# Patient Record
Sex: MALE | Race: ASIAN | NOT HISPANIC OR LATINO | URBAN - METROPOLITAN AREA
[De-identification: names, ages, dates, MRNs, and addresses within clinical notes are randomized per-mention and may not be internally consistent; named-entity substitution may affect disease eponyms.]

---

## 2018-11-08 VITALS — WEIGHT: 229.94 LBS | HEIGHT: 74 IN

## 2018-11-08 NOTE — H&P ADULT - ASSESSMENT
52 y/o French speaking M, current smoker, FMHx CAD/MI and Sudden Cardiac Death (Brother in 50s) w/ PMHX HTN, Hyperlipidemia, DM type 2 who in light of pt's risk factors, progressive CCS Angina Class 4 Sx, is now referred for cardiac catheterization w/ possible intervention.   Note: pt reports feeling lethargic after taking his current BP medications, however reports compliance w/ them. Additionally, pt states he took his Metformin this AM.    ASA: II   Mallampati: III    Precath/consented  Dr. Powell made aware that pt took his Metformin and as per Dr. Powell OK to proceed with cath.  IVF NS @ 100cc/hr  Loaded with ASA 325mg PO x 1 and Plavix 600mg PO x 1.  Risks & benefits of procedure and alternative therapy have been explained to the patient including but not limited to: allergic reaction, bleeding w/possible need for blood transfusion, infection, renal and vascular compromise, limb damage, arrhythmia, stroke, vessel dissection/perforation, Myocardial infarction, emergent CABG. Informed consent obtained and in chart. 52 y/o Arabic speaking M, current smoker, FMHx CAD/MI and Sudden Cardiac Death (Brother in 50s) w/ PMHX HTN, Hyperlipidemia, DM type 2 who in light of pt's risk factors, progressive CCS Angina Class 4 Sx, is now referred for cardiac catheterization w/ possible intervention.   Note: pt reports feeling lethargic after taking his current BP medications, however reports compliance w/ them. Additionally, pt states he took his Metformin this AM.    ASA: II   Mallampati: III    Precath/consented  Dr. Powell made aware that pt took his Metformin and as per Dr. Powell OK to proceed with cath.  IVF NS @ 100cc/hr  Loaded with ASA 325mg PO x 1 and Plavix 600mg PO x 1.]  K 3.7 repleted with KCl 40mEq PO x 1  Risks & benefits of procedure and alternative therapy have been explained to the patient including but not limited to: allergic reaction, bleeding w/possible need for blood transfusion, infection, renal and vascular compromise, limb damage, arrhythmia, stroke, vessel dissection/perforation, Myocardial infarction, emergent CABG. Informed consent obtained and in chart.

## 2018-11-08 NOTE — H&P ADULT - NSHPLABSRESULTS_GEN_ALL_CORE
13.9   5.8   )-----------( 188      ( 09 Nov 2018 13:02 )             41.0   PT/INR - ( 09 Nov 2018 13:02 )   PT: 10.6 sec;   INR: 0.94          PTT - ( 09 Nov 2018 13:02 )  PTT:34.9 sec 13.9   5.8   )-----------( 188      ( 09 Nov 2018 13:02 )             41.0   PT/INR - ( 09 Nov 2018 13:02 )   PT: 10.6 sec;   INR: 0.94          PTT - ( 09 Nov 2018 13:02 )  PTT:34.9 sec  11-09    139  |  100  |  13  ----------------------------<  194<H>  3.7   |  26  |  0.73    Ca    9.7      09 Nov 2018 13:01    TPro  7.6  /  Alb  4.6  /  TBili  0.4  /  DBili  x   /  AST  15  /  ALT  31  /  AlkPhos  66  11-09    EKG: SR, 1st degree AV block, nonspecific IVCD, ST depression in III

## 2018-11-08 NOTE — H&P ADULT - FAMILY HISTORY
Sibling  Still living? Unknown  Family history of sudden cardiac death in brother, Age at diagnosis: 51-60

## 2018-11-08 NOTE — H&P ADULT - HISTORY OF PRESENT ILLNESS
SKELETON    52 yo Tamazight speaking male, PMHx HTN, hyperlipidemia, DM type 2 presents to cardiologist endorsing nonradiating left sided chest pain with shortness of breath with exertion, worsening over the last month. Pt also endorses intermittent palpitations. Denies nausea, weakness. Echocardiogram 11/5/18 showed mild concentric LVH with normal global wall motion. SKELETON    54 yo Yoruba speaking male, current smoker, FHx SCD 2/2 MI (brother in 50s) PMHx HTN, hyperlipidemia, DM type 2 presents to cardiologist endorsing nonradiating left sided chest pain with shortness of breath with exertion, worsening over the last month. Pt also endorses intermittent palpitations. Denies nausea, weakness. Echocardiogram 11/5/18 showed mild concentric LVH with normal global wall motion. EF 68%. Mild AR. *Confirm Medications on Arrival    History obtained from pt via [employer]      54 y/o Sami speaking M, current smoker, FMHx CAD/MI and Sudden Cardiac Death (Brother in 50s) w/ PMHX HTN, Hyperlipidemia, DM type 2 who presented to his cardiologist endorsing progressive intermittent "stabbing" L sided C/P radiating to the back at rest (CCS Angina Class 4 Sx) associated with SOB and fatigue worsening over the last month. Pt also endorses intermittent palpitations. Denies dizziness, nausea, syncope, LE edema. Echocardiogram 11/5/18 showed mild concentric LVH with normal global wall motion. EF 68%. Mild AR.  Due to pts risk factors, progressive CCS Angina Class 4 Sx, pt is referred for cardiac catheterization w/ possible intervention.   Note: pt reports feeling lethargic after taking his current BP medications, however reports compliance w/ them. 54 y/o Mohawk speaking M, current smoker, FMHx CAD/MI and Sudden Cardiac Death (Brother in 50s) w/ PMHX HTN, Hyperlipidemia, DM type 2 who presented to his cardiologist endorsing progressive intermittent "stabbing" L sided C/P radiating to the back at rest (CCS Angina Class 4 Sx) associated with SOB and fatigue worsening over the last month. Pt also endorses intermittent palpitations. Denies dizziness, nausea, syncope, LE edema. Echocardiogram 11/5/18 showed mild concentric LVH with normal global wall motion. EF 68%. Mild AR.  Due to pts risk factors, progressive CCS Angina Class 4 Sx, pt is referred for cardiac catheterization w/ possible intervention.   Note: pt reports feeling lethargic after taking his current BP medications, however reports compliance w/ them. Additionally, pt states he took his Metformin this AM.

## 2018-11-09 ENCOUNTER — OUTPATIENT (OUTPATIENT)
Dept: OUTPATIENT SERVICES | Facility: HOSPITAL | Age: 53
LOS: 1 days | Discharge: NURSING, REHAB OR LONG-TERM CARE | End: 2018-11-09
Payer: MEDICAID

## 2018-11-09 LAB
ALBUMIN SERPL ELPH-MCNC: 4.6 G/DL — SIGNIFICANT CHANGE UP (ref 3.3–5)
ALP SERPL-CCNC: 66 U/L — SIGNIFICANT CHANGE UP (ref 40–120)
ALT FLD-CCNC: 31 U/L — SIGNIFICANT CHANGE UP (ref 10–45)
ANION GAP SERPL CALC-SCNC: 13 MMOL/L — SIGNIFICANT CHANGE UP (ref 5–17)
ANION GAP SERPL CALC-SCNC: 14 MMOL/L — SIGNIFICANT CHANGE UP (ref 5–17)
APTT BLD: 34.9 SEC — SIGNIFICANT CHANGE UP (ref 27.5–36.3)
AST SERPL-CCNC: 15 U/L — SIGNIFICANT CHANGE UP (ref 10–40)
BASOPHILS NFR BLD AUTO: 0.3 % — SIGNIFICANT CHANGE UP (ref 0–2)
BILIRUB SERPL-MCNC: 0.4 MG/DL — SIGNIFICANT CHANGE UP (ref 0.2–1.2)
BUN SERPL-MCNC: 11 MG/DL — SIGNIFICANT CHANGE UP (ref 7–23)
BUN SERPL-MCNC: 13 MG/DL — SIGNIFICANT CHANGE UP (ref 7–23)
CALCIUM SERPL-MCNC: 9.2 MG/DL — SIGNIFICANT CHANGE UP (ref 8.4–10.5)
CALCIUM SERPL-MCNC: 9.7 MG/DL — SIGNIFICANT CHANGE UP (ref 8.4–10.5)
CHLORIDE SERPL-SCNC: 100 MMOL/L — SIGNIFICANT CHANGE UP (ref 96–108)
CHLORIDE SERPL-SCNC: 102 MMOL/L — SIGNIFICANT CHANGE UP (ref 96–108)
CHOLEST SERPL-MCNC: 164 MG/DL — SIGNIFICANT CHANGE UP (ref 10–199)
CK MB CFR SERPL CALC: 1.6 NG/ML — SIGNIFICANT CHANGE UP (ref 0–6.7)
CK SERPL-CCNC: 89 U/L — SIGNIFICANT CHANGE UP (ref 30–200)
CO2 SERPL-SCNC: 24 MMOL/L — SIGNIFICANT CHANGE UP (ref 22–31)
CO2 SERPL-SCNC: 26 MMOL/L — SIGNIFICANT CHANGE UP (ref 22–31)
CREAT SERPL-MCNC: 0.72 MG/DL — SIGNIFICANT CHANGE UP (ref 0.5–1.3)
CREAT SERPL-MCNC: 0.73 MG/DL — SIGNIFICANT CHANGE UP (ref 0.5–1.3)
EOSINOPHIL NFR BLD AUTO: 3.8 % — SIGNIFICANT CHANGE UP (ref 0–6)
GLUCOSE SERPL-MCNC: 105 MG/DL — HIGH (ref 70–99)
GLUCOSE SERPL-MCNC: 194 MG/DL — HIGH (ref 70–99)
HCT VFR BLD CALC: 37.7 % — LOW (ref 39–50)
HCT VFR BLD CALC: 41 % — SIGNIFICANT CHANGE UP (ref 39–50)
HDLC SERPL-MCNC: 59 MG/DL — SIGNIFICANT CHANGE UP
HGB BLD-MCNC: 12.9 G/DL — LOW (ref 13–17)
HGB BLD-MCNC: 13.9 G/DL — SIGNIFICANT CHANGE UP (ref 13–17)
INR BLD: 0.94 — SIGNIFICANT CHANGE UP (ref 0.88–1.16)
LIPID PNL WITH DIRECT LDL SERPL: 81 MG/DL — SIGNIFICANT CHANGE UP
LYMPHOCYTES # BLD AUTO: 32.2 % — SIGNIFICANT CHANGE UP (ref 13–44)
MAGNESIUM SERPL-MCNC: 2.1 MG/DL — SIGNIFICANT CHANGE UP (ref 1.6–2.6)
MCHC RBC-ENTMCNC: 31 PG — SIGNIFICANT CHANGE UP (ref 27–34)
MCHC RBC-ENTMCNC: 31.2 PG — SIGNIFICANT CHANGE UP (ref 27–34)
MCHC RBC-ENTMCNC: 33.9 G/DL — SIGNIFICANT CHANGE UP (ref 32–36)
MCHC RBC-ENTMCNC: 34.2 G/DL — SIGNIFICANT CHANGE UP (ref 32–36)
MCV RBC AUTO: 91.1 FL — SIGNIFICANT CHANGE UP (ref 80–100)
MCV RBC AUTO: 91.5 FL — SIGNIFICANT CHANGE UP (ref 80–100)
MONOCYTES NFR BLD AUTO: 5.9 % — SIGNIFICANT CHANGE UP (ref 2–14)
NEUTROPHILS NFR BLD AUTO: 57.8 % — SIGNIFICANT CHANGE UP (ref 43–77)
PLATELET # BLD AUTO: 170 K/UL — SIGNIFICANT CHANGE UP (ref 150–400)
PLATELET # BLD AUTO: 188 K/UL — SIGNIFICANT CHANGE UP (ref 150–400)
POTASSIUM SERPL-MCNC: 3.7 MMOL/L — SIGNIFICANT CHANGE UP (ref 3.5–5.3)
POTASSIUM SERPL-MCNC: 3.8 MMOL/L — SIGNIFICANT CHANGE UP (ref 3.5–5.3)
POTASSIUM SERPL-SCNC: 3.7 MMOL/L — SIGNIFICANT CHANGE UP (ref 3.5–5.3)
POTASSIUM SERPL-SCNC: 3.8 MMOL/L — SIGNIFICANT CHANGE UP (ref 3.5–5.3)
PROT SERPL-MCNC: 7.6 G/DL — SIGNIFICANT CHANGE UP (ref 6–8.3)
PROTHROM AB SERPL-ACNC: 10.6 SEC — SIGNIFICANT CHANGE UP (ref 10–12.9)
RBC # BLD: 4.14 M/UL — LOW (ref 4.2–5.8)
RBC # BLD: 4.48 M/UL — SIGNIFICANT CHANGE UP (ref 4.2–5.8)
RBC # FLD: 12.4 % — SIGNIFICANT CHANGE UP (ref 10.3–16.9)
RBC # FLD: 12.5 % — SIGNIFICANT CHANGE UP (ref 10.3–16.9)
SODIUM SERPL-SCNC: 139 MMOL/L — SIGNIFICANT CHANGE UP (ref 135–145)
SODIUM SERPL-SCNC: 140 MMOL/L — SIGNIFICANT CHANGE UP (ref 135–145)
TOTAL CHOLESTEROL/HDL RATIO MEASUREMENT: 2.8 RATIO — LOW (ref 3.4–9.6)
TRIGL SERPL-MCNC: 121 MG/DL — SIGNIFICANT CHANGE UP (ref 10–149)
WBC # BLD: 5.4 K/UL — SIGNIFICANT CHANGE UP (ref 3.8–10.5)
WBC # BLD: 5.8 K/UL — SIGNIFICANT CHANGE UP (ref 3.8–10.5)
WBC # FLD AUTO: 5.4 K/UL — SIGNIFICANT CHANGE UP (ref 3.8–10.5)
WBC # FLD AUTO: 5.8 K/UL — SIGNIFICANT CHANGE UP (ref 3.8–10.5)

## 2018-11-09 PROCEDURE — 92920 PRQ TRLUML C ANGIOP 1ART&/BR: CPT | Mod: LD

## 2018-11-09 PROCEDURE — 93458 L HRT ARTERY/VENTRICLE ANGIO: CPT | Mod: 26,XU

## 2018-11-09 PROCEDURE — C1769: CPT

## 2018-11-09 PROCEDURE — 80048 BASIC METABOLIC PNL TOTAL CA: CPT

## 2018-11-09 PROCEDURE — 99235 HOSP IP/OBS SAME DATE MOD 70: CPT

## 2018-11-09 PROCEDURE — 82962 GLUCOSE BLOOD TEST: CPT

## 2018-11-09 PROCEDURE — 85025 COMPLETE CBC W/AUTO DIFF WBC: CPT

## 2018-11-09 PROCEDURE — C1887: CPT

## 2018-11-09 PROCEDURE — 85730 THROMBOPLASTIN TIME PARTIAL: CPT

## 2018-11-09 PROCEDURE — 93458 L HRT ARTERY/VENTRICLE ANGIO: CPT

## 2018-11-09 PROCEDURE — 85027 COMPLETE CBC AUTOMATED: CPT

## 2018-11-09 PROCEDURE — 93571 IV DOP VEL&/PRESS C FLO 1ST: CPT | Mod: LD

## 2018-11-09 PROCEDURE — 85610 PROTHROMBIN TIME: CPT

## 2018-11-09 PROCEDURE — 80053 COMPREHEN METABOLIC PANEL: CPT

## 2018-11-09 PROCEDURE — C1894: CPT

## 2018-11-09 PROCEDURE — C1889: CPT

## 2018-11-09 PROCEDURE — 82553 CREATINE MB FRACTION: CPT

## 2018-11-09 PROCEDURE — C1725: CPT

## 2018-11-09 PROCEDURE — 83735 ASSAY OF MAGNESIUM: CPT

## 2018-11-09 PROCEDURE — 80061 LIPID PANEL: CPT

## 2018-11-09 PROCEDURE — 82550 ASSAY OF CK (CPK): CPT

## 2018-11-09 PROCEDURE — 93571 IV DOP VEL&/PRESS C FLO 1ST: CPT | Mod: 26

## 2018-11-09 RX ORDER — CLOPIDOGREL BISULFATE 75 MG/1
600 TABLET, FILM COATED ORAL ONCE
Qty: 0 | Refills: 0 | Status: COMPLETED | OUTPATIENT
Start: 2018-11-09 | End: 2018-11-09

## 2018-11-09 RX ORDER — ASPIRIN/CALCIUM CARB/MAGNESIUM 324 MG
325 TABLET ORAL ONCE
Qty: 0 | Refills: 0 | Status: COMPLETED | OUTPATIENT
Start: 2018-11-09 | End: 2018-11-09

## 2018-11-09 RX ORDER — CHLORHEXIDINE GLUCONATE 213 G/1000ML
1 SOLUTION TOPICAL ONCE
Qty: 0 | Refills: 0 | Status: DISCONTINUED | OUTPATIENT
Start: 2018-11-09 | End: 2018-11-09

## 2018-11-09 RX ORDER — POTASSIUM CHLORIDE 20 MEQ
40 PACKET (EA) ORAL ONCE
Qty: 0 | Refills: 0 | Status: COMPLETED | OUTPATIENT
Start: 2018-11-09 | End: 2018-11-09

## 2018-11-09 RX ORDER — SODIUM CHLORIDE 9 MG/ML
500 INJECTION INTRAMUSCULAR; INTRAVENOUS; SUBCUTANEOUS
Qty: 0 | Refills: 0 | Status: DISCONTINUED | OUTPATIENT
Start: 2018-11-09 | End: 2018-11-09

## 2018-11-09 RX ORDER — CLOPIDOGREL BISULFATE 75 MG/1
1 TABLET, FILM COATED ORAL
Qty: 30 | Refills: 11
Start: 2018-11-09 | End: 2019-11-03

## 2018-11-09 RX ORDER — ASPIRIN/CALCIUM CARB/MAGNESIUM 324 MG
1 TABLET ORAL
Qty: 30 | Refills: 11
Start: 2018-11-09 | End: 2019-11-03

## 2018-11-09 RX ADMIN — Medication 325 MILLIGRAM(S): at 13:30

## 2018-11-09 RX ADMIN — CLOPIDOGREL BISULFATE 600 MILLIGRAM(S): 75 TABLET, FILM COATED ORAL at 13:30

## 2018-11-09 RX ADMIN — Medication 40 MILLIEQUIVALENT(S): at 16:07

## 2018-11-09 RX ADMIN — SODIUM CHLORIDE 100 MILLILITER(S): 9 INJECTION INTRAMUSCULAR; INTRAVENOUS; SUBCUTANEOUS at 13:30

## 2018-11-09 RX ADMIN — SODIUM CHLORIDE 75 MILLILITER(S): 9 INJECTION INTRAMUSCULAR; INTRAVENOUS; SUBCUTANEOUS at 16:01

## 2018-11-09 NOTE — PROGRESS NOTE ADULT - SUBJECTIVE AND OBJECTIVE BOX
Interventional Cardiology PA SDA Discharge Note    Patient without complaints. Ambulated and voided without difficulties    Afebrile, VSS    Ext:    		  		Right    Radial :  no  hematoma,  no   bleeding, dressing; C/D/I      Pulses:    intact RAD to baseline     A/P:      54 y/o Croatian speaking M, current smoker, FMHx CAD/MI and Sudden Cardiac Death (Brother in 50s) w/ PMHX HTN, Hyperlipidemia, DM type 2 who presented to his cardiologist c/o progressive CCS Angina Class 4 Sx, pt is referred for cardiac catheterization w/ possible intervention.  Patient s/p cardiac catheterization receiving PTCA to D1 (90%), with residual mLAD 50% (FFR 0.83), EF 60%, EDP normal.  Patient deemed stable for discharge home same day post intervention as per Dr. Powell.  He will follow up with Dr. Lema.  New prescription for ASA/Plavix has been E prescribed to pharmacy of choice.       1.	Stable for discharge as per attending  __Sherry_______ after bed rest, pt voids, groin/wrist stable and 30 minutes of ambulation.  2.	Follow-up with PMD/Cardiologist ____Torey_______ in 1-2 weeks  3.	Discharged forms signed and copies in chart
SDA DISCHARGE INSTRUCTIONS    You underwent a coronary/Peripheral angiogram and should wait 3 days before returning to ordinary activities. Do not drive for 2 days. Consult your doctor before returning to vigorous activity. You may return to work in 3-5 days. The catheter from your wrist/groin was removed and you should remove the dressing in 24 hours. You may shower once the dressing is removed, but avoid baths, hot tubs, or swimming for 5 days to prevent infection. If you notice bleeding from the site, hardening and pain at the site, drainage or redness from the site, coolness/paleness of the extremity, swelling, or fever, please call [phone]. Please continue your aspirin and plavix/Brilinta/Effient as prescribed unless otherwise indicated by your cardiologist. All of your prescriptions have been sent to the pharmacy. Please follow up with __ in 1-2 weeks. All of your needed prescriptions have been sent electronically to your pharmacy.                                                                                          FLORENCIA signiture                                          Date/Time                                                                                                                                                                         __________________________________________                                                                                         Nurse's Signiture                                         Date/Time                                                                                                                                                                         _________________________________________                                                                                                                                                                                                                                                     Patient/Significant Other Signature         Date/Time                                                                                        __________________________________________

## 2021-03-07 ENCOUNTER — TRANSCRIPTION ENCOUNTER (OUTPATIENT)
Age: 56
End: 2021-03-07

## 2021-03-07 ENCOUNTER — INPATIENT (INPATIENT)
Facility: HOSPITAL | Age: 56
LOS: 5 days | Discharge: HOME | DRG: 66 | End: 2021-03-13
Attending: PSYCHIATRY & NEUROLOGY | Admitting: INTERNAL MEDICINE
Payer: MEDICAID

## 2021-03-07 VITALS
RESPIRATION RATE: 17 BRPM | OXYGEN SATURATION: 99 % | HEART RATE: 104 BPM | SYSTOLIC BLOOD PRESSURE: 223 MMHG | TEMPERATURE: 99 F | DIASTOLIC BLOOD PRESSURE: 133 MMHG

## 2021-03-07 DIAGNOSIS — I61.9 NONTRAUMATIC INTRACEREBRAL HEMORRHAGE, UNSPECIFIED: ICD-10-CM

## 2021-03-07 LAB
ALBUMIN SERPL ELPH-MCNC: 4.5 G/DL — SIGNIFICANT CHANGE UP (ref 3.3–5)
ALP SERPL-CCNC: 99 U/L — SIGNIFICANT CHANGE UP (ref 40–120)
ALT FLD-CCNC: 26 U/L — SIGNIFICANT CHANGE UP (ref 10–45)
ANION GAP SERPL CALC-SCNC: 14 MMOL/L — SIGNIFICANT CHANGE UP (ref 5–17)
ANION GAP SERPL CALC-SCNC: 16 MMOL/L — SIGNIFICANT CHANGE UP (ref 5–17)
APTT BLD: 32.2 SEC — SIGNIFICANT CHANGE UP (ref 27.5–35.5)
AST SERPL-CCNC: 18 U/L — SIGNIFICANT CHANGE UP (ref 10–40)
BASE EXCESS BLDV CALC-SCNC: 0.9 MMOL/L — SIGNIFICANT CHANGE UP (ref -2–2)
BASOPHILS # BLD AUTO: 0.04 K/UL — SIGNIFICANT CHANGE UP (ref 0–0.2)
BASOPHILS NFR BLD AUTO: 0.5 % — SIGNIFICANT CHANGE UP (ref 0–2)
BILIRUB SERPL-MCNC: 0.5 MG/DL — SIGNIFICANT CHANGE UP (ref 0.2–1.2)
BLD GP AB SCN SERPL QL: NEGATIVE — SIGNIFICANT CHANGE UP
BUN SERPL-MCNC: 13 MG/DL — SIGNIFICANT CHANGE UP (ref 7–23)
BUN SERPL-MCNC: 15 MG/DL — SIGNIFICANT CHANGE UP (ref 7–23)
CALCIUM SERPL-MCNC: 9.5 MG/DL — SIGNIFICANT CHANGE UP (ref 8.4–10.5)
CALCIUM SERPL-MCNC: 9.5 MG/DL — SIGNIFICANT CHANGE UP (ref 8.4–10.5)
CHLORIDE SERPL-SCNC: 101 MMOL/L — SIGNIFICANT CHANGE UP (ref 96–108)
CHLORIDE SERPL-SCNC: 98 MMOL/L — SIGNIFICANT CHANGE UP (ref 96–108)
CK MB CFR SERPL CALC: 1.2 NG/ML — SIGNIFICANT CHANGE UP (ref 0–6.7)
CK SERPL-CCNC: 78 U/L — SIGNIFICANT CHANGE UP (ref 30–200)
CO2 BLDV-SCNC: 26 MMOL/L — SIGNIFICANT CHANGE UP (ref 22–30)
CO2 SERPL-SCNC: 21 MMOL/L — LOW (ref 22–31)
CO2 SERPL-SCNC: 23 MMOL/L — SIGNIFICANT CHANGE UP (ref 22–31)
CREAT SERPL-MCNC: 0.62 MG/DL — SIGNIFICANT CHANGE UP (ref 0.5–1.3)
CREAT SERPL-MCNC: 0.79 MG/DL — SIGNIFICANT CHANGE UP (ref 0.5–1.3)
EOSINOPHIL # BLD AUTO: 0.03 K/UL — SIGNIFICANT CHANGE UP (ref 0–0.5)
EOSINOPHIL NFR BLD AUTO: 0.4 % — SIGNIFICANT CHANGE UP (ref 0–6)
GAS PNL BLDV: SIGNIFICANT CHANGE UP
GLUCOSE BLDC GLUCOMTR-MCNC: 189 MG/DL — HIGH (ref 70–99)
GLUCOSE BLDC GLUCOMTR-MCNC: 230 MG/DL — HIGH (ref 70–99)
GLUCOSE SERPL-MCNC: 249 MG/DL — HIGH (ref 70–99)
GLUCOSE SERPL-MCNC: 334 MG/DL — HIGH (ref 70–99)
HCO3 BLDV-SCNC: 25 MMOL/L — SIGNIFICANT CHANGE UP (ref 21–29)
HCT VFR BLD CALC: 45.9 % — SIGNIFICANT CHANGE UP (ref 39–50)
HGB BLD-MCNC: 15.6 G/DL — SIGNIFICANT CHANGE UP (ref 13–17)
IMM GRANULOCYTES NFR BLD AUTO: 0.4 % — SIGNIFICANT CHANGE UP (ref 0–1.5)
INR BLD: 0.97 RATIO — SIGNIFICANT CHANGE UP (ref 0.88–1.16)
LYMPHOCYTES # BLD AUTO: 1.44 K/UL — SIGNIFICANT CHANGE UP (ref 1–3.3)
LYMPHOCYTES # BLD AUTO: 19 % — SIGNIFICANT CHANGE UP (ref 13–44)
MAGNESIUM SERPL-MCNC: 1.9 MG/DL — SIGNIFICANT CHANGE UP (ref 1.6–2.6)
MCHC RBC-ENTMCNC: 30.4 PG — SIGNIFICANT CHANGE UP (ref 27–34)
MCHC RBC-ENTMCNC: 34 GM/DL — SIGNIFICANT CHANGE UP (ref 32–36)
MCV RBC AUTO: 89.3 FL — SIGNIFICANT CHANGE UP (ref 80–100)
MONOCYTES # BLD AUTO: 0.43 K/UL — SIGNIFICANT CHANGE UP (ref 0–0.9)
MONOCYTES NFR BLD AUTO: 5.7 % — SIGNIFICANT CHANGE UP (ref 2–14)
NEUTROPHILS # BLD AUTO: 5.59 K/UL — SIGNIFICANT CHANGE UP (ref 1.8–7.4)
NEUTROPHILS NFR BLD AUTO: 74 % — SIGNIFICANT CHANGE UP (ref 43–77)
NRBC # BLD: 0 /100 WBCS — SIGNIFICANT CHANGE UP (ref 0–0)
PCO2 BLDV: 38 MMHG — SIGNIFICANT CHANGE UP (ref 35–50)
PH BLDV: 7.42 — SIGNIFICANT CHANGE UP (ref 7.35–7.45)
PHOSPHATE SERPL-MCNC: 2.9 MG/DL — SIGNIFICANT CHANGE UP (ref 2.5–4.5)
PLATELET # BLD AUTO: 211 K/UL — SIGNIFICANT CHANGE UP (ref 150–400)
PO2 BLDV: 81 MMHG — HIGH (ref 25–45)
POTASSIUM SERPL-MCNC: 3.2 MMOL/L — LOW (ref 3.5–5.3)
POTASSIUM SERPL-MCNC: 4.1 MMOL/L — SIGNIFICANT CHANGE UP (ref 3.5–5.3)
POTASSIUM SERPL-SCNC: 3.2 MMOL/L — LOW (ref 3.5–5.3)
POTASSIUM SERPL-SCNC: 4.1 MMOL/L — SIGNIFICANT CHANGE UP (ref 3.5–5.3)
PROT SERPL-MCNC: 7.7 G/DL — SIGNIFICANT CHANGE UP (ref 6–8.3)
PROTHROM AB SERPL-ACNC: 11.6 SEC — SIGNIFICANT CHANGE UP (ref 10.6–13.6)
RBC # BLD: 5.14 M/UL — SIGNIFICANT CHANGE UP (ref 4.2–5.8)
RBC # FLD: 12.5 % — SIGNIFICANT CHANGE UP (ref 10.3–14.5)
RH IG SCN BLD-IMP: POSITIVE — SIGNIFICANT CHANGE UP
RH IG SCN BLD-IMP: POSITIVE — SIGNIFICANT CHANGE UP
SAO2 % BLDV: 94 % — HIGH (ref 67–88)
SARS-COV-2 RNA SPEC QL NAA+PROBE: SIGNIFICANT CHANGE UP
SODIUM SERPL-SCNC: 135 MMOL/L — SIGNIFICANT CHANGE UP (ref 135–145)
SODIUM SERPL-SCNC: 138 MMOL/L — SIGNIFICANT CHANGE UP (ref 135–145)
TROPONIN T, HIGH SENSITIVITY RESULT: 7 NG/L — SIGNIFICANT CHANGE UP (ref 0–51)
TROPONIN T, HIGH SENSITIVITY RESULT: <6 NG/L — SIGNIFICANT CHANGE UP (ref 0–51)
WBC # BLD: 7.56 K/UL — SIGNIFICANT CHANGE UP (ref 3.8–10.5)
WBC # FLD AUTO: 7.56 K/UL — SIGNIFICANT CHANGE UP (ref 3.8–10.5)

## 2021-03-07 PROCEDURE — 36620 INSERTION CATHETER ARTERY: CPT

## 2021-03-07 PROCEDURE — 99291 CRITICAL CARE FIRST HOUR: CPT

## 2021-03-07 PROCEDURE — 99291 CRITICAL CARE FIRST HOUR: CPT | Mod: 25

## 2021-03-07 PROCEDURE — 70450 CT HEAD/BRAIN W/O DYE: CPT | Mod: 26,59,MA

## 2021-03-07 PROCEDURE — 70496 CT ANGIOGRAPHY HEAD: CPT | Mod: 26,MA

## 2021-03-07 PROCEDURE — 70450 CT HEAD/BRAIN W/O DYE: CPT | Mod: 26,77,59

## 2021-03-07 PROCEDURE — 93010 ELECTROCARDIOGRAM REPORT: CPT

## 2021-03-07 PROCEDURE — 71045 X-RAY EXAM CHEST 1 VIEW: CPT | Mod: 26

## 2021-03-07 PROCEDURE — 70498 CT ANGIOGRAPHY NECK: CPT | Mod: 26,MA

## 2021-03-07 RX ORDER — INSULIN LISPRO 100/ML
VIAL (ML) SUBCUTANEOUS
Refills: 0 | Status: DISCONTINUED | OUTPATIENT
Start: 2021-03-07 | End: 2021-03-07

## 2021-03-07 RX ORDER — MAGNESIUM SULFATE 500 MG/ML
2 VIAL (ML) INJECTION ONCE
Refills: 0 | Status: COMPLETED | OUTPATIENT
Start: 2021-03-07 | End: 2021-03-07

## 2021-03-07 RX ORDER — ACETAMINOPHEN 500 MG
650 TABLET ORAL EVERY 6 HOURS
Refills: 0 | Status: DISCONTINUED | OUTPATIENT
Start: 2021-03-07 | End: 2021-03-13

## 2021-03-07 RX ORDER — INSULIN LISPRO 100/ML
VIAL (ML) SUBCUTANEOUS EVERY 6 HOURS
Refills: 0 | Status: DISCONTINUED | OUTPATIENT
Start: 2021-03-07 | End: 2021-03-08

## 2021-03-07 RX ORDER — SENNA PLUS 8.6 MG/1
2 TABLET ORAL AT BEDTIME
Refills: 0 | Status: DISCONTINUED | OUTPATIENT
Start: 2021-03-07 | End: 2021-03-13

## 2021-03-07 RX ORDER — CHLORHEXIDINE GLUCONATE 213 G/1000ML
1 SOLUTION TOPICAL
Refills: 0 | Status: DISCONTINUED | OUTPATIENT
Start: 2021-03-07 | End: 2021-03-09

## 2021-03-07 RX ORDER — POLYETHYLENE GLYCOL 3350 17 G/17G
17 POWDER, FOR SOLUTION ORAL
Refills: 0 | Status: DISCONTINUED | OUTPATIENT
Start: 2021-03-07 | End: 2021-03-13

## 2021-03-07 RX ORDER — NICARDIPINE HYDROCHLORIDE 30 MG/1
5 CAPSULE, EXTENDED RELEASE ORAL
Qty: 40 | Refills: 0 | Status: DISCONTINUED | OUTPATIENT
Start: 2021-03-07 | End: 2021-03-07

## 2021-03-07 RX ORDER — NICARDIPINE HYDROCHLORIDE 30 MG/1
5 CAPSULE, EXTENDED RELEASE ORAL
Qty: 40 | Refills: 0 | Status: DISCONTINUED | OUTPATIENT
Start: 2021-03-07 | End: 2021-03-10

## 2021-03-07 RX ORDER — POTASSIUM CHLORIDE 20 MEQ
40 PACKET (EA) ORAL EVERY 4 HOURS
Refills: 0 | Status: COMPLETED | OUTPATIENT
Start: 2021-03-07 | End: 2021-03-08

## 2021-03-07 RX ORDER — INFLUENZA VIRUS VACCINE 15; 15; 15; 15 UG/.5ML; UG/.5ML; UG/.5ML; UG/.5ML
0.5 SUSPENSION INTRAMUSCULAR ONCE
Refills: 0 | Status: DISCONTINUED | OUTPATIENT
Start: 2021-03-07 | End: 2021-03-12

## 2021-03-07 RX ORDER — SODIUM CHLORIDE 9 MG/ML
1000 INJECTION INTRAMUSCULAR; INTRAVENOUS; SUBCUTANEOUS
Refills: 0 | Status: DISCONTINUED | OUTPATIENT
Start: 2021-03-07 | End: 2021-03-08

## 2021-03-07 RX ADMIN — Medication 2: at 17:00

## 2021-03-07 RX ADMIN — Medication 50 GRAM(S): at 22:55

## 2021-03-07 RX ADMIN — CHLORHEXIDINE GLUCONATE 1 APPLICATION(S): 213 SOLUTION TOPICAL at 21:23

## 2021-03-07 RX ADMIN — Medication 40 MILLIEQUIVALENT(S): at 22:55

## 2021-03-07 RX ADMIN — NICARDIPINE HYDROCHLORIDE 25 MG/HR: 30 CAPSULE, EXTENDED RELEASE ORAL at 17:00

## 2021-03-07 RX ADMIN — Medication 4: at 23:13

## 2021-03-07 RX ADMIN — NICARDIPINE HYDROCHLORIDE 25 MG/HR: 30 CAPSULE, EXTENDED RELEASE ORAL at 19:00

## 2021-03-07 RX ADMIN — NICARDIPINE HYDROCHLORIDE 25 MG/HR: 30 CAPSULE, EXTENDED RELEASE ORAL at 12:30

## 2021-03-07 NOTE — ED ADULT NURSE NOTE - OBJECTIVE STATEMENT
57 yo presents to the ED from home. A&OX4, ambulatory c/o off balance sensation starting yesterday at 2100. reports BLAND yesterday, denies pain currently. active smoker. history of HTN, took himself off meds for 5 months due to bad side effects. reports sudden onset pain yesterday with moderate dizziness. no numbness tingling. 20G Rhand and 20G LAC. plan of care discussed. safety and comfort measures maintained.

## 2021-03-07 NOTE — DISCHARGE NOTE NURSING/CASE MANAGEMENT/SOCIAL WORK - NSDCVIVACCINE_GEN_ALL_CORE_FT
No Vaccines Administered. Severe acute respiratory syndrome coronavirus 2 (SARS-CoV-2) (Coronavirus disease [COVID-19]) vaccine , 2021/3/12 14:27 , Job Lynne (RYLIE)

## 2021-03-07 NOTE — H&P ADULT - HISTORY OF PRESENT ILLNESS
56M p/w headache, imbalance, since yesterday,CT head w/ R thalamic hemorrhage with IVH, ICH score 2. Exam: AAOx3, eomi, no facial, PERRL, no drift, WILSON 5/5.

## 2021-03-07 NOTE — PROGRESS NOTE ADULT - ASSESSMENT
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, likely 2/2 HTN    NEURO:  Monitor for symptomatic hydrocephalus and need for cerebrospinal fluid diversion via external ventricular drain given IVH  Conventional angiogram to r/o vascular malformation as etiology of hemorrhage  Stroke neuro consult  Stroke core measures  Activity: [] mobilize as tolerated [x] Bedrest for tonight [] PT [] OT [] PMNR    PULM:  Encourage incentive spirometry     CV:  SBP goal 100-160, nicardipine gtt prn  TTE      RENAL:  Fluids: IVF    GI:  Diet: Dysphagia screen and advance diet  NPO after midnight for angio  Bowel regimen     ENDO:   Goal euglycemia (-180)  Insulin gtt if FS>200x2    HEME/ONC:  VTE prophylaxis: [x] SCDs [] chemoprophylaxis [x] hold chemoprophylaxis due to: ICH with IVH [] high risk of DVT/PE on admission due to:    ID: monitor for fevers    SOCIAL/FAMILY:  [x] awaiting [] updated at bedside [] family meeting    CODE STATUS:  [x] Full Code [] DNR [] DNI [] Palliative/Comfort Care    DISPOSITION:  [x] ICU [] Stroke Unit [] Floor [] EMU [] RCU [] PCU    [x] Patient is at high risk of neurologic deterioration/death due to: intracranial hemorrhage expansion, intraventricular hemorrhage, hydrocephalus, brain compression

## 2021-03-07 NOTE — PROGRESS NOTE ADULT - SUBJECTIVE AND OBJECTIVE BOX
SUMMARY: 57yo man presenting to ED with 1day history of headache and imbalance. CTH showing R thalamic hemorrhage with intraventricular extension. PMH HTN, hyperglycemic on admission, but does not take any medications at home.     OVERNIGHT EVENTS: Adm NSCU    ADMISSION SCORES:   GCS: 15 HH: MF: NIHSS: ICH Score: 1    REVIEW OF SYSTEMS: [] Unable to Assess due to neurologic exam   [ x] All ROS addressed below are non-contributory, except:  Neuro: [ x] Headache [ ] Back pain [ ] Numbness [ ] Weakness [ ] Ataxia [ ] Dizziness [ ] Aphasia [ ] Dysarthria [ ] Visual disturbance  Resp: [ ] Shortness of breath/dyspnea [ ] Orthopnea [ ] Cough  CV: [ ] Chest pain [ ] Palpitation [ ] Lightheadedness [ ] Syncope  Renal: [ ] Thirst [ ] Edema  GI: [ ] Nausea [ ] Emesis [ ] Abdominal pain [ ] Constipation [ ] Diarrhea  Hem: [ ] Hematemesis [ ] bBright red blood per rectum  ID: [ ] Fever [ ] Chills [ ] Dysuria  ENT: [ ] Rhinorrhea    VITALS: [x] Reviewed    IMAGING/DATA: [x] Reviewed    IVF FLUIDS/MEDICATIONS: [x] Reviewed    ALLERGIES: Allergies    No Known Allergies    Intolerances        DEVICES:   [] Restraints [x] PIVs [] ET tube [] central line [] PICC [] arterial line [] healy [] NGT/OGT [] EVD [] LD [] JAYNE/HMV [] LiCOX [] ICP monitor [] Trach [] PEG [] Chest Tube [] other:    EXAMINATION:  General: No acute distress  HEENT: Anicteric sclerae  Cardiac: [id number]  Lungs: Clear  Abdomen: Soft, non-tender, +BS  Extremities: No c/c/e  Skin/Incision Site: Clean, dry and intact  Neurologic: Awake, alert, fully oriented, follows commands, PERRL, EOMI, face symmetric, tongue midline, no drift, full strength

## 2021-03-07 NOTE — ED PROVIDER NOTE - OBJECTIVE STATEMENT
CC HA  PMH Can't recall  56y male pmh Tobacco 1/2ppd, Hypertension, Noncompliant x5m, No dm,HLD,cva, covid, PT comes to ed complains of ha, onset last night pt had bad ha that knocked him to ground. "Not like a normal ha" sudden onset, now pain much but has mod dizziness. Had diff w balance.

## 2021-03-07 NOTE — PROCEDURE NOTE - NSANESTHESIA_GEN_A_CORE
Your test results will be communicated to you via: My [facility], Telephone or Letter.  If you have not received your test results within one week. Please contact the clinic.      
2% lidocaine

## 2021-03-07 NOTE — H&P ADULT - DOES THIS PATIENT HAVE A HISTORY OF OR HAS BEEN DX WITH HEART FAILURE?
Chart reviewed. Last colonoscopy on 3/3/2020 with Dr. Almaguer. Recommend repeat  6mo.     Please call pt to schedule Colonoscopy with Dr. Almaguer.      Schedule Procedure:   Please Schedule Routine (next available or patient preference)  Procedure: Colonoscopy (14614) with MD preference for bowel prep.  Diagnosis: Colon Adenomas D12.6  Is patient:    Diabetic? No   ANTIPLATELET / ANTICOAGULATION: MEDICATION:  None  Latex allergy: No  Sleep apnea: No  Location: Patient Preference  Special Instructions:   IV Anesthesia     no

## 2021-03-07 NOTE — ED PROVIDER NOTE - CHPI ED SYMPTOMS NEG
no blurred vision/no confusion/no fever/no loss of consciousness/no nausea/no vomiting/no weakness/no change in level of consciousness

## 2021-03-07 NOTE — PROGRESS NOTE ADULT - SUBJECTIVE AND OBJECTIVE BOX
SUMMARY:  56 year-old man with history of hypertension (on no home emdications) presented to ED 3/7/21 with 1 day history of headache and imbalance. CT Head revealed right thalamic hemorrhage with intraventricular extension.     ADMISSION SCORES:   GCS: 15 HH: MF: NIHSS: ICH Score: 1    HOSPITAL COURSE:  3/7: Admitted to NSCU.    24 HOUR EVENTS:  Admitted to NSCU. Planned for angio in AM.     REVIEW OF SYSTEMS: [] Unable to Assess due to neurologic exam   [x] All ROS addressed below are non-contributory, except:  Neuro: [x] Headache [ ] Back pain [ ] Numbness [ ] Weakness [ ] Ataxia [ ] Dizziness [ ] Aphasia [ ] Dysarthria [ ] Visual disturbance  Resp: [ ] Shortness of breath/dyspnea [ ] Orthopnea [ ] Cough  CV: [ ] Chest pain [ ] Palpitation [ ] Lightheadedness [ ] Syncope  Renal: [ ] Thirst [ ] Edema  GI: [ ] Nausea [ ] Emesis [ ] Abdominal pain [ ] Constipation [ ] Diarrhea  Hem: [ ] Hematemesis [ ] Bright red blood per rectum  ID: [ ] Fever [ ] Chills [ ] Dysuria  ENT: [ ] Rhinorrhea    VITALS/DATA/ORDERS: [x] Reviewed    EXAMINATION:  General: Cooperative  HEENT: Anicteric sclerae  Cardiac: [id number]  Lungs: Clear  Abdomen: Soft, non-tender, +BS  Extremities: No c/c/e  Skin/Incision Site: Clean, dry and intact  Neurologic: Awake, alert, fully oriented, follows commands, PERRL, EOMI, face symmetric, tongue midline, no drift, full strength (reports of trace left sided weakness but I did not appreciate this) SUMMARY:  56 year-old man with history of hypertension (on no home emdications) presented to ED 3/7/21 with 1 day history of headache and imbalance. CT Head revealed right thalamic hemorrhage with intraventricular extension.     ADMISSION SCORES:   GCS: 15 HH: MF: NIHSS: ICH Score: 1    HOSPITAL COURSE:  3/7: Admitted to NSCU.    24 HOUR EVENTS:  Admitted to NSCU. Planned for angio in AM.     REVIEW OF SYSTEMS: [] Unable to Assess due to neurologic exam   [x] All ROS addressed below are non-contributory, except:  Neuro: [x] Headache [ ] Back pain [ ] Numbness [ ] Weakness [ ] Ataxia [ ] Dizziness [ ] Aphasia [ ] Dysarthria [ ] Visual disturbance  Resp: [ ] Shortness of breath/dyspnea [ ] Orthopnea [ ] Cough  CV: [ ] Chest pain [ ] Palpitation [ ] Lightheadedness [ ] Syncope  Renal: [ ] Thirst [ ] Edema  GI: [ ] Nausea [ ] Emesis [ ] Abdominal pain [ ] Constipation [ ] Diarrhea  Hem: [ ] Hematemesis [ ] Bright red blood per rectum  ID: [ ] Fever [ ] Chills [ ] Dysuria  ENT: [ ] Rhinorrhea    VITALS/DATA/ORDERS: [x] Reviewed    EXAMINATION:  General: Cooperative  HEENT: Anicteric sclerae  Cardiac: [id number]  Lungs: Clear  Abdomen: Soft, non-tender, +BS  Extremities: No c/c/e  Skin/Incision Site: Clean, dry and intact  Neurologic: Awake, alert, fully oriented, follows commands, PERRL, EOMI, face symmetric, tongue midline, full strength  on right, left arm drift, left arm 4+/5, LLE 5/5

## 2021-03-07 NOTE — H&P ADULT - ATTENDING COMMENTS
R thalamic hemorrhage with IVH, no hydrocephalus. CTA negative, but given the IVH will need formal angiogram to rule out underlying vascular malformation.

## 2021-03-07 NOTE — PROGRESS NOTE ADULT - ASSESSMENT
ASSESSMENT/PLAN: R thalamic hemorrhage with intraventricular extension and ventriculomegaly    NEURO:  stability CTH in 6hrs from the first one  monitor for s/s hydrocephalus given IVH  conventional angiogram plan per neurosurgery  stroke neuro consult  stroke core measures  Activity: [] mobilize as tolerated [x] Bedrest [] PT [] OT [] PMNR    PULM:  encourage incentive spirometry as able    CV:  SBP goal 100-160, nicardipine gtt prn  arterial line placement  TTE      RENAL:  Fluids: IVF    GI:  Diet: dysphagia screen and advance diet  NPO after midnight  GI prophylaxis [x] not indicated [] PPI [] other:  Bowel regimen [] colace [x] senna [] other:    ENDO:   Goal euglycemia (-180)  insulin gtt, if FS>200x2    HEME/ONC:  VTE prophylaxis: [x] SCDs [] chemoprophylaxis [x] hold chemoprophylaxis due to: ICH with IVH [] high risk of DVT/PE on admission due to:    ID: monitor for fevers    MISC:    SOCIAL/FAMILY:  [x] awaiting [] updated at bedside [] family meeting    CODE STATUS:  [x] Full Code [] DNR [] DNI [] Palliative/Comfort Care    DISPOSITION:  [x] ICU [] Stroke Unit [] Floor [] EMU [] RCU [] PCU    [x] Patient is at high risk of neurologic deterioration/death due to: intracranial hemorrhage expansion, intraventricular hemorrhage, hydrocephalus, brain comrpession

## 2021-03-07 NOTE — ED PROVIDER NOTE - CLINICAL SUMMARY MEDICAL DECISION MAKING FREE TEXT BOX
Adult male w coronary artery disease,htn,tobacco noncompliant on meds several months pw sudden onset ha last night w persistent diff ambulating. CT cw ich/htn bleed. Plan nsx cs, iv med/bp control. labs ekg tba  Joni Laurent MD, Facep

## 2021-03-07 NOTE — ED ADULT NURSE NOTE - NSIMPLEMENTINTERV_GEN_ALL_ED
Implemented All Fall Risk Interventions:  [place] to call system. Call bell, personal items and telephone within reach. Instruct patient to call for assistance. Room bathroom lighting operational. Non-slip footwear when patient is off stretcher. Physically safe environment: no spills, clutter or unnecessary equipment. Stretcher in lowest position, wheels locked, appropriate side rails in place. Provide visual cue, wrist band, yellow gown, etc. Monitor gait and stability. Monitor for mental status changes and reorient to person, place, and time. Review medications for side effects contributing to fall risk. Reinforce activity limits and safety measures with patient and family.

## 2021-03-07 NOTE — H&P ADULT - ASSESSMENT
Alton Ornelas    56M hx HTN noncompliant with medications p/w headache, imbalance, since yesterday,CT head w/ R thalamic hemorrhage with IVH, ICH score 2. Exam: AAOx3, eomi, no facial, PERRL, no drift, WILSON 5/5.   -Adm NSCU under intensivist  -BP<160  -Repeat CTH 6hrs  -Hydro watch  -CTA grossly neg, f/u final read  -Hemorrhage likely 2/2 uncontrolled HTN, per Pt stopped taking meds because they made him depressed, BP >220 on presentation.   -Can consider angiogram this admission Alton Ornelas    56M hx HTN noncompliant with medications p/w headache, imbalance, since yesterday,CT head w/ R thalamic hemorrhage with IVH, ICH score 2. Exam: AAOx3, eomi, no facial, PERRL, no drift, WILSON 5/5.   -Adm NSCU under intensivist  -BP<160  -Repeat CTH 6hrs  -Hydro watch  -CTA grossly neg, f/u final read  -Hemorrhage likely 2/2 uncontrolled HTN, per Pt stopped taking meds because they made him depressed, BP >220 on presentation.   -Pre-op for angio tmmrw

## 2021-03-07 NOTE — DISCHARGE NOTE NURSING/CASE MANAGEMENT/SOCIAL WORK - PATIENT PORTAL LINK FT
You can access the FollowMyHealth Patient Portal offered by [facility] by registering at the following website: [email or web address]. By joining [facility]’s FollowMyHealth portal, you will also be able to view your health information using other applications (apps) compatible with our system.

## 2021-03-07 NOTE — PROCEDURE NOTE - NSPROCDETAILS_GEN_ALL_CORE
location identified, draped/prepped, sterile technique used, needle inserted/introduced/positive blood return obtained via catheter/connected to a pressurized flush line/sutured in place/hemostasis with direct pressure, dressing applied/all materials/supplies accounted for at end of procedure

## 2021-03-07 NOTE — CONSULT NOTE ADULT - SUBJECTIVE AND OBJECTIVE BOX
HPI: 56M here w/ headache, imbalance, fall from 1400 3/6. As per patient, had posterior distribution headache yesterday, moderate intensity, not thunderclap. Felt dizzy, imbalanced, fell. Went to urgent care, found to have high BP yesterday. Currently feels well. Code stroke called in ED today    (Stroke only)  NIHSS: 0  MRS: 0    ROS: as above    PAST MEDICAL & SURGICAL HISTORY:    FAMILY HISTORY:    SOCIAL HISTORY:   T/E/D:   Occupation:   Lives with:     MEDICATIONS (HOME):  Home Medications:    MEDICATIONS  (STANDING):    MEDICATIONS  (PRN):    ALLERGIES/INTOLERANCES:  Allergies  No Known Allergies    Intolerances    VITALS & EXAMINATION:  Vital Signs Last 24 Hrs  T(C): 37.2 (07 Mar 2021 11:47), Max: 37.2 (07 Mar 2021 11:47)  T(F): 98.9 (07 Mar 2021 11:47), Max: 98.9 (07 Mar 2021 11:47)  HR: 104 (07 Mar 2021 11:47) (104 - 104)  BP: 223/133 (07 Mar 2021 11:47) (223/133 - 223/133)  BP(mean): --  RR: 17 (07 Mar 2021 11:47) (17 - 17)  SpO2: 99% (07 Mar 2021 11:47) (99% - 99%)    General:  Constitutional: Appears stated age, in no apparent distress including pain  Head: Normocephalic & atraumatic.  Extremities: No cyanosis, clubbing, or edema.  Skin: No rashes, bruising, or discoloration.    Neurological (>12):  MS: Awake, alert, oriented to person, place, situation, time. Normal affect. Follows all commands.    Language: Speech is clear, fluent with good repetition & comprehension     CNs: VFF. EOMI no nystagmus, no diplopia. V1-3 intact to LT, well developed masseter muscles b/l. No facial asymmetry b/l, full eye closure strength b/l.  Symmetric palate elevation in midline. Gag reflex deferred. Tongue midline, normal movements, no atrophy.    Motor: Normal muscle bulk & tone. No noticeable tremor or seizure. No pronator drift.  5/5 shoulder abduction, elbow flex/ext, forearm flexors bilaterally  5/5 hip flex, knee flex/ext, dorsi/plantar    Sensation: Intact to LT    Reflexes:  +2 bi/brach/pat/ankle bilaterally  Plantar equivocal    Coordination: intact rapid-alt movements. No dysmetria to FTN/HTS    Gait: Normal    LABORATORY:  CBC   Chem       LFTs   Coagulopathy   Lipid Panel   A1c   Cardiac enzymes     U/A   CSF  Immunological  Other    STUDIES & IMAGING:  Studies (EKG, EEG, EMG, etc):     Radiology (XR, CT, MR, U/S, TTE/MARIE): HPI: 56M here w/ headache, imbalance, fall from 1400 3/6. As per patient, had posterior distribution headache yesterday, moderate intensity. Felt dizzy, imbalanced, fell. Went to urgent care, found to have high BP yesterday. Currently feels well. Code stroke called in ED today.     (Stroke only)  NIHSS: 0  MRS: 0  ICH: 2    ROS: as above    PAST MEDICAL & SURGICAL HISTORY:    FAMILY HISTORY:    SOCIAL HISTORY:   T/E/D:   Occupation:   Lives with:     MEDICATIONS (HOME):  Home Medications:    MEDICATIONS  (STANDING):    MEDICATIONS  (PRN):    ALLERGIES/INTOLERANCES:  Allergies  No Known Allergies    Intolerances    VITALS & EXAMINATION:  Vital Signs Last 24 Hrs  T(C): 37.2 (07 Mar 2021 11:47), Max: 37.2 (07 Mar 2021 11:47)  T(F): 98.9 (07 Mar 2021 11:47), Max: 98.9 (07 Mar 2021 11:47)  HR: 104 (07 Mar 2021 11:47) (104 - 104)  BP: 223/133 (07 Mar 2021 11:47) (223/133 - 223/133)  BP(mean): --  RR: 17 (07 Mar 2021 11:47) (17 - 17)  SpO2: 99% (07 Mar 2021 11:47) (99% - 99%)    PE:  MS: Awake, alert, oriented to person, place, situation, time. Normal affect. Follows all commands.    Language: Speech is clear, fluent with good repetition & comprehension     CNs: EOMI, VFF. well developed masseter muscles b/l. No facial asymmetry b/l. Symmetric palate elevation in midline. Gag reflex deferred. Tongue midline, normal movements, no atrophy.    Motor: Normal muscle bulk & tone. No noticeable tremor or seizure.  Able to lift BUE > 10 secs w/o drift  Able to lift BLE > 5 secs w/o drift    Sensation: Intact to LT    Coordination: Bilateral FTN intact      LABORATORY:  CBC   Chem       LFTs   Coagulopathy   Lipid Panel   A1c   Cardiac enzymes     U/A   CSF  Immunological  Other    STUDIES & IMAGING:  Studies (EKG, EEG, EMG, etc):     Radiology (XR, CT, MR, U/S, TTE/MARIE):

## 2021-03-07 NOTE — ED ADULT TRIAGE NOTE - CHIEF COMPLAINT QUOTE
high bp, yesterday was loosing balance but speech is fine this occurred around 9pm last night then went to sleep then went to urgent care now and they sent him here

## 2021-03-07 NOTE — CHART NOTE - NSCHARTNOTEFT_GEN_A_CORE
CAPRINI SCORE [CLOT] Score on Admission for     AGE RELATED RISK FACTORS                                                       MOBILITY RELATED FACTORS  [x ] Age 41-60 years                                            (1 Point)                  [ ] Bed rest                                                        (1 Point)  [ ] Age: 61-74 years                                           (2 Points)                 [ ] Plaster cast                                                   (2 Points)  [ ] Age= 75 years                                              (3 Points)                 [ ] Bed bound for more than 72 hours                 (2 Points)    DISEASE RELATED RISK FACTORS                                               GENDER SPECIFIC FACTORS  [ ] Edema in the lower extremities                       (1 Point)                  [ ] Pregnancy                                                     (1 Point)  [ ] Varicose veins                                               (1 Point)                  [ ] Post-partum < 6 weeks                                   (1 Point)             [ ] BMI > 25 Kg/m2                                            (1 Point)                  [ ] Hormonal therapy  or oral contraception          (1 Point)                 [ ] Sepsis (in the previous month)                        (1 Point)                  [ ] History of pregnancy complications                 (1 point)  [ ] Pneumonia or serious lung disease                                               [ ] Unexplained or recurrent                     (1 Point)           (in the previous month)                               (1 Point)  [ ] Abnormal pulmonary function test                     (1 Point)                 SURGERY RELATED RISK FACTORS (include planned surgeries)  [ ] Acute myocardial infarction                              (1 Point)                 [ ]  Section                                             (1 Point)  [ ] Congestive heart failure (in the previous month)  (1 Point)         [ ] Minor surgery                                                  (1 Point)   [ ] Inflammatory bowel disease                             (1 Point)                 [ ] Arthroscopic surgery                                        (2 Points)  [ ] Central venous access                                      (2 Points)                [ ] General surgery lasting more than 45 minutes   (2 Points)       [ x] Stroke (in the previous month)                          (5 Points)               [ ] Elective arthroplasty                                         (5 Points)            [ ] current or past malignancy                              (2 Points)                                                                                                       HEMATOLOGY RELATED FACTORS                                                 TRAUMA RELATED RISK FACTORS  [ ] Prior episodes of VTE                                     (3 Points)                [ ] Fracture of the hip, pelvis, or leg                       (5 Points)  [ ] Positive family history for VTE                         (3 Points)                 [ ] Acute spinal cord injury (in the previous month)  (5 Points)  [ ] Prothrombin 86165 A                                     (3 Points)                 [ ] Paralysis  (less than 1 month)                             (5 Points)  [ ] Factor V Leiden                                             (3 Points)                  [ ] Multiple Trauma within 1 month                        (5 Points)  [ ] Lupus anticoagulants                                     (3 Points)                                                           [ ] Anticardiolipin antibodies                               (3 Points)                                                       [ ] High homocysteine in the blood                      (3 Points)                                             [ ] Other congenital or acquired thrombophilia      (3 Points)                                                [ ] Heparin induced thrombocytopenia                  (3 Points)                                          Total Score [    6      ]    Risk:  Very low 0   Low 1 to 2   Moderate 3 to 4   High =5       VTE Prophylasix Recommednations:  [x ] mechanical pneumatic compression devices                                      [ ] contraindicated: _____________________  [ ] chemo prophylasix                                                                                   [ x] contraindicated _____new intracranial hemorrhage________________    **** HIGH LIKELIHOOD DVT PRESENT ON ADMISSION  [ x] (please order LE dopplers within 24 hours of admission)

## 2021-03-07 NOTE — H&P ADULT - NSHPLABSRESULTS_GEN_ALL_CORE
CT head w/ R thalamic hemorrhage with IVH, ICH score 2  03-07    135  |  98  |  13  ----------------------------<  334<H>  4.1   |  21<L>  |  0.62    Ca    9.5      07 Mar 2021 12:04    TPro  7.7  /  Alb  4.5  /  TBili  0.5  /  DBili  x   /  AST  18  /  ALT  26  /  AlkPhos  99  03-07    PT/INR - ( 07 Mar 2021 12:04 )   PT: 11.6 sec;   INR: 0.97 ratio         PTT - ( 07 Mar 2021 12:04 )  PTT:32.2 sec

## 2021-03-08 ENCOUNTER — APPOINTMENT (OUTPATIENT)
Dept: NEUROSURGERY | Facility: HOSPITAL | Age: 56
End: 2021-03-08

## 2021-03-08 PROBLEM — E78.5 HYPERLIPIDEMIA, UNSPECIFIED: Chronic | Status: ACTIVE | Noted: 2018-11-09

## 2021-03-08 PROBLEM — E11.9 TYPE 2 DIABETES MELLITUS WITHOUT COMPLICATIONS: Chronic | Status: ACTIVE | Noted: 2018-11-09

## 2021-03-08 PROBLEM — I10 ESSENTIAL (PRIMARY) HYPERTENSION: Chronic | Status: ACTIVE | Noted: 2018-11-09

## 2021-03-08 LAB
A1C WITH ESTIMATED AVERAGE GLUCOSE RESULT: 8.7 % — HIGH (ref 4–5.6)
ANION GAP SERPL CALC-SCNC: 13 MMOL/L — SIGNIFICANT CHANGE UP (ref 5–17)
BUN SERPL-MCNC: 15 MG/DL — SIGNIFICANT CHANGE UP (ref 7–23)
CALCIUM SERPL-MCNC: 9.3 MG/DL — SIGNIFICANT CHANGE UP (ref 8.4–10.5)
CHLORIDE SERPL-SCNC: 103 MMOL/L — SIGNIFICANT CHANGE UP (ref 96–108)
CHOLEST SERPL-MCNC: 234 MG/DL — HIGH
CO2 SERPL-SCNC: 22 MMOL/L — SIGNIFICANT CHANGE UP (ref 22–31)
CREAT SERPL-MCNC: 0.66 MG/DL — SIGNIFICANT CHANGE UP (ref 0.5–1.3)
ESTIMATED AVERAGE GLUCOSE: 203 MG/DL — HIGH (ref 68–114)
GLUCOSE BLDC GLUCOMTR-MCNC: 154 MG/DL — HIGH (ref 70–99)
GLUCOSE BLDC GLUCOMTR-MCNC: 172 MG/DL — HIGH (ref 70–99)
GLUCOSE BLDC GLUCOMTR-MCNC: 208 MG/DL — HIGH (ref 70–99)
GLUCOSE BLDC GLUCOMTR-MCNC: 217 MG/DL — HIGH (ref 70–99)
GLUCOSE SERPL-MCNC: 183 MG/DL — HIGH (ref 70–99)
HCT VFR BLD CALC: 41.6 % — SIGNIFICANT CHANGE UP (ref 39–50)
HDLC SERPL-MCNC: 54 MG/DL — SIGNIFICANT CHANGE UP
HGB BLD-MCNC: 14.1 G/DL — SIGNIFICANT CHANGE UP (ref 13–17)
LIPID PNL WITH DIRECT LDL SERPL: 141 MG/DL — HIGH
MAGNESIUM SERPL-MCNC: 2.1 MG/DL — SIGNIFICANT CHANGE UP (ref 1.6–2.6)
MCHC RBC-ENTMCNC: 30.5 PG — SIGNIFICANT CHANGE UP (ref 27–34)
MCHC RBC-ENTMCNC: 33.9 GM/DL — SIGNIFICANT CHANGE UP (ref 32–36)
MCV RBC AUTO: 90 FL — SIGNIFICANT CHANGE UP (ref 80–100)
MRSA PCR RESULT.: SIGNIFICANT CHANGE UP
NON HDL CHOLESTEROL: 180 MG/DL — HIGH
NRBC # BLD: 0 /100 WBCS — SIGNIFICANT CHANGE UP (ref 0–0)
PHOSPHATE SERPL-MCNC: 2.7 MG/DL — SIGNIFICANT CHANGE UP (ref 2.5–4.5)
PLATELET # BLD AUTO: 191 K/UL — SIGNIFICANT CHANGE UP (ref 150–400)
POTASSIUM SERPL-MCNC: 3.4 MMOL/L — LOW (ref 3.5–5.3)
POTASSIUM SERPL-SCNC: 3.4 MMOL/L — LOW (ref 3.5–5.3)
RBC # BLD: 4.62 M/UL — SIGNIFICANT CHANGE UP (ref 4.2–5.8)
RBC # FLD: 12.3 % — SIGNIFICANT CHANGE UP (ref 10.3–14.5)
S AUREUS DNA NOSE QL NAA+PROBE: SIGNIFICANT CHANGE UP
SODIUM SERPL-SCNC: 138 MMOL/L — SIGNIFICANT CHANGE UP (ref 135–145)
T3 SERPL-MCNC: 82 NG/DL — SIGNIFICANT CHANGE UP (ref 80–200)
T4 AB SER-ACNC: 6.5 UG/DL — SIGNIFICANT CHANGE UP (ref 4.6–12)
TRIGL SERPL-MCNC: 200 MG/DL — HIGH
TSH SERPL-MCNC: 0.92 UIU/ML — SIGNIFICANT CHANGE UP (ref 0.27–4.2)
WBC # BLD: 8.6 K/UL — SIGNIFICANT CHANGE UP (ref 3.8–10.5)
WBC # FLD AUTO: 8.6 K/UL — SIGNIFICANT CHANGE UP (ref 3.8–10.5)

## 2021-03-08 PROCEDURE — 93970 EXTREMITY STUDY: CPT | Mod: 26

## 2021-03-08 PROCEDURE — 36224 PLACE CATH CAROTD ART: CPT | Mod: 50

## 2021-03-08 PROCEDURE — 36227 PLACE CATH XTRNL CAROTID: CPT

## 2021-03-08 PROCEDURE — 36226 PLACE CATH VERTEBRAL ART: CPT | Mod: 50

## 2021-03-08 PROCEDURE — 99291 CRITICAL CARE FIRST HOUR: CPT

## 2021-03-08 PROCEDURE — 93306 TTE W/DOPPLER COMPLETE: CPT | Mod: 26

## 2021-03-08 RX ORDER — ATORVASTATIN CALCIUM 80 MG/1
40 TABLET, FILM COATED ORAL AT BEDTIME
Refills: 0 | Status: DISCONTINUED | OUTPATIENT
Start: 2021-03-08 | End: 2021-03-09

## 2021-03-08 RX ORDER — OXYCODONE HYDROCHLORIDE 5 MG/1
10 TABLET ORAL EVERY 4 HOURS
Refills: 0 | Status: DISCONTINUED | OUTPATIENT
Start: 2021-03-08 | End: 2021-03-10

## 2021-03-08 RX ORDER — ACETAMINOPHEN 500 MG
1000 TABLET ORAL ONCE
Refills: 0 | Status: COMPLETED | OUTPATIENT
Start: 2021-03-08 | End: 2021-03-08

## 2021-03-08 RX ORDER — NICOTINE POLACRILEX 2 MG
1 GUM BUCCAL DAILY
Refills: 0 | Status: DISCONTINUED | OUTPATIENT
Start: 2021-03-08 | End: 2021-03-13

## 2021-03-08 RX ORDER — OXYCODONE HYDROCHLORIDE 5 MG/1
5 TABLET ORAL EVERY 4 HOURS
Refills: 0 | Status: DISCONTINUED | OUTPATIENT
Start: 2021-03-08 | End: 2021-03-10

## 2021-03-08 RX ORDER — LABETALOL HCL 100 MG
100 TABLET ORAL EVERY 8 HOURS
Refills: 0 | Status: DISCONTINUED | OUTPATIENT
Start: 2021-03-08 | End: 2021-03-09

## 2021-03-08 RX ORDER — INSULIN LISPRO 100/ML
VIAL (ML) SUBCUTANEOUS AT BEDTIME
Refills: 0 | Status: DISCONTINUED | OUTPATIENT
Start: 2021-03-08 | End: 2021-03-08

## 2021-03-08 RX ORDER — INSULIN LISPRO 100/ML
VIAL (ML) SUBCUTANEOUS
Refills: 0 | Status: DISCONTINUED | OUTPATIENT
Start: 2021-03-08 | End: 2021-03-12

## 2021-03-08 RX ORDER — INSULIN LISPRO 100/ML
VIAL (ML) SUBCUTANEOUS
Refills: 0 | Status: DISCONTINUED | OUTPATIENT
Start: 2021-03-08 | End: 2021-03-08

## 2021-03-08 RX ORDER — FOLIC ACID 0.8 MG
1 TABLET ORAL DAILY
Refills: 0 | Status: DISCONTINUED | OUTPATIENT
Start: 2021-03-08 | End: 2021-03-13

## 2021-03-08 RX ORDER — ATORVASTATIN CALCIUM 80 MG/1
20 TABLET, FILM COATED ORAL AT BEDTIME
Refills: 0 | Status: DISCONTINUED | OUTPATIENT
Start: 2021-03-08 | End: 2021-03-08

## 2021-03-08 RX ORDER — THIAMINE MONONITRATE (VIT B1) 100 MG
100 TABLET ORAL DAILY
Refills: 0 | Status: DISCONTINUED | OUTPATIENT
Start: 2021-03-08 | End: 2021-03-13

## 2021-03-08 RX ORDER — INSULIN GLARGINE 100 [IU]/ML
8 INJECTION, SOLUTION SUBCUTANEOUS AT BEDTIME
Refills: 0 | Status: DISCONTINUED | OUTPATIENT
Start: 2021-03-08 | End: 2021-03-13

## 2021-03-08 RX ORDER — INSULIN LISPRO 100/ML
3 VIAL (ML) SUBCUTANEOUS
Refills: 0 | Status: DISCONTINUED | OUTPATIENT
Start: 2021-03-08 | End: 2021-03-09

## 2021-03-08 RX ORDER — LOSARTAN POTASSIUM 100 MG/1
100 TABLET, FILM COATED ORAL DAILY
Refills: 0 | Status: DISCONTINUED | OUTPATIENT
Start: 2021-03-08 | End: 2021-03-13

## 2021-03-08 RX ADMIN — Medication 650 MILLIGRAM(S): at 07:05

## 2021-03-08 RX ADMIN — Medication 1 MILLIGRAM(S): at 12:24

## 2021-03-08 RX ADMIN — Medication 1 TABLET(S): at 12:24

## 2021-03-08 RX ADMIN — Medication 2: at 12:25

## 2021-03-08 RX ADMIN — Medication 1 PATCH: at 17:13

## 2021-03-08 RX ADMIN — LOSARTAN POTASSIUM 100 MILLIGRAM(S): 100 TABLET, FILM COATED ORAL at 12:27

## 2021-03-08 RX ADMIN — SENNA PLUS 2 TABLET(S): 8.6 TABLET ORAL at 22:23

## 2021-03-08 RX ADMIN — Medication 3 UNIT(S): at 12:24

## 2021-03-08 RX ADMIN — INSULIN GLARGINE 8 UNIT(S): 100 INJECTION, SOLUTION SUBCUTANEOUS at 22:23

## 2021-03-08 RX ADMIN — OXYCODONE HYDROCHLORIDE 10 MILLIGRAM(S): 5 TABLET ORAL at 15:30

## 2021-03-08 RX ADMIN — CHLORHEXIDINE GLUCONATE 1 APPLICATION(S): 213 SOLUTION TOPICAL at 22:34

## 2021-03-08 RX ADMIN — Medication 100 MILLIGRAM(S): at 23:42

## 2021-03-08 RX ADMIN — POLYETHYLENE GLYCOL 3350 17 GRAM(S): 17 POWDER, FOR SOLUTION ORAL at 17:13

## 2021-03-08 RX ADMIN — ATORVASTATIN CALCIUM 40 MILLIGRAM(S): 80 TABLET, FILM COATED ORAL at 22:24

## 2021-03-08 RX ADMIN — OXYCODONE HYDROCHLORIDE 10 MILLIGRAM(S): 5 TABLET ORAL at 22:53

## 2021-03-08 RX ADMIN — OXYCODONE HYDROCHLORIDE 10 MILLIGRAM(S): 5 TABLET ORAL at 22:23

## 2021-03-08 RX ADMIN — Medication 400 MILLIGRAM(S): at 12:48

## 2021-03-08 RX ADMIN — Medication 4: at 06:34

## 2021-03-08 RX ADMIN — Medication 3 UNIT(S): at 17:13

## 2021-03-08 RX ADMIN — Medication 1 PATCH: at 19:10

## 2021-03-08 RX ADMIN — Medication 1000 MILLIGRAM(S): at 13:10

## 2021-03-08 RX ADMIN — Medication 40 MILLIEQUIVALENT(S): at 06:32

## 2021-03-08 RX ADMIN — Medication 2: at 17:14

## 2021-03-08 RX ADMIN — Medication 100 MILLIGRAM(S): at 12:24

## 2021-03-08 RX ADMIN — Medication 650 MILLIGRAM(S): at 06:30

## 2021-03-08 RX ADMIN — OXYCODONE HYDROCHLORIDE 10 MILLIGRAM(S): 5 TABLET ORAL at 14:28

## 2021-03-08 NOTE — PHYSICAL THERAPY INITIAL EVALUATION ADULT - PRECAUTIONS/LIMITATIONS, REHAB EVAL
CTH: Acute R thalamic hemorrhage with intraventricular extension, no midline shift, Mild ventriculomegaly is suggested (Cannot rule out developing hydrocephalus), Age-indeterminate infarcts involving bilateral basal ganglia and thalami. CTA head: Mild stenosis of the R intracranial ICA, Multiple moderate stenoses of the L intracranial vertebral artery./fall precautions

## 2021-03-08 NOTE — SPEECH LANGUAGE PATHOLOGY EVALUATION - SLP PERTINENT HISTORY OF CURRENT PROBLEM
PMHx: Pt is a 56 year old male with h/o HTN; noncompliant with medications. Pt presented with headache, imbalance, since yesterday, s/p fall 3/6 at 1400 after feeling dizzy, went to urgent care and found to have high BP yesterday. NIHSS 0. hyperglycemic on admission. CT head: R thalamic hemorrhage with IVH. ICH score 2. s/p angio 3/8: no aneurysm, no vascular malformation or venous anomaly.

## 2021-03-08 NOTE — SPEECH LANGUAGE PATHOLOGY EVALUATION - SLP PATIENT/FAMILY GOALS STATEMENT
Pt willing to participate in evaluation; Pt initially denied need for Khmer  however ultimately agreed during evaluation JIN [id number]

## 2021-03-08 NOTE — PHYSICAL THERAPY INITIAL EVALUATION ADULT - PLANNED THERAPY INTERVENTIONS, PT EVAL
stair training: GOAL: (to be met in 4 wks) negotiate 1 flight of stairs with 1 rail and appropriate assistive device with step to step pattern independently/balance training/gait training/strengthening/transfer training

## 2021-03-08 NOTE — PROGRESS NOTE ADULT - SUBJECTIVE AND OBJECTIVE BOX
SUMMARY: 55yo man presenting to ED with 1day history of headache and imbalance. CTH showing R thalamic hemorrhage with intraventricular extension. PMH HTN, hyperglycemic on admission, but does not take any medications at home.     OVERNIGHT EVENTS: Adm NSCU    ADMISSION SCORES:   GCS: 15 HH: MF: NIHSS: ICH Score: 1    REVIEW OF SYSTEMS: [] Unable to Assess due to neurologic exam   [ x] All ROS addressed below are non-contributory, except:  Neuro: [ x] Headache [ ] Back pain [ ] Numbness [ ] Weakness [ ] Ataxia [ ] Dizziness [ ] Aphasia [ ] Dysarthria [ ] Visual disturbance  Resp: [ ] Shortness of breath/dyspnea [ ] Orthopnea [ ] Cough  CV: [ ] Chest pain [ ] Palpitation [ ] Lightheadedness [ ] Syncope  Renal: [ ] Thirst [ ] Edema  GI: [ ] Nausea [ ] Emesis [ ] Abdominal pain [ ] Constipation [ ] Diarrhea  Hem: [ ] Hematemesis [ ] bBright red blood per rectum  ID: [ ] Fever [ ] Chills [ ] Dysuria  ENT: [ ] Rhinorrhea    VITALS: [x] Reviewed    IMAGING/DATA: [x] Reviewed    IVF FLUIDS/MEDICATIONS: [x] Reviewed    ALLERGIES: Allergies    No Known Allergies    Intolerances            ICU Vital Signs Last 24 Hrs  T(C): 36.8 (08 Mar 2021 03:00), Max: 37.2 (07 Mar 2021 11:47)  T(F): 98.2 (08 Mar 2021 03:00), Max: 98.9 (07 Mar 2021 11:47)  HR: 77 (08 Mar 2021 04:00) (74 - 110)  BP: 137/94 (07 Mar 2021 16:30) (137/94 - 223/133)  BP(mean): 106 (07 Mar 2021 16:30) (105 - 123)  ABP: 145/74 (08 Mar 2021 04:00) (117/53 - 168/85)  ABP(mean): 97 (08 Mar 2021 04:00) (73 - 111)  RR: 17 (08 Mar 2021 04:00) (13 - 24)  SpO2: 94% (08 Mar 2021 04:00) (92% - 100%)      03-07-21 @ 07:01  -  03-08-21 @ 06:24  --------------------------------------------------------  IN: 1630 mL / OUT: 1200 mL / NET: 430 mL        acetaminophen   Tablet .. 650 milliGRAM(s) Oral every 6 hours PRN  chlorhexidine 4% Liquid 1 Application(s) Topical <User Schedule>  influenza   Vaccine 0.5 milliLiter(s) IntraMuscular once  insulin lispro (ADMELOG) corrective regimen sliding scale   SubCutaneous every 6 hours  niCARdipine Infusion 5 mG/Hr (25 mL/Hr) IV Continuous <Continuous>  polyethylene glycol 3350 17 Gram(s) Oral two times a day  potassium chloride    Tablet ER 40 milliEquivalent(s) Oral every 4 hours  senna 2 Tablet(s) Oral at bedtime  sodium chloride 0.9%. 1000 milliLiter(s) (70 mL/Hr) IV Continuous <Continuous>                            15.6   7.56  )-----------( 211      ( 07 Mar 2021 12:04 )             45.9     03-07    138  |  101  |  15  ----------------------------<  249<H>  3.2<L>   |  23  |  0.79    Ca    9.5      07 Mar 2021 21:16  Phos  2.9     03-07  Mg     1.9     03-07    TPro  7.7  /  Alb  4.5  /  TBili  0.5  /  DBili  x   /  AST  18  /  ALT  26  /  AlkPhos  99  03-07    LIVER FUNCTIONS - ( 07 Mar 2021 12:04 )  Alb: 4.5 g/dL / Pro: 7.7 g/dL / ALK PHOS: 99 U/L / ALT: 26 U/L / AST: 18 U/L / GGT: x             DEVICES:   [] Restraints [x] PIVs [] ET tube [] central line [] PICC [] arterial line [] healy [] NGT/OGT [] EVD [] LD [] JAYNE/HMV [] LiCOX [] ICP monitor [] Trach [] PEG [] Chest Tube [] other:    EXAMINATION:  General: No acute distress  HEENT: Anicteric sclerae  Cardiac: [id number]  Lungs: Clear  Abdomen: Soft, non-tender, +BS  Extremities: No c/c/e  Skin/Incision Site: Clean, dry and intact  Neurologic: Awake, alert, fully oriented, follows commands, PERRL, EOMI, face symmetric, tongue midline, no drift, full strength SUMMARY: 57yo man presenting to ED with 1day history of headache and imbalance. CTH showing R thalamic hemorrhage with intraventricular extension. PMH HTN, hyperglycemic on admission, but does not take any medications at home.     OVERNIGHT EVENTS: Adm NSCU    ADMISSION SCORES:   GCS: 15 HH: MF: NIHSS: ICH Score: 1    REVIEW OF SYSTEMS: [] Unable to Assess due to neurologic exam   [ x] All ROS addressed below are non-contributory, except:  Neuro: [ x] Headache [ ] Back pain [ ] Numbness [ ] Weakness [ ] Ataxia [ ] Dizziness [ ] Aphasia [ ] Dysarthria [ ] Visual disturbance  Resp: [ ] Shortness of breath/dyspnea [ ] Orthopnea [ ] Cough  CV: [ ] Chest pain [ ] Palpitation [ ] Lightheadedness [ ] Syncope  Renal: [ ] Thirst [ ] Edema  GI: [ ] Nausea [ ] Emesis [ ] Abdominal pain [ ] Constipation [ ] Diarrhea  Hem: [ ] Hematemesis [ ] bBright red blood per rectum  ID: [ ] Fever [ ] Chills [ ] Dysuria  ENT: [ ] Rhinorrhea    VITALS: [x] Reviewed    IMAGING/DATA: [x] Reviewed    IVF FLUIDS/MEDICATIONS: [x] Reviewed    ALLERGIES: Allergies    No Known Allergies    Intolerances        ICU Vital Signs Last 24 Hrs  T(C): 36.8 (08 Mar 2021 03:00), Max: 37.2 (07 Mar 2021 11:47)  T(F): 98.2 (08 Mar 2021 03:00), Max: 98.9 (07 Mar 2021 11:47)  HR: 77 (08 Mar 2021 04:00) (74 - 110)  BP: 137/94 (07 Mar 2021 16:30) (137/94 - 223/133)  BP(mean): 106 (07 Mar 2021 16:30) (105 - 123)  ABP: 145/74 (08 Mar 2021 04:00) (117/53 - 168/85)  ABP(mean): 97 (08 Mar 2021 04:00) (73 - 111)  RR: 17 (08 Mar 2021 04:00) (13 - 24)  SpO2: 94% (08 Mar 2021 04:00) (92% - 100%)      03-07-21 @ 07:01  -  03-08-21 @ 06:24  --------------------------------------------------------  IN: 1630 mL / OUT: 1200 mL / NET: 430 mL        acetaminophen   Tablet .. 650 milliGRAM(s) Oral every 6 hours PRN  chlorhexidine 4% Liquid 1 Application(s) Topical <User Schedule>  influenza   Vaccine 0.5 milliLiter(s) IntraMuscular once  insulin lispro (ADMELOG) corrective regimen sliding scale   SubCutaneous every 6 hours  niCARdipine Infusion 5 mG/Hr (25 mL/Hr) IV Continuous <Continuous>  polyethylene glycol 3350 17 Gram(s) Oral two times a day  potassium chloride    Tablet ER 40 milliEquivalent(s) Oral every 4 hours  senna 2 Tablet(s) Oral at bedtime  sodium chloride 0.9%. 1000 milliLiter(s) (70 mL/Hr) IV Continuous <Continuous>                            15.6   7.56  )-----------( 211      ( 07 Mar 2021 12:04 )             45.9     03-07    138  |  101  |  15  ----------------------------<  249<H>  3.2<L>   |  23  |  0.79    Ca    9.5      07 Mar 2021 21:16  Phos  2.9     03-07  Mg     1.9     03-07    TPro  7.7  /  Alb  4.5  /  TBili  0.5  /  DBili  x   /  AST  18  /  ALT  26  /  AlkPhos  99  03-07    LIVER FUNCTIONS - ( 07 Mar 2021 12:04 )  Alb: 4.5 g/dL / Pro: 7.7 g/dL / ALK PHOS: 99 U/L / ALT: 26 U/L / AST: 18 U/L / GGT: x             DEVICES:   [] Restraints [x] PIVs [] ET tube [] central line [] PICC [] arterial line [] healy [] NGT/OGT [] EVD [] LD [] JAYNE/HMV [] LiCOX [] ICP monitor [] Trach [] PEG [] Chest Tube [] other:    EXAMINATION:  General: No acute distress  HEENT: Anicteric sclerae  Cardiac: [id number]  Lungs: Clear  Abdomen: Soft, non-tender, +BS  Extremities: No c/c/e  Skin/Incision Site: Clean, dry and intact  Neurologic: Awake, alert, fully oriented, follows commands, PERRL, EOMI, face symmetric, tongue midline, mild LUE drift, full strength

## 2021-03-08 NOTE — PHYSICAL THERAPY INITIAL EVALUATION ADULT - PERTINENT HX OF CURRENT PROBLEM, REHAB EVAL
PMHx: HTN noncompliant with medications. presents with headache, imbalance, since yesterday, s/p fall 3/6 at 1400 after feeling dizzy, went to urgent care and found to have high BP yesterday. NIHSS 0. hyperglycemic on admission. CT head: R thalamic hemorrhage with IVH. ICH score 2. s/p angio 3/8: no aneurysm, no vascular malformation or venous anomaly

## 2021-03-08 NOTE — OCCUPATIONAL THERAPY INITIAL EVALUATION ADULT - PLANNED THERAPY INTERVENTIONS, OT EVAL
ADL retraining/parent/caregiver training.../transfer training ADL retraining/cognitive, visual perceptual/parent/caregiver training.../transfer training

## 2021-03-08 NOTE — SPEECH LANGUAGE PATHOLOGY EVALUATION - SLP CRITERIA FOR SKILLED THERAPEUTIC INTERVENTIONS MET
Pt reports English skills to be at baseline, however consider therapy to further address cognitive linguistic skills if Pt agrees; Pt denies need at this time./skilled criteria for intervention met

## 2021-03-08 NOTE — PHYSICAL THERAPY INITIAL EVALUATION ADULT - ADDITIONAL COMMENTS
lives with wife in apartment with 2 steps to enter without rails, and 1 flight inside with bilateral rails, right hand dominant, wears reading glasses

## 2021-03-08 NOTE — PROGRESS NOTE ADULT - ASSESSMENT
56M hx HTN noncompliant with medications p/w headache, imbalance, since yesterday, CT head w/ R thalamic hemorrhage with IVH, ICH score 2. Exam: AAOx3, eomi, no facial, PERRL, no drift, WILSON 5/5.   -Pre-op for angio

## 2021-03-08 NOTE — PROGRESS NOTE ADULT - SUBJECTIVE AND OBJECTIVE BOX
SUMMARY:  56 year-old man with history of hypertension (on no home emdications) presented to ED 3/7/21 with 1 day history of headache and imbalance. CT Head revealed right thalamic hemorrhage with intraventricular extension.     ADMISSION SCORES:   GCS: 15 HH: MF: NIHSS: ICH Score: 1    HOSPITAL COURSE:  3/7: Admitted to NSCU  3/8: Angio negative, doppler LE negative     24 HOUR EVENTS:  Angio negative     REVIEW OF SYSTEMS: [] Unable to Assess due to neurologic exam   [x] All ROS addressed below are non-contributory, except:  Neuro: [x] Headache [ ] Back pain [ ] Numbness [ ] Weakness [ ] Ataxia [ ] Dizziness [ ] Aphasia [ ] Dysarthria [ ] Visual disturbance  Resp: [ ] Shortness of breath/dyspnea [ ] Orthopnea [ ] Cough  CV: [ ] Chest pain [ ] Palpitation [ ] Lightheadedness [ ] Syncope  Renal: [ ] Thirst [ ] Edema  GI: [ ] Nausea [ ] Emesis [ ] Abdominal pain [ ] Constipation [ ] Diarrhea  Hem: [ ] Hematemesis [ ] Bright red blood per rectum  ID: [ ] Fever [ ] Chills [ ] Dysuria  ENT: [ ] Rhinorrhea    VITALS/DATA/ORDERS: [x] Reviewed    EXAMINATION:  General: Cooperative  HEENT: Anicteric sclerae  Cardiac: [id number]  Lungs: Clear  Abdomen: Soft, non-tender, +BS  Extremities: No c/c/e  Skin/Incision Site: Clean, dry and intact  Neurologic: Awake, alert, fully oriented, follows commands, PERRL, EOMI, face symmetric, tongue midline, left arm drift, but full strength on confrontation

## 2021-03-08 NOTE — PROGRESS NOTE ADULT - SUBJECTIVE AND OBJECTIVE BOX
Patient seen and examined at bedside.    --Anticoagulation--    T(C): 36.8 (03-08-21 @ 03:00), Max: 37.2 (03-07-21 @ 11:47)  HR: 77 (03-08-21 @ 04:00) (74 - 110)  BP: 137/94 (03-07-21 @ 16:30) (137/94 - 223/133)  RR: 17 (03-08-21 @ 04:00) (13 - 24)  SpO2: 94% (03-08-21 @ 04:00) (92% - 100%)  Wt(kg): --    Exam: oriented to name, WILSON strongly AG

## 2021-03-08 NOTE — OCCUPATIONAL THERAPY INITIAL EVALUATION ADULT - PERTINENT HX OF CURRENT PROBLEM, REHAB EVAL
56M here w/ headache, imbalance, fall from 1400 3/6. As per patient, had posterior distribution headache yesterday, moderate intensity. PE unremarkable, CT head w/ R BG/thalamus hemorrhage likely 2/2 HTN. Would recommend BP control, neurosurgical consult for additional management and care. Pt s/p cerebral angiogram 3/8.

## 2021-03-08 NOTE — SPEECH LANGUAGE PATHOLOGY EVALUATION - SLP DIAGNOSIS
Pt is a 56 y/o male with R thalamic hemorrhage with intraventricular extension and ventriculomegaly who presents with functional receptive and expressive language skills in Sinhala based upon .  Of note, auditory comprehension/processing was decreased at complex level in English and may be secondary to Sinhala being primary language.  Speech intelligibility is judged to be good.  Unable to assess reading and writing given need for .

## 2021-03-08 NOTE — CHART NOTE - NSCHARTNOTEFT_GEN_A_CORE
Interventional Neuro- Radiology   Procedure Note PA-C    Procedure: Selective Cerebral Angiography   Pre- Procedure Diagnosis: right thalamic hemorrhage   Post- Procedure Diagnosis:    : Dr Arnold Rainey   Physician Assistant: MELANIE Martinez PA-C    Nurse:  Radiologic Tech:  Anesthesiologist:  Sheath:      I/Os:EBL less than 10cc  IV fluids:    Urine output     cc Contrast Omnipaque 240           Vitals: BP         HR      Spo2     %          Preliminary Report:  Using a 5 Taiwanese short sheath to the right groin under MAC sedation via left vertebral artery, left internal carotid artery, left external carotid artery, right vertebral artery, right internal carotid artery, right external carotid artery a selective cerebral angiography was performed and demonstrated                       Official note to follow.  Patient tolerated procedure well, hemodynamically stable, no change in neurological status compared to baseline.  Results discussed with neuro ICU team and patient. Right groin sheath was removed, manual compression held to hemostasis for 20 minutes, no active bleeding, no hematoma, quick clot and safeguard balloon dressing applied at Interventional Neuro- Radiology   Procedure Note PA-C    Procedure: Selective Cerebral Angiography   Pre- Procedure Diagnosis: right thalamic hemorrhage   Post- Procedure Diagnosis:    : Dr Arnold Rainey   Physician Assistant: MELANIE Martinez PA-C    Nurse:                   Norris Wisdom RN  Radiologic Tech:  Anesthesiologist:  Sheath:      I/Os: EBL less than 10cc  IV fluids:    Urine due to void Contrast Omnipaque 240           Vitals: BP         HR      Spo2 100%          Preliminary Report:  Using a 5 Somali short sheath to the right groin under MAC sedation via left vertebral artery, left internal carotid artery, left external carotid artery, right vertebral artery, right internal carotid artery, right external carotid artery a selective cerebral angiography was performed and demonstrated                       Official note to follow.  Patient tolerated procedure well, hemodynamically stable, no change in neurological status compared to baseline. Results discussed with neuro ICU team and patient. Right groin sheath was removed, manual compression held to hemostasis for 20 minutes, no active bleeding, no hematoma, quick clot and safeguard balloon dressing applied at Interventional Neuro- Radiology   Procedure Note PA-C    Procedure: Selective Cerebral Angiography   Pre- Procedure Diagnosis: right thalamic hemorrhage   Post- Procedure Diagnosis:    : Dr Arnold Rainey   Physician Assistant: MELANIE Martinez PA-C    Nurse:                   Norris Wisdom RN  Radiologic Tech:     Damian Mart LRT   Anesthesiologist:                                                  LEEANN Goodson  Sheath:      I/Os: EBL less than 10cc  IV fluids:    Urine due to void Contrast Omnipaque 240           Vitals: BP         HR      Spo2 100%          Preliminary Report:  Using a 5 Irish short sheath to the right groin under MAC sedation via left vertebral artery, left internal carotid artery, left external carotid artery, right vertebral artery, right internal carotid artery, right external carotid artery a selective cerebral angiography was performed and demonstrated                       Official note to follow.  Patient tolerated procedure well, hemodynamically stable, no change in neurological status compared to baseline. Results discussed with neuro ICU team and patient. Right groin sheath was removed, manual compression held to hemostasis for 20 minutes, no active bleeding, no hematoma, quick clot and safeguard balloon dressing applied at Interventional Neuro- Radiology   Procedure Note PA-C    Procedure: Selective Cerebral Angiography   Pre- Procedure Diagnosis: right thalamic hemorrhage   Post- Procedure Diagnosis:    : Dr Arnold Rainey   Physician Assistant: MELANIE Martinez PA-C    Nurse:                     Norris Wisdom RN  Radiologic Tech:     Damian Mart LRT   Anesthesiologist:                                                   LEEANN Goodson  Sheath:      I/Os: EBL less than 10cc  IV fluids:    Urine due to void Contrast Omnipaque 240           Vitals: BP         HR      Spo2 100%          Preliminary Report:  Using a 5 Icelandic short sheath to the right groin under MAC sedation via left vertebral artery, left internal carotid artery, left external carotid artery, right vertebral artery, right internal carotid artery, right external carotid artery a selective cerebral angiography was performed and demonstrated                       Official note to follow.  Patient tolerated procedure well, hemodynamically stable, no change in neurological status compared to baseline. Results discussed with neuro ICU team and patient. Right groin sheath was removed, manual compression held to hemostasis for 20 minutes, no active bleeding, no hematoma, quick clot and safeguard balloon dressing applied at Interventional Neuro- Radiology   Procedure Note PA-C    Procedure: Selective Cerebral Angiography   Pre- Procedure Diagnosis: right thalamic hemorrhage   Post- Procedure Diagnosis: no aneurysm, no vascular malformation     : Dr Arnold Rainey   Physician Assistant: MELANIE Martinez PA-C    Nurse:                     Norris Wisdom RN  Radiologic Tech:     Damian Mart LRT   Anesthesiologist:                                                     LEEANN Goodson  Sheath:      I/Os: EBL less than 10cc  IV fluids:    Urine due to void Contrast Omnipaque 240           Vitals: BP         HR      Spo2 100%          Preliminary Report:  Using a 5 Danish short sheath to the right groin under MAC sedation via left vertebral artery, left internal carotid artery, left external carotid artery, right vertebral artery, right internal carotid artery, right external carotid artery a selective cerebral angiography was performed and demonstrated                       Official note to follow.  Patient tolerated procedure well, hemodynamically stable, no change in neurological status compared to baseline. Results discussed with neuro ICU team and patient. Right groin sheath was removed, manual compression held to hemostasis for 20 minutes, no active bleeding, no hematoma, quick clot and safeguard balloon dressing applied at Interventional Neuro- Radiology   Procedure Note PA-C    Procedure: Selective Cerebral Angiography   Pre- Procedure Diagnosis: right thalamic hemorrhage   Post- Procedure Diagnosis: no aneurysm, no vascular malformation     : Dr Arnold Rainey   Physician Assistant: MELANIE Martinez PA-C    Nurse:                     Norris Wisdom RN  Radiologic Tech:     Damian Mart LRT   Anesthesiologist:     Dr Delmy Goodson  Sheath:                    5 Chinese short sheath       I/Os: EBL less than 10cc  IV fluids:    Urine due to void Contrast Omnipaque 240           Vitals: BP         HR      Spo2 100%          Preliminary Report:  Using a 5 Chinese short sheath to the right groin under MAC sedation via left vertebral artery, left internal carotid artery, left external carotid artery, right vertebral artery, right internal carotid artery, right external carotid artery a selective cerebral angiography was performed and demonstrated                       Official note to follow.  Patient tolerated procedure well, hemodynamically stable, no change in neurological status compared to baseline. Results discussed with neuro ICU team and patient. Right groin sheath was removed, manual compression held to hemostasis for 20 minutes, no active bleeding, no hematoma, quick clot and safeguard balloon dressing applied at Interventional Neuro- Radiology   Procedure Note PA-C    Procedure: Selective Cerebral Angiography   Pre- Procedure Diagnosis: right thalamic hemorrhage   Post- Procedure Diagnosis: no aneurysm, no vascular malformation     : Dr Arnold Rainey   Physician Assistant: MELANIE Martinez PA-C    Nurse:                     Norris Wisdom RN  Radiologic Tech:     Damian Mart LRT   Anesthesiologist:     Dr Delmy Goodson  Sheath:                    5 Uzbek short sheath       I/Os: EBL less than 10cc  IV fluids:    Urine due to void Contrast Omnipaque 240           Vitals: /73        HR 74     Spo2 100%          Preliminary Report:  Using a 5 Uzbek short sheath to the right groin under MAC sedation via left vertebral artery, left internal carotid artery, left external carotid artery, right vertebral artery, right internal carotid artery, right external carotid artery a selective cerebral angiography was performed and demonstrated                       Official note to follow.  Patient tolerated procedure well, hemodynamically stable, no change in neurological status compared to baseline. Results discussed with neuro ICU team and patient. Right groin sheath was removed, manual compression held to hemostasis for 20 minutes, no active bleeding, no hematoma, quick clot and safeguard balloon dressing applied at Interventional Neuro- Radiology   Procedure Note PA-C    Procedure: Selective Cerebral Angiography   Pre- Procedure Diagnosis: right thalamic hemorrhage   Post- Procedure Diagnosis: no aneurysm, no vascular malformation or venous anomaly     : Dr Arnold Rainey   Physician Assistant: MELANIE Martinez PA-C    Nurse:                     Norris Wisdom RN  Radiologic Tech:     Damian Mart LRT   Anesthesiologist:     Dr Delmy Goodson  Sheath:                    5 Rwandan short sheath       I/Os: EBL less than 10cc  IV fluids:    Urine due to void Contrast Omnipaque 240           Vitals: /73        HR 74     Spo2 100%          Preliminary Report:  Using a 5 Rwandan short sheath to the right groin under MAC sedation via left vertebral artery, left internal carotid artery, left external carotid artery, right vertebral artery, right internal carotid artery, right external carotid artery a selective cerebral angiography was performed and demonstrated                       Official note to follow.  Patient tolerated procedure well, hemodynamically stable, no change in neurological status compared to baseline. Results discussed with neuro ICU team and patient. Right groin sheath was removed, manual compression held to hemostasis for 20 minutes, no active bleeding, no hematoma, quick clot and safeguard balloon dressing applied at 10:00am. Interventional Neuro- Radiology   Procedure Note PA-C    Procedure: Selective Cerebral Angiography   Pre- Procedure Diagnosis: right thalamic hemorrhage   Post- Procedure Diagnosis: no aneurysm, no vascular malformation or venous anomaly     : Dr Arnold Rainey   Physician Assistant: MELANIE Martinez PA-C    Nurse:                     Norris Wisdom RN  Radiologic Tech:     Damian Mart LRT   Anesthesiologist:     Dr Delmy Goodson  Sheath:                    5 Montenegrin short sheath       I/Os: EBL less than 10cc  IV fluids: 150cc Urine due to void Contrast Omnipaque 240 107cc           Vitals: /73        HR 74     Spo2 100%          Preliminary Report:  Using a 5 Montenegrin short sheath to the right groin under MAC sedation via left vertebral artery, left internal carotid artery, left external carotid artery, right vertebral artery, right internal carotid artery, right external carotid artery a selective cerebral angiography was performed and demonstrated no aneurysm, no vascular malformation and no venous anomaly. Official note to follow.  Patient tolerated procedure well, hemodynamically stable, no change in neurological status compared to baseline. Results discussed with neuro ICU team and patient. Right groin sheath was removed, a minx device and manual compression held to hemostasis for 20 minutes, no active bleeding, no hematoma, quick clot and safeguard balloon dressing applied at 10:00am. Disposition Neuro ICU. Interventional Neuro- Radiology   Procedure Note PA-C    Procedure: Selective Cerebral Angiography   Pre- Procedure Diagnosis: right thalamic hemorrhage   Post- Procedure Diagnosis: no aneurysm, no vascular malformation or venous anomaly     : Dr Arnold Rainey   Physician Assistant: MELANIE Martinez PA-C    Nurse:                     Norris Wisdom RN  Radiologic Tech:     Damian Mart LRT   Anesthesiologist:     Dr Delmy Morales   Sheath:                    5 German short sheath       I/Os: EBL less than 10cc  IV fluids: 150cc Urine due to void Contrast Omnipaque 240 107cc           Vitals: /73        HR 74     Spo2 100%          Preliminary Report:  Using a 5 German short sheath to the right groin under MAC sedation via left vertebral artery, left internal carotid artery, left external carotid artery, right vertebral artery, right internal carotid artery, right external carotid artery a selective cerebral angiography was performed and demonstrated no aneurysm, no vascular malformation and no venous anomaly. Official note to follow.  Patient tolerated procedure well, hemodynamically stable, no change in neurological status compared to baseline. Results discussed with neuro ICU team and patient. Right groin sheath was removed, a minx device and manual compression held to hemostasis for 20 minutes, no active bleeding, no hematoma, quick clot and safeguard balloon dressing applied at 10:00am. Disposition Neuro ICU.

## 2021-03-08 NOTE — SPEECH LANGUAGE PATHOLOGY EVALUATION - SLP CONVERSATIONAL SPEECH
WNL per conversation with [place] ; Skills deemed to be WFL for basic, simple conversational exchanges in English

## 2021-03-08 NOTE — OCCUPATIONAL THERAPY INITIAL EVALUATION ADULT - NS ASR FOLLOW COMMAND OT EVAL
decreased attention, did not know that he had a stroke, poor insight-asking if he could go home tomorrow/100% of the time/able to follow single-step instructions

## 2021-03-08 NOTE — PROGRESS NOTE ADULT - ASSESSMENT
ASSESSMENT/PLAN: R thalamic hemorrhage with intraventricular extension and ventriculomegaly    NEURO:  stability CTH in 6hrs from the first one  monitor for s/s hydrocephalus given IVH  conventional angiogram plan per neurosurgery  stroke neuro consult  stroke core measures  Activity: [] mobilize as tolerated [x] Bedrest [] PT [] OT [] PMNR    PULM:  encourage incentive spirometry as able    CV:  SBP goal 100-160, nicardipine gtt prn  arterial line placement  TTE      RENAL:  Fluids: IVF    GI:  Diet: dysphagia screen and advance diet  NPO after midnight  GI prophylaxis [x] not indicated [] PPI [] other:  Bowel regimen [] colace [x] senna [] other:    ENDO:   Goal euglycemia (-180)  insulin gtt, if FS>200x2    HEME/ONC:  VTE prophylaxis: [x] SCDs [] chemoprophylaxis [x] hold chemoprophylaxis due to: ICH with IVH [] high risk of DVT/PE on admission due to:    ID: monitor for fevers    MISC:    SOCIAL/FAMILY:  [x] awaiting [] updated at bedside [] family meeting    CODE STATUS:  [x] Full Code [] DNR [] DNI [] Palliative/Comfort Care    DISPOSITION:  [x] ICU [] Stroke Unit [] Floor [] EMU [] RCU [] PCU    [x] Patient is at high risk of neurologic deterioration/death due to: intracranial hemorrhage expansion, intraventricular hemorrhage, hydrocephalus, brain comrpession     ASSESSMENT/PLAN: R thalamic hemorrhage with intraventricular extension and ventriculomegaly    NEURO:  CTH reviewed   CTH tomorrow AM   monitor for s/s hydrocephalus given IVH  conventional angiogram -ve   stroke neuro consult  stroke core measures  Activity: [] mobilize as tolerated [x] Bedrest [] PT [] OT [] PMNR    PULM:  encourage incentive spirometry as able    CV:  SBP goal 100-160, nicardipine gtt prn  arterial line placement  losartan   TTE      RENAL:  Fluids: IVL    GI:  Diet: dysphagia screen and advance diet  NPO after midnight  GI prophylaxis [x] not indicated [] PPI [] other:  Bowel regimen [] colace [x] senna [] other:    ENDO:   Goal euglycemia (-180)  Lantus 8 HS and 3 unit premeals     HEME/ONC:  VTE prophylaxis: [x] SCDs [] chemoprophylaxis [x] hold chemoprophylaxis due to: ICH with IVH [] high risk of DVT/PE on admission due to:    ID: monitor for fevers    MISC:    SOCIAL/FAMILY:  [x] awaiting [] updated at bedside [] family meeting    CODE STATUS:  [x] Full Code [] DNR [] DNI [] Palliative/Comfort Care    DISPOSITION:  [x] ICU [] Stroke Unit [] Floor [] EMU [] RCU [] PCU    [x] Patient is at high risk of neurologic deterioration/death due to: intracranial hemorrhage expansion, intraventricular hemorrhage, hydrocephalus, brain comrpession     ASSESSMENT/PLAN: R thalamic hemorrhage with intraventricular extension and ventriculomegaly    NEURO:  CTH reviewed   CTH tomorrow AM   monitor for s/s hydrocephalus given IVH  conventional angiogram -ve   stroke neuro consult  stroke core measures  Activity: [] mobilize as tolerated [x] Bedrest [] PT [] OT [] PMNR    PULM:  encourage incentive spirometry as able    CV:  SBP goal 100-160, nicardipine gtt prn   arterial line placement  losartan   TTE      RENAL:  Fluids: IVL    GI:  Diet: dysphagia screen and advance diet  NPO after midnight  GI prophylaxis [x] not indicated [] PPI [] other:  Bowel regimen [] colace [x] senna [] other:    ENDO:   Goal euglycemia (-180)  Lantus 8 HS and 3 unit premeals     HEME/ONC:  VTE prophylaxis: [x] SCDs [] chemoprophylaxis [x] hold chemoprophylaxis due to: ICH with IVH [] high risk of DVT/PE on admission due to:    ID: monitor for fevers    MISC:    SOCIAL/FAMILY:  [x] awaiting [] updated at bedside [] family meeting    CODE STATUS:  [x] Full Code [] DNR [] DNI [] Palliative/Comfort Care    DISPOSITION:  [x] ICU [] Stroke Unit [] Floor [] EMU [] RCU [] PCU    [x] Patient is at high risk of neurologic deterioration/death due to: intracranial hemorrhage expansion, intraventricular hemorrhage, hydrocephalus, brain comrpession

## 2021-03-08 NOTE — SPEECH LANGUAGE PATHOLOGY EVALUATION - COMMENTS
could not assess as Pt reports Arabic to be primary language CTH: Acute R thalamic hemorrhage with intraventricular extension, no midline shift, Mild ventriculomegaly is suggested (Cannot rule out developing hydrocephalus), Age-indeterminate infarcts involving bilateral basal ganglia and thalami. 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 could not assess as Pt reports Portuguese to be primary language; Pt reads Portuguese newspaper Pt cooperative and easily engaged in conversation; aware of reason for hospitalization.  Pt stated that he speaks both English and Ukrainian but initially wanted to proceed with evaluation in English however breakdown in communication noted with more complex auditory stimuli which improved when  implemented.  Pt required encouragement to verbalize need for  phone given decreased awareness of communication breakdown. Per d/w Nsg, Pt with increased insight to situation upon counselling by MD.  Pt explained to SLP that he is in the hospital due to stroke. Skills WFL in Belarusian although decreased with more complex tasks in English

## 2021-03-08 NOTE — CHART NOTE - NSCHARTNOTEFT_GEN_A_CORE
Interventional Neuro Radiology  Pre-Procedure Note PA-C    This is a 56 year old right hand dominant male who presented with complaints of headache, imbalance, since yesterday, CT head with right thalamic hemorrhage with IVH, ICH score 2. Patient was transported to Neuro IR for a selective cerebral angiograpy to determine a source for hemorrhage.     Upon exam patient is A + O x 3, speech is fluent, recent and remote memory intact, follows commands, Incomplete note       Allergies: No Known Allergies  PMHX: hypertension, CAD  PSHX: Cardiac Stent 2019  Social History: ,   FAMILY HISTORY: non-contributory     Current Medications: acetaminophen   Tablet 650 milliGRAM(s) Oral every 6 hours PRN  chlorhexidine 4% Liquid 1 Application(s) Topical   influenza   Vaccine 0.5 milliLiter(s) IntraMuscular once  insulin lispro (ADMELOG) corrective regimen sliding scale   SubCutaneous every 6 hours  niCARdipine Infusion 5 mG/Hr IV Continuous <Continuous>  polyethylene glycol 3350 17 Gram(s) Oral two times a day  senna 2 Tablet(s) Oral at bedtime  sodium chloride 0.9%. 1000 milliLiter(s) IV Continuous     Covid: not detected   CBC:                        15.6   7.56  )-----------( 211      ( 07 Mar 2021 12:04 )             45.9       03-07    138  |  101  |  15  ----------------------------<  249<H>  3.2<L>   |  23  |  0.79    Ca    9.5      07 Mar 2021 21:16  Phos  2.9     03-07  Mg     1.9     03-07    TPro  7.7  /  Alb  4.5  /  TBili  0.5  /  DBili  x   /  AST  18  /  ALT  26  /  AlkPhos  99  03-07      Blood Bank: 0 positive available     Assessment/Plan:   This is a 56 year old right hand dominant male with a right thalamic hemorrhage, who presents to Neuro IR for a selective cerebral angiography to determine a source for the hemorrhage. Procedure, goals, risks, benefits and alternatives were discussed with patient All questions were answered. Risks include but are not limited to stroke, vessel injury, hemorrhage, and or right groin hematoma.  Patient demonstrates understanding of all risks involved with this procedure and wishes to continue.  Appropriate content was obtained from patient and consent is in the patient's chart. Interventional Neuro Radiology  Pre-Procedure Note PA-C    This is a 56 year old right hand dominant male who presented with complaints of headache, imbalance, since yesterday, CT head with right thalamic hemorrhage with IVH, ICH score 2. Patient was transported to Neuro IR for a selective cerebral angiography to determine a source for hemorrhage.     Upon exam patient is A + O x 3, speech is fluent, recent and remote memory intact, follows commands, Incomplete note     Allergies: No Known Allergies  PMHX: hypertension, CAD  PSHX: Cardiac Stent 2019  Social History: ,   FAMILY HISTORY: non-contributory     Current Medications: acetaminophen   Tablet 650 milliGRAM(s) Oral every 6 hours PRN  chlorhexidine 4% Liquid 1 Application(s) Topical   influenza   Vaccine 0.5 milliLiter(s) IntraMuscular once  insulin lispro (ADMELOG) corrective regimen sliding scale   SubCutaneous every 6 hours  niCARdipine Infusion 5 mG/Hr IV Continuous  polyethylene glycol 3350 17 Gram(s) Oral two times a day  senna 2 Tablet(s) Oral at bedtime  sodium chloride 0.9%. 1000 milliLiter(s) IV Continuous     Covid: not detected     CBC:                        15.6   7.56  )-----------( 211      ( 07 Mar 2021 12:04 )             45.9       03-07    138  |  101  |  15  ----------------------------<  249<H>  3.2<L>   |  23  |  0.79    Ca    9.5      07 Mar 2021 21:16  Phos  2.9     03-07  Mg     1.9     03-07    TPro  7.7  /  Alb  4.5  /  TBili  0.5  /  DBili  x   /  AST  18  /  ALT  26  /  AlkPhos  99  03-07      Blood Bank: 0 positive available     Assessment/Plan:   This is a 56 year old right hand dominant male with a right thalamic hemorrhage, who presents to Neuro IR for a selective cerebral angiography to determine a source for the hemorrhage. Procedure, goals, risks, benefits and alternatives were discussed with patient All questions were answered. Risks include but are not limited to stroke, vessel injury, hemorrhage, and or right groin hematoma.  Patient demonstrates understanding of all risks involved with this procedure and wishes to continue. Appropriate content was obtained from patient and consent is in the patient's chart. Interventional Neuro Radiology  Pre-Procedure Note PA-C    This is a 56 year old right hand dominant male who presented with complaints of headache, imbalance, since yesterday, CT head with right thalamic hemorrhage with IVH, ICH score 2. Patient was transported to Neuro IR for a selective cerebral angiography to determine a source for hemorrhage.     Upon exam patient is A + O x 3, speech is fluent, recent and remote memory intact, follows commands, PERRL, EOMI, tongue is midline, mild left pronator drift, moves all extremities.     Allergies: No Known Allergies  PMHX: hypertension, CAD  PSHX: Cardiac Stent 2019  Social History:    FAMILY HISTORY: non-contributory     Current Medications: acetaminophen   Tablet 650 milliGRAM(s) Oral every 6 hours PRN  chlorhexidine 4% Liquid 1 Application(s) Topical   influenza   Vaccine 0.5 milliLiter(s) IntraMuscular once  insulin lispro (ADMELOG) corrective regimen sliding scale   SubCutaneous every 6 hours  niCARdipine Infusion 5 mG/Hr IV Continuous  polyethylene glycol 3350 17 Gram(s) Oral two times a day  senna 2 Tablet(s) Oral at bedtime  sodium chloride 0.9%. 1000 milliLiter(s) IV Continuous     Covid: not detected     CBC:                        15.6   7.56  )-----------( 211      ( 07 Mar 2021 12:04 )             45.9       03-07    138  |  101  |  15  ----------------------------<  249<H>  3.2<L>   |  23  |  0.79    Ca    9.5      07 Mar 2021 21:16  Phos  2.9     03-07  Mg     1.9     03-07    TPro  7.7  /  Alb  4.5  /  TBili  0.5  /  DBili  x   /  AST  18  /  ALT  26  /  AlkPhos  99  03-07      Blood Bank: 0 positive available     Assessment/Plan:   This is a 56 year old right hand dominant male with a right thalamic hemorrhage, who presents to Neuro IR for a selective cerebral angiography to determine a source for the hemorrhage. Procedure, goals, risks, benefits and alternatives were discussed with patient All questions were answered. Risks include but are not limited to stroke, vessel injury, hemorrhage, and or right groin hematoma.  Patient demonstrates understanding of all risks involved with this procedure and wishes to continue. Appropriate content was obtained from patient and consent is in the patient's chart. Interventional Neuro Radiology  Pre-Procedure Note PA-DILMA    This is a 56 year old right hand dominant male who presented with complaints of headache, imbalance, since yesterday, CT head with right thalamic hemorrhage with IVH, ICH score 2. Patient was transported to Neuro IR for a selective cerebral angiography to determine a source for hemorrhage.     Upon exam patient is A + O x 3, speech is fluent, recent and remote memory intact, follows commands, PERRL, EOMI, tongue is midline, mild left pronator drift, moves all extremities.     Allergies: No Known Allergies  PMHX: hypertension, CAD  PSHX: Cardiac Stent 2019  Social History: , tobacco smoker 1/2 x 30 years, +ETOH beer   FAMILY HISTORY: non-contributory     Current Medications: acetaminophen   Tablet 650 milliGRAM(s) Oral every 6 hours PRN  chlorhexidine 4% Liquid 1 Application(s) Topical   influenza   Vaccine 0.5 milliLiter(s) IntraMuscular once  insulin lispro (ADMELOG) corrective regimen sliding scale   SubCutaneous every 6 hours  niCARdipine Infusion 5 mG/Hr IV Continuous  polyethylene glycol 3350 17 Gram(s) Oral two times a day  senna 2 Tablet(s) Oral at bedtime  sodium chloride 0.9%. 1000 milliLiter(s) IV Continuous     Covid: not detected     CBC:                        15.6   7.56  )-----------( 211      ( 07 Mar 2021 12:04 )             45.9       03-07    138  |  101  |  15  ----------------------------<  249<H>  3.2<L>   |  23  |  0.79    Ca    9.5      07 Mar 2021 21:16  Phos  2.9     03-07  Mg     1.9     03-07    TPro  7.7  /  Alb  4.5  /  TBili  0.5  /  DBili  x   /  AST  18  /  ALT  26  /  AlkPhos  99  03-07      Blood Bank: 0 positive available     Assessment/Plan:   This is a 56 year old right hand dominant male with a right thalamic hemorrhage, who presents to Neuro IR for a selective cerebral angiography to determine a source for the hemorrhage. Procedure, goals, risks, benefits and alternatives were discussed with patient All questions were answered. Risks include but are not limited to stroke, vessel injury, hemorrhage, and or right groin hematoma.  Patient demonstrates understanding of all risks involved with this procedure and wishes to continue. Appropriate content was obtained from patient and consent is in the patient's chart.

## 2021-03-09 DIAGNOSIS — I61.9 NONTRAUMATIC INTRACEREBRAL HEMORRHAGE, UNSPECIFIED: ICD-10-CM

## 2021-03-09 DIAGNOSIS — E11.9 TYPE 2 DIABETES MELLITUS WITHOUT COMPLICATIONS: ICD-10-CM

## 2021-03-09 DIAGNOSIS — I25.10 ATHEROSCLEROTIC HEART DISEASE OF NATIVE CORONARY ARTERY WITHOUT ANGINA PECTORIS: ICD-10-CM

## 2021-03-09 LAB
GLUCOSE BLDC GLUCOMTR-MCNC: 204 MG/DL — HIGH (ref 70–99)
GLUCOSE BLDC GLUCOMTR-MCNC: 210 MG/DL — HIGH (ref 70–99)
GLUCOSE BLDC GLUCOMTR-MCNC: 215 MG/DL — HIGH (ref 70–99)
GLUCOSE BLDC GLUCOMTR-MCNC: 244 MG/DL — HIGH (ref 70–99)

## 2021-03-09 PROCEDURE — 70450 CT HEAD/BRAIN W/O DYE: CPT | Mod: 26

## 2021-03-09 PROCEDURE — 99233 SBSQ HOSP IP/OBS HIGH 50: CPT

## 2021-03-09 RX ORDER — POTASSIUM CHLORIDE 20 MEQ
40 PACKET (EA) ORAL ONCE
Refills: 0 | Status: COMPLETED | OUTPATIENT
Start: 2021-03-09 | End: 2021-03-09

## 2021-03-09 RX ORDER — DEXTROSE 50 % IN WATER 50 %
15 SYRINGE (ML) INTRAVENOUS ONCE
Refills: 0 | Status: DISCONTINUED | OUTPATIENT
Start: 2021-03-09 | End: 2021-03-13

## 2021-03-09 RX ORDER — POTASSIUM PHOSPHATE, MONOBASIC POTASSIUM PHOSPHATE, DIBASIC 236; 224 MG/ML; MG/ML
15 INJECTION, SOLUTION INTRAVENOUS ONCE
Refills: 0 | Status: COMPLETED | OUTPATIENT
Start: 2021-03-09 | End: 2021-03-09

## 2021-03-09 RX ORDER — DEXTROSE 50 % IN WATER 50 %
12.5 SYRINGE (ML) INTRAVENOUS ONCE
Refills: 0 | Status: DISCONTINUED | OUTPATIENT
Start: 2021-03-09 | End: 2021-03-13

## 2021-03-09 RX ORDER — DEXTROSE 50 % IN WATER 50 %
25 SYRINGE (ML) INTRAVENOUS ONCE
Refills: 0 | Status: DISCONTINUED | OUTPATIENT
Start: 2021-03-09 | End: 2021-03-13

## 2021-03-09 RX ORDER — ENOXAPARIN SODIUM 100 MG/ML
40 INJECTION SUBCUTANEOUS
Refills: 0 | Status: DISCONTINUED | OUTPATIENT
Start: 2021-03-09 | End: 2021-03-13

## 2021-03-09 RX ORDER — HYDROCHLOROTHIAZIDE 25 MG
25 TABLET ORAL DAILY
Refills: 0 | Status: DISCONTINUED | OUTPATIENT
Start: 2021-03-09 | End: 2021-03-12

## 2021-03-09 RX ORDER — INSULIN LISPRO 100/ML
5 VIAL (ML) SUBCUTANEOUS
Refills: 0 | Status: DISCONTINUED | OUTPATIENT
Start: 2021-03-09 | End: 2021-03-13

## 2021-03-09 RX ORDER — ATORVASTATIN CALCIUM 80 MG/1
80 TABLET, FILM COATED ORAL AT BEDTIME
Refills: 0 | Status: DISCONTINUED | OUTPATIENT
Start: 2021-03-09 | End: 2021-03-13

## 2021-03-09 RX ORDER — NIFEDIPINE 30 MG
90 TABLET, EXTENDED RELEASE 24 HR ORAL DAILY
Refills: 0 | Status: DISCONTINUED | OUTPATIENT
Start: 2021-03-09 | End: 2021-03-13

## 2021-03-09 RX ORDER — GLUCAGON INJECTION, SOLUTION 0.5 MG/.1ML
1 INJECTION, SOLUTION SUBCUTANEOUS ONCE
Refills: 0 | Status: DISCONTINUED | OUTPATIENT
Start: 2021-03-09 | End: 2021-03-13

## 2021-03-09 RX ADMIN — POLYETHYLENE GLYCOL 3350 17 GRAM(S): 17 POWDER, FOR SOLUTION ORAL at 05:34

## 2021-03-09 RX ADMIN — INSULIN GLARGINE 8 UNIT(S): 100 INJECTION, SOLUTION SUBCUTANEOUS at 21:52

## 2021-03-09 RX ADMIN — Medication 100 MILLIGRAM(S): at 16:16

## 2021-03-09 RX ADMIN — Medication 90 MILLIGRAM(S): at 08:23

## 2021-03-09 RX ADMIN — OXYCODONE HYDROCHLORIDE 10 MILLIGRAM(S): 5 TABLET ORAL at 17:42

## 2021-03-09 RX ADMIN — ATORVASTATIN CALCIUM 80 MILLIGRAM(S): 80 TABLET, FILM COATED ORAL at 21:52

## 2021-03-09 RX ADMIN — OXYCODONE HYDROCHLORIDE 10 MILLIGRAM(S): 5 TABLET ORAL at 17:12

## 2021-03-09 RX ADMIN — SENNA PLUS 2 TABLET(S): 8.6 TABLET ORAL at 21:51

## 2021-03-09 RX ADMIN — Medication 1 PATCH: at 16:48

## 2021-03-09 RX ADMIN — Medication 4: at 16:58

## 2021-03-09 RX ADMIN — Medication 4: at 21:52

## 2021-03-09 RX ADMIN — Medication 40 MILLIEQUIVALENT(S): at 03:20

## 2021-03-09 RX ADMIN — Medication 25 MILLIGRAM(S): at 12:48

## 2021-03-09 RX ADMIN — Medication 5 UNIT(S): at 16:59

## 2021-03-09 RX ADMIN — Medication 4: at 08:45

## 2021-03-09 RX ADMIN — Medication 3 UNIT(S): at 08:43

## 2021-03-09 RX ADMIN — Medication 1 PATCH: at 07:26

## 2021-03-09 RX ADMIN — POTASSIUM PHOSPHATE, MONOBASIC POTASSIUM PHOSPHATE, DIBASIC 62.5 MILLIMOLE(S): 236; 224 INJECTION, SOLUTION INTRAVENOUS at 03:20

## 2021-03-09 RX ADMIN — Medication 1 MILLIGRAM(S): at 12:48

## 2021-03-09 RX ADMIN — LOSARTAN POTASSIUM 100 MILLIGRAM(S): 100 TABLET, FILM COATED ORAL at 06:12

## 2021-03-09 RX ADMIN — ENOXAPARIN SODIUM 40 MILLIGRAM(S): 100 INJECTION SUBCUTANEOUS at 17:02

## 2021-03-09 RX ADMIN — Medication 650 MILLIGRAM(S): at 18:59

## 2021-03-09 RX ADMIN — Medication 5 UNIT(S): at 12:49

## 2021-03-09 RX ADMIN — Medication 100 MILLIGRAM(S): at 05:34

## 2021-03-09 RX ADMIN — Medication 4: at 12:49

## 2021-03-09 RX ADMIN — NICARDIPINE HYDROCHLORIDE 25 MG/HR: 30 CAPSULE, EXTENDED RELEASE ORAL at 12:19

## 2021-03-09 RX ADMIN — Medication 1 PATCH: at 16:16

## 2021-03-09 RX ADMIN — Medication 650 MILLIGRAM(S): at 19:30

## 2021-03-09 RX ADMIN — Medication 1 PATCH: at 21:53

## 2021-03-09 RX ADMIN — Medication 1 TABLET(S): at 16:16

## 2021-03-09 NOTE — CONSULT NOTE ADULT - SUBJECTIVE AND OBJECTIVE BOX
CHIEF COMPLAINT:    HISTORY OF PRESENT ILLNESS:  57 y/o M presents with headache and imbalance, CTH showed R BG/thalamic hemorrhage and IVH likely hypertensive in etiology. Conventional angio ruled out vascular malformation. Exam remains stable, mild drift of LUE.    PAST MEDICAL & SURGICAL HISTORY:  CAD (coronary artery disease)  SP Stent 2019    HTN (hypertension)    DM (diabetes mellitus)    HLD (hyperlipidemia)    HTN (hypertension)    No significant past surgical history    No significant past surgical history            MEDICATIONS:  enoxaparin Injectable 40 milliGRAM(s) SubCutaneous <User Schedule>  losartan 100 milliGRAM(s) Oral daily  niCARdipine Infusion 5 mG/Hr IV Continuous <Continuous>  NIFEdipine XL 90 milliGRAM(s) Oral daily        acetaminophen   Tablet .. 650 milliGRAM(s) Oral every 6 hours PRN  oxyCODONE    IR 5 milliGRAM(s) Oral every 4 hours PRN  oxyCODONE    IR 10 milliGRAM(s) Oral every 4 hours PRN    polyethylene glycol 3350 17 Gram(s) Oral two times a day  senna 2 Tablet(s) Oral at bedtime    atorvastatin 80 milliGRAM(s) Oral at bedtime  insulin glargine Injectable (LANTUS) 8 Unit(s) SubCutaneous at bedtime  insulin lispro (ADMELOG) corrective regimen sliding scale   SubCutaneous Before meals and at bedtime  insulin lispro Injectable (ADMELOG) 5 Unit(s) SubCutaneous three times a day before meals    folic acid 1 milliGRAM(s) Oral daily  influenza   Vaccine 0.5 milliLiter(s) IntraMuscular once  multivitamin 1 Tablet(s) Oral daily  thiamine 100 milliGRAM(s) Oral daily      FAMILY HISTORY:  Family history of sudden cardiac death in brother (Sibling)        SOCIAL HISTORY:    [ ] Non-smoker  [ ] Smoker  [ ] Alcohol    Allergies    No Known Allergies    Intolerances    	    REVIEW OF SYSTEMS:  CONSTITUTIONAL: No fever, weight loss, fatigue  EYES: No eye pain, visual disturbances, or discharge  ENMT:  No difficulty hearing, tinnitus, vertigo; No sinus or throat pain  NECK: No pain or stiffness  RESPIRATORY: No cough, wheezing, chills or hemoptysis; No Shortness of Breath  CARDIOVASCULAR: No chest pain, palpitations, passing out, dizziness, or leg swelling  GASTROINTESTINAL: No abdominal or epigastric pain. No nausea, vomiting, or hematemesis; No diarrhea or constipation. No melena or hematochezia.  GENITOURINARY: No dysuria, frequency, hematuria, or incontinence  NEUROLOGICAL: No headaches, memory loss, loss of strength, numbness, or tremors  SKIN: No itching, burning, rashes, or lesions   LYMPH Nodes: No enlarged glands  ENDOCRINE: No heat or cold intolerance; No hair loss  MUSCULOSKELETAL: No joint pain or swelling; No muscle, back, or extremity pain  PSYCHIATRIC: No depression, anxiety, mood swings, or difficulty sleeping  HEME/LYMPH: No easy bruising, or bleeding gums  ALLERY AND IMMUNOLOGIC: No hives or eczema	    [ ] All others negative	  [ ] Unable to obtain    PHYSICAL EXAM:  T(C): 36.8 (03-09-21 @ 11:00), Max: 36.8 (03-09-21 @ 03:00)  HR: 89 (03-09-21 @ 10:30) (63 - 101)  BP: --  RR: 16 (03-09-21 @ 11:15) (10 - 31)  SpO2: 97% (03-09-21 @ 11:15) (92% - 100%)  Wt(kg): --  I&O's Summary    08 Mar 2021 07:01  -  09 Mar 2021 07:00  --------------------------------------------------------  IN: 3475 mL / OUT: 2750 mL / NET: 725 mL    09 Mar 2021 07:01  -  09 Mar 2021 11:27  --------------------------------------------------------  IN: 440 mL / OUT: 0 mL / NET: 440 mL        Appearance: NAD  HEENT:   Dry oral mucosa, PERRL, EOMI	  Lymphatic: No lymphadenopathy  Cardiovascular: Normal S1 S2, No JVD, No murmurs, No edema  Respiratory: Lungs clear to auscultation	  Psychiatry: A & O x 3, Mood & affect appropriate  Gastrointestinal:  Soft, Non-tender, + BS	  Skin: No rashes, No ecchymoses, No cyanosis	  NEURO EXAM:  Alert, oriented, speech fluent, no dysarthria, follows commands, EOMI, no facial asymmetry, mild LUE drift, otherwise 5/5 strength throughout, sensation symmetric to light touch, no dysmetria  Extremities: Normal range of motion, No clubbing, cyanosis or edema  Vascular: Peripheral pulses palpable 2+ bilaterally    TELEMETRY: SR 	    ECG: Sinus tach, LVH , anteroseptal infarct   	  RADIOLOGY:  < from: CT Head No Cont (03.09.21 @ 08:24) >    EXAM:  CT BRAIN                            PROCEDURE DATE:  03/09/2021            INTERPRETATION:  CLINICAL INFORMATION: Follow up on right thalamic hemorrhage.    TECHNIQUE:  Noncontrast CT of the head was performed.    COMPARISON STUDY: CT head from 3/7/2021.    FINDINGS:    PARENCHYMA: Previously seen area of right thalamic hemorrhage, which measured 1.8 x 3.1 now measures 1.6 x 2.8 cm. Redemonstrated lacunar infarcts involving the left thalamus and bilateral basal ganglia. Age appropriate involutional changes and small vessel white matter ischemic type changes.  VENTRICLES: Grossly stable intraventricular hemorrhage.  EXTRA-AXIAL: No abnormal extraaxial collection.  PARANASAL SINUSES: Small left maxillary sinus mucous retention cyst.  TYMPANOMASTOID CAVITIES: Within normal limits.  ORBITS: Within normal limits.  BONES: Within normal limits.    IMPRESSION:  Slightly decreased size of right thalamic hemorrhage. Grossly stable intraventricular hemorrhage.    < end of copied text >  < from: CT Head No Cont (03.07.21 @ 18:08) >    EXAM:  CT BRAIN                            PROCEDURE DATE:  03/07/2021            INTERPRETATION:  Clinical indication: Follow-up hemorrhage    Multiple axial sections were performed from base skull to vertex without contrast enhancement. Coronal and sagittal reconstructions were performed as well    This exam is compared prior noncontrast head CT performed earlier the same day at 12:01 PM    Acute parenchymal hemorrhage involving the right thalamic region is again identified. This finding measures approximately 1.8 x 3.1 cm and previously measured approximately 2.0 x 3.4 cm. Intraventricular hemorrhage is again identified. The size and configuration of the ventricles appear unchanged.    No significant shift or herniation is seen.    Evaluation of osseous structures with the appropriate window appears unremarkable    The visualized paranasal sinuses mastoid and middle ear regions appear clear.    IMPRESSION: Acute hemorrhage involving the right thalamic region is again seen with intraventricular hemorrhage.      < end of copied text >  < from: CT Angio Neck w/ IV Cont (03.07.21 @ 12:14) >    EXAM:  CT ANGIO BRAIN (W)AW IC                          EXAM:  CT ANGIO NECK (W)AW IC                            PROCEDURE DATE:  03/07/2021            INTERPRETATION:  INDICATIONS: Code stroke.    TECHNIQUE: A CT scan of the head with and without intravenous contrast and a CT angiogram study of the neck and head were performed. The head CT was performed with 5 mm axial images. The CT angiogram study was performed with axial thin section images with 2-D and 3-D reformatted series. Rapid angio sequence was performed. 70 cc intravenous Omnipaque 350 contrast was administered.    COMPARISON:  Noncontrast head CT from same day, 3/7/2021 at 12 1:00 PM.    FINDINGS:  Right thalamic hemorrhage with intraventricular extension again noted. Please see concurrent CT head report.    Neck CTA:    The visualized aorta and great vessels are unremarkable. The common carotid arteries are normal in appearance.  The internal carotid arteries and external carotid arteries are patent. Minimal calcified plaques involving the internal carotids.  The vertebral arteries are patent.    Brain CTA:    Atherosclerotic changes involving the bilateral carotid siphons with mild stenosis on the right. The [place] is without stenosis.  The visualized cerebral arteries are unremarkable.  Hypoplastic vertebrobasilar system. Multiple moderate stenosis of the left intracranial vertebral artery noted.    No aneurysm or vascular malformation identified. No abnormal vasculature in the region of the right thalamic hemorrhage.    CT perfusion brain:      Miscellaneous: Visualized lungs are clear.    IMPRESSION:  CTA neck:  -No vaso-occlusive disease.    CTA head:  -Mild stenosis of the right intracranial ICA.  -Multiple moderate stenoses of the left intracranial vertebral artery.        < end of copied text >    OTHER: 	  	  LABS:	 	    CARDIAC MARKERS:                                  14.1   8.60  )-----------( 191      ( 08 Mar 2021 20:33 )             41.6     03-08    138  |  103  |  15  ----------------------------<  183<H>  3.4<L>   |  22  |  0.66    Ca    9.3      08 Mar 2021 20:33  Phos  2.7     03-08  Mg     2.1     03-08    TPro  7.7  /  Alb  4.5  /  TBili  0.5  /  DBili  x   /  AST  18  /  ALT  26  /  AlkPhos  99  03-07    proBNP:   Lipid Profile:   HgA1c:   TSH:

## 2021-03-09 NOTE — CONSULT NOTE ADULT - ASSESSMENT
57 y/o M presents with headache and imbalance, CTH showed R BG/thalamic hemorrhage and IVH likely hypertensive in etiology. Conventional angio ruled out vascular malformation. Exam remains stable, mild drift of LUE.
56M here w/ headache, imbalance, fall from 1400 3/6. As per patient, had posterior distribution headache yesterday, moderate intensity. PE unremarkable, CT head w/ R BG/thalamus hemorrhage likely 2/2 HTN. Would recommend BP control, neurosurgical consult for additional management and care.     Recs:  [] Strict BP control goal <160/90 w/ Cardene drip  [] Repeat CTH in 24hrs or prn for worsening mental status or neuro exam  [] q2 neuro checks, vitals  [] hold ASA, lovenox, use mechanical DVT prophylaxis  [] Telemetry Monitoring  [] MRI brain w/ and w/o cont to look for underlying lesions, malformations. MRA brain w/o contast MRA neck w/con  [] HgA1c, Lipid profile

## 2021-03-09 NOTE — PROGRESS NOTE ADULT - SUBJECTIVE AND OBJECTIVE BOX
Neurology Progress Note    Interval History - No events overnight    Subjective:  Pt was seen and examined at bedside during rounds this morning. Pt had no complaints.    Objective:   Vital Signs Last 24 Hrs  T(C): 36.6 (09 Mar 2021 07:00), Max: 36.8 (09 Mar 2021 03:00)  T(F): 97.9 (09 Mar 2021 07:00), Max: 98.2 (09 Mar 2021 03:00)  HR: 73 (09 Mar 2021 10:00) (63 - 101)  BP: --  BP(mean): --  RR: 16 (09 Mar 2021 10:00) (10 - 31)  SpO2: 98% (09 Mar 2021 10:00) (92% - 100%)    General Exam:   PHYSICAL EXAM:  GENERAL: No acute distress  HEENT: Normocephalic; conjunctivae and sclerae clear; moist mucous membranes;   EXTREMITIES: No cyanosis; no edema; no calf tenderness  SKIN: Warm and dry; no rash    NEURO EXAM:  Alert, oriented, speech fluent, no dysarthria, follows commands, EOMI, no facial asymmetry, mild LUE drift, otherwise 5/5 strength throughout, sensation symmetric to light touch, no dysmetria    Other:    03-08    138  |  103  |  15  ----------------------------<  183<H>  3.4<L>   |  22  |  0.66    Ca    9.3      08 Mar 2021 20:33  Phos  2.7     03-08  Mg     2.1     03-08    TPro  7.7  /  Alb  4.5  /  TBili  0.5  /  DBili  x   /  AST  18  /  ALT  26  /  AlkPhos  99  03-07    03-08    138  |  103  |  15  ----------------------------<  183<H>  3.4<L>   |  22  |  0.66    Ca    9.3      08 Mar 2021 20:33  Phos  2.7     03-08  Mg     2.1     03-08    TPro  7.7  /  Alb  4.5  /  TBili  0.5  /  DBili  x   /  AST  18  /  ALT  26  /  AlkPhos  99  03-07    LIVER FUNCTIONS - ( 07 Mar 2021 12:04 )  Alb: 4.5 g/dL / Pro: 7.7 g/dL / ALK PHOS: 99 U/L / ALT: 26 U/L / AST: 18 U/L / GGT: x                                 14.1   8.60  )-----------( 191      ( 08 Mar 2021 20:33 )             41.6     Radiology  IR Neuro (03.08.21 @ 10:53)   Impression: Diagnostic cerebral angiogram demonstrating diffuse mild intracranial atherosclerotic disease without significant stenosis, and no evidence of aneurysm, arteriovenous malformation or fistula.    CT Head No Cont (03.09.21 @ 08:24) >  IMPRESSION:  Slightly decreased size of right thalamic hemorrhage. Grossly stable intraventricular hemorrhage.    CT Head No Cont (03.07.21 @ 18:08)  IMPRESSION: Acute hemorrhage involving the right thalamic region is again seen with intraventricular hemorrhage.    MEDICATIONS  (STANDING):  atorvastatin 80 milliGRAM(s) Oral at bedtime  enoxaparin Injectable 40 milliGRAM(s) SubCutaneous <User Schedule>  folic acid 1 milliGRAM(s) Oral daily  influenza   Vaccine 0.5 milliLiter(s) IntraMuscular once  insulin glargine Injectable (LANTUS) 8 Unit(s) SubCutaneous at bedtime  insulin lispro (ADMELOG) corrective regimen sliding scale   SubCutaneous Before meals and at bedtime  insulin lispro Injectable (ADMELOG) 5 Unit(s) SubCutaneous three times a day before meals  losartan 100 milliGRAM(s) Oral daily  multivitamin 1 Tablet(s) Oral daily  niCARdipine Infusion 5 mG/Hr (25 mL/Hr) IV Continuous <Continuous>  nicotine -  14 mG/24Hr(s) Patch 1 patch Transdermal daily  NIFEdipine XL 90 milliGRAM(s) Oral daily  polyethylene glycol 3350 17 Gram(s) Oral two times a day  senna 2 Tablet(s) Oral at bedtime  thiamine 100 milliGRAM(s) Oral daily    MEDICATIONS  (PRN):  acetaminophen   Tablet .. 650 milliGRAM(s) Oral every 6 hours PRN Temp greater or equal to 38C (100.4F), Mild Pain (1 - 3)  oxyCODONE    IR 5 milliGRAM(s) Oral every 4 hours PRN Moderate Pain (4 - 6)  oxyCODONE    IR 10 milliGRAM(s) Oral every 4 hours PRN Severe Pain (7 - 10)            Neurology Progress Note    Interval History - No events overnight    Subjective:  Pt was seen and examined at bedside during rounds this morning in NSCU. Pt had no complaints.    Objective:   Vital Signs Last 24 Hrs  T(C): 36.6 (09 Mar 2021 07:00), Max: 36.8 (09 Mar 2021 03:00)  T(F): 97.9 (09 Mar 2021 07:00), Max: 98.2 (09 Mar 2021 03:00)  HR: 73 (09 Mar 2021 10:00) (63 - 101)  BP: --  BP(mean): --  RR: 16 (09 Mar 2021 10:00) (10 - 31)  SpO2: 98% (09 Mar 2021 10:00) (92% - 100%)    General Exam:   PHYSICAL EXAM:  GENERAL: No acute distress  HEENT: Normocephalic; conjunctivae and sclerae clear; moist mucous membranes;   EXTREMITIES: No cyanosis; no edema; no calf tenderness  SKIN: Warm and dry; no rash    NEURO EXAM:  Alert, oriented, speech fluent, no dysarthria, follows commands, EOMI, no facial asymmetry, mild LUE drift, otherwise 5/5 strength throughout, sensation symmetric to light touch, no dysmetria    Other:    03-08    138  |  103  |  15  ----------------------------<  183<H>  3.4<L>   |  22  |  0.66    Ca    9.3      08 Mar 2021 20:33  Phos  2.7     03-08  Mg     2.1     03-08    TPro  7.7  /  Alb  4.5  /  TBili  0.5  /  DBili  x   /  AST  18  /  ALT  26  /  AlkPhos  99  03-07    03-08    138  |  103  |  15  ----------------------------<  183<H>  3.4<L>   |  22  |  0.66    Ca    9.3      08 Mar 2021 20:33  Phos  2.7     03-08  Mg     2.1     03-08    TPro  7.7  /  Alb  4.5  /  TBili  0.5  /  DBili  x   /  AST  18  /  ALT  26  /  AlkPhos  99  03-07    LIVER FUNCTIONS - ( 07 Mar 2021 12:04 )  Alb: 4.5 g/dL / Pro: 7.7 g/dL / ALK PHOS: 99 U/L / ALT: 26 U/L / AST: 18 U/L / GGT: x                                 14.1   8.60  )-----------( 191      ( 08 Mar 2021 20:33 )             41.6     Radiology  IR Neuro (03.08.21 @ 10:53)   Impression: Diagnostic cerebral angiogram demonstrating diffuse mild intracranial atherosclerotic disease without significant stenosis, and no evidence of aneurysm, arteriovenous malformation or fistula.    CT Head No Cont (03.09.21 @ 08:24) >  IMPRESSION:  Slightly decreased size of right thalamic hemorrhage. Grossly stable intraventricular hemorrhage.    CT Head No Cont (03.07.21 @ 18:08)  IMPRESSION: Acute hemorrhage involving the right thalamic region is again seen with intraventricular hemorrhage.    MEDICATIONS  (STANDING):  atorvastatin 80 milliGRAM(s) Oral at bedtime  enoxaparin Injectable 40 milliGRAM(s) SubCutaneous <User Schedule>  folic acid 1 milliGRAM(s) Oral daily  influenza   Vaccine 0.5 milliLiter(s) IntraMuscular once  insulin glargine Injectable (LANTUS) 8 Unit(s) SubCutaneous at bedtime  insulin lispro (ADMELOG) corrective regimen sliding scale   SubCutaneous Before meals and at bedtime  insulin lispro Injectable (ADMELOG) 5 Unit(s) SubCutaneous three times a day before meals  losartan 100 milliGRAM(s) Oral daily  multivitamin 1 Tablet(s) Oral daily  niCARdipine Infusion 5 mG/Hr (25 mL/Hr) IV Continuous <Continuous>  nicotine -  14 mG/24Hr(s) Patch 1 patch Transdermal daily  NIFEdipine XL 90 milliGRAM(s) Oral daily  polyethylene glycol 3350 17 Gram(s) Oral two times a day  senna 2 Tablet(s) Oral at bedtime  thiamine 100 milliGRAM(s) Oral daily    MEDICATIONS  (PRN):  acetaminophen   Tablet .. 650 milliGRAM(s) Oral every 6 hours PRN Temp greater or equal to 38C (100.4F), Mild Pain (1 - 3)  oxyCODONE    IR 5 milliGRAM(s) Oral every 4 hours PRN Moderate Pain (4 - 6)  oxyCODONE    IR 10 milliGRAM(s) Oral every 4 hours PRN Severe Pain (7 - 10)

## 2021-03-09 NOTE — CONSULT NOTE ADULT - ATTENDING COMMENTS
Advanced care planning was discussed with patient and family.  Advanced care planning forms were reviewed and discussed as appropriate.  Differential diagnosis and plan of care discussed with patient after the evaluation.   Pain assessed and judicious use of narcotics when appropriate was discussed.  Importance of Fall prevention discussed.  Counseling on Smoking and Alcohol cessation was offered when appropriate.  Counseling on Diet, exercise, and medication compliance was done.
56Mw/ headache, imbalance, fall from 1400 .CT head w/ R BG/thalamus hemorrhage and IVH likely 2/2 HTN  - NSCU  - angio  - SBP <160  - hold AC/AP  Sandro Moura MD  Vascular Neurology

## 2021-03-09 NOTE — PROGRESS NOTE ADULT - SUBJECTIVE AND OBJECTIVE BOX
SUMMARY: 55yo man presenting to ED with 1day history of headache and imbalance. CTH showing R thalamic hemorrhage with intraventricular extension. PMH HTN, hyperglycemic on admission, but does not take any medications at home.     OVERNIGHT EVENTS: Adm NSCU    ADMISSION SCORES:   GCS: 15 HH: MF: NIHSS: ICH Score: 1    REVIEW OF SYSTEMS: [] Unable to Assess due to neurologic exam   [ x] All ROS addressed below are non-contributory, except:  Neuro: [ x] Headache [ ] Back pain [ ] Numbness [ ] Weakness [ ] Ataxia [ ] Dizziness [ ] Aphasia [ ] Dysarthria [ ] Visual disturbance  Resp: [ ] Shortness of breath/dyspnea [ ] Orthopnea [ ] Cough  CV: [ ] Chest pain [ ] Palpitation [ ] Lightheadedness [ ] Syncope  Renal: [ ] Thirst [ ] Edema  GI: [ ] Nausea [ ] Emesis [ ] Abdominal pain [ ] Constipation [ ] Diarrhea  Hem: [ ] Hematemesis [ ] bBright red blood per rectum  ID: [ ] Fever [ ] Chills [ ] Dysuria  ENT: [ ] Rhinorrhea    VITALS: [x] Reviewed    IMAGING/DATA: [x] Reviewed    IVF FLUIDS/MEDICATIONS: [x] Reviewed    ALLERGIES: Allergies    No Known Allergies    Intolerances            ICU Vital Signs Last 24 Hrs  T(C): 36.8 (09 Mar 2021 03:00), Max: 36.8 (09 Mar 2021 03:00)  T(F): 98.2 (09 Mar 2021 03:00), Max: 98.2 (09 Mar 2021 03:00)  HR: 72 (09 Mar 2021 05:30) (63 - 101)  BP: --  BP(mean): --  ABP: 157/74 (09 Mar 2021 05:30) (133/60 - 176/84)  ABP(mean): 101 (09 Mar 2021 05:30) (82 - 113)  RR: 16 (09 Mar 2021 05:30) (10 - 31)  SpO2: 95% (09 Mar 2021 05:30) (92% - 100%)      03-07-21 @ 07:01  -  03-08-21 @ 07:00  --------------------------------------------------------  IN: 2110 mL / OUT: 1800 mL / NET: 310 mL    03-08-21 @ 07:01  -  03-09-21 @ 06:26  --------------------------------------------------------  IN: 3330 mL / OUT: 2275 mL / NET: 1055 mL        acetaminophen   Tablet .. 650 milliGRAM(s) Oral every 6 hours PRN  atorvastatin 40 milliGRAM(s) Oral at bedtime  chlorhexidine 4% Liquid 1 Application(s) Topical <User Schedule>  folic acid 1 milliGRAM(s) Oral daily  influenza   Vaccine 0.5 milliLiter(s) IntraMuscular once  insulin glargine Injectable (LANTUS) 8 Unit(s) SubCutaneous at bedtime  insulin lispro (ADMELOG) corrective regimen sliding scale   SubCutaneous Before meals and at bedtime  insulin lispro Injectable (ADMELOG) 3 Unit(s) SubCutaneous three times a day before meals  labetalol 100 milliGRAM(s) Oral every 8 hours  losartan 100 milliGRAM(s) Oral daily  multivitamin 1 Tablet(s) Oral daily  niCARdipine Infusion 5 mG/Hr (25 mL/Hr) IV Continuous <Continuous>  nicotine -  14 mG/24Hr(s) Patch 1 patch Transdermal daily  oxyCODONE    IR 5 milliGRAM(s) Oral every 4 hours PRN  oxyCODONE    IR 10 milliGRAM(s) Oral every 4 hours PRN  polyethylene glycol 3350 17 Gram(s) Oral two times a day  senna 2 Tablet(s) Oral at bedtime  thiamine 100 milliGRAM(s) Oral daily                            14.1   8.60  )-----------( 191      ( 08 Mar 2021 20:33 )             41.6     03-08    138  |  103  |  15  ----------------------------<  183<H>  3.4<L>   |  22  |  0.66    Ca    9.3      08 Mar 2021 20:33  Phos  2.7     03-08  Mg     2.1     03-08    TPro  7.7  /  Alb  4.5  /  TBili  0.5  /  DBili  x   /  AST  18  /  ALT  26  /  AlkPhos  99  03-07    LIVER FUNCTIONS - ( 07 Mar 2021 12:04 )  Alb: 4.5 g/dL / Pro: 7.7 g/dL / ALK PHOS: 99 U/L / ALT: 26 U/L / AST: 18 U/L / GGT: x             DEVICES:   [] Restraints [x] PIVs [] ET tube [] central line [] PICC [] arterial line [] healy [] NGT/OGT [] EVD [] LD [] JAYNE/HMV [] LiCOX [] ICP monitor [] Trach [] PEG [] Chest Tube [] other:    EXAMINATION:  General: No acute distress  HEENT: Anicteric sclerae  Cardiac: [id number]  Lungs: Clear  Abdomen: Soft, non-tender, +BS  Extremities: No c/c/e  Skin/Incision Site: Clean, dry and intact  Neurologic: Awake, alert, fully oriented, follows commands, PERRL, EOMI, face symmetric, tongue midline, mild LUE drift, full strength SUMMARY: 55yo man presenting to ED with 1day history of headache and imbalance. CTH showing R thalamic hemorrhage with intraventricular extension. PMH HTN, hyperglycemic on admission, but does not take any medications at home.     OVERNIGHT EVENTS: no acute events     ADMISSION SCORES:   GCS: 15 HH: MF: NIHSS: ICH Score: 1    REVIEW OF SYSTEMS: [] Unable to Assess due to neurologic exam   [ x] All ROS addressed below are non-contributory, except:  Neuro: [ x] Headache [ ] Back pain [ ] Numbness [ ] Weakness [ ] Ataxia [ ] Dizziness [ ] Aphasia [ ] Dysarthria [ ] Visual disturbance  Resp: [ ] Shortness of breath/dyspnea [ ] Orthopnea [ ] Cough  CV: [ ] Chest pain [ ] Palpitation [ ] Lightheadedness [ ] Syncope  Renal: [ ] Thirst [ ] Edema  GI: [ ] Nausea [ ] Emesis [ ] Abdominal pain [ ] Constipation [ ] Diarrhea  Hem: [ ] Hematemesis [ ] bBright red blood per rectum  ID: [ ] Fever [ ] Chills [ ] Dysuria  ENT: [ ] Rhinorrhea    VITALS: [x] Reviewed    IMAGING/DATA: [x] Reviewed    IVF FLUIDS/MEDICATIONS: [x] Reviewed    ALLERGIES: Allergies    No Known Allergies    Intolerances            ICU Vital Signs Last 24 Hrs  T(C): 36.6 (09 Mar 2021 07:00), Max: 36.8 (09 Mar 2021 03:00)  T(F): 97.9 (09 Mar 2021 07:00), Max: 98.2 (09 Mar 2021 03:00)  HR: 73 (09 Mar 2021 10:00) (63 - 101)  BP: --  BP(mean): --  ABP: 142/63 (09 Mar 2021 10:00) (133/60 - 176/84)  ABP(mean): 87 (09 Mar 2021 10:00) (82 - 113)  RR: 16 (09 Mar 2021 10:00) (10 - 31)  SpO2: 98% (09 Mar 2021 10:00) (92% - 100%)      03-08-21 @ 07:01  -  03-09-21 @ 07:00  --------------------------------------------------------  IN: 3475 mL / OUT: 2750 mL / NET: 725 mL    03-09-21 @ 07:01  -  03-09-21 @ 10:22  --------------------------------------------------------  IN: 390 mL / OUT: 0 mL / NET: 390 mL      acetaminophen   Tablet .. 650 milliGRAM(s) Oral every 6 hours PRN  atorvastatin 40 milliGRAM(s) Oral at bedtime  folic acid 1 milliGRAM(s) Oral daily  influenza   Vaccine 0.5 milliLiter(s) IntraMuscular once  insulin glargine Injectable (LANTUS) 8 Unit(s) SubCutaneous at bedtime  insulin lispro (ADMELOG) corrective regimen sliding scale   SubCutaneous Before meals and at bedtime  insulin lispro Injectable (ADMELOG) 3 Unit(s) SubCutaneous three times a day before meals  losartan 100 milliGRAM(s) Oral daily  multivitamin 1 Tablet(s) Oral daily  niCARdipine Infusion 5 mG/Hr (25 mL/Hr) IV Continuous <Continuous>  nicotine -  14 mG/24Hr(s) Patch 1 patch Transdermal daily  NIFEdipine XL 90 milliGRAM(s) Oral daily  oxyCODONE    IR 5 milliGRAM(s) Oral every 4 hours PRN  oxyCODONE    IR 10 milliGRAM(s) Oral every 4 hours PRN  polyethylene glycol 3350 17 Gram(s) Oral two times a day  senna 2 Tablet(s) Oral at bedtime  thiamine 100 milliGRAM(s) Oral daily      LABS:  Na: 138 (03-08 @ 20:33), 138 (03-07 @ 21:16), 135 (03-07 @ 12:04)  K: 3.4 (03-08 @ 20:33), 3.2 (03-07 @ 21:16), 4.1 (03-07 @ 12:04)  Cl: 103 (03-08 @ 20:33), 101 (03-07 @ 21:16), 98 (03-07 @ 12:04)  CO2: 22 (03-08 @ 20:33), 23 (03-07 @ 21:16), 21 (03-07 @ 12:04)  BUN: 15 (03-08 @ 20:33), 15 (03-07 @ 21:16), 13 (03-07 @ 12:04)  Cr: 0.66 (03-08 @ 20:33), 0.79 (03-07 @ 21:16), 0.62 (03-07 @ 12:04)  Glu: 183(03-08 @ 20:33), 249(03-07 @ 21:16), 334(03-07 @ 12:04)    Hgb: 14.1 (03-08 @ 20:33), 15.6 (03-07 @ 12:04)  Hct: 41.6 (03-08 @ 20:33), 45.9 (03-07 @ 12:04)  WBC: 8.60 (03-08 @ 20:33), 7.56 (03-07 @ 12:04)  Plt: 191 (03-08 @ 20:33), 211 (03-07 @ 12:04)    INR: 0.97 03-07-21 @ 12:04  PTT: 32.2 03-07-21 @ 12:04    LIVER FUNCTIONS - ( 07 Mar 2021 12:04 )  Alb: 4.5 g/dL / Pro: 7.7 g/dL / ALK PHOS: 99 U/L / ALT: 26 U/L / AST: 18 U/L / GGT: x             DEVICES:   [] Restraints [x] PIVs [] ET tube [] central line [] PICC [] arterial line [] healy [] NGT/OGT [] EVD [] LD [] JAYNE/HMV [] LiCOX [] ICP monitor [] Trach [] PEG [] Chest Tube [] other:    EXAMINATION:  General: No acute distress  HEENT: Anicteric sclerae  Cardiac: [id number]  Lungs: Clear  Abdomen: Soft, non-tender, +BS  Extremities: No c/c/e  Skin/Incision Site: Clean, dry and intact  Neurologic: Awake, alert, fully oriented, follows commands, PERRL, EOMI, face symmetric, tongue midline, mild LUE drift, full strength SUMMARY: 57yo man presenting to ED with 1day history of headache and imbalance. CTH showing R thalamic hemorrhage with intraventricular extension. PMH HTN, hyperglycemic on admission, but does not take any medications at home.     OVERNIGHT EVENTS: no acute events     ADMISSION SCORES:   GCS: 15 HH: MF: NIHSS: ICH Score: 1    REVIEW OF SYSTEMS: [] Unable to Assess due to neurologic exam   [ x] All ROS addressed below are non-contributory, except:  Neuro: [ x] Headache [ ] Back pain [ ] Numbness [ ] Weakness [ ] Ataxia [ ] Dizziness [ ] Aphasia [ ] Dysarthria [ ] Visual disturbance  Resp: [ ] Shortness of breath/dyspnea [ ] Orthopnea [ ] Cough  CV: [ ] Chest pain [ ] Palpitation [ ] Lightheadedness [ ] Syncope  Renal: [ ] Thirst [ ] Edema  GI: [ ] Nausea [ ] Emesis [ ] Abdominal pain [ ] Constipation [ ] Diarrhea  Hem: [ ] Hematemesis [ ] bBright red blood per rectum  ID: [ ] Fever [ ] Chills [ ] Dysuria  ENT: [ ] Rhinorrhea    VITALS: [x] Reviewed    IMAGING/DATA: [x] Reviewed    IVF FLUIDS/MEDICATIONS: [x] Reviewed    ALLERGIES: Allergies    No Known Allergies    Intolerances            ICU Vital Signs Last 24 Hrs  T(C): 36.6 (09 Mar 2021 07:00), Max: 36.8 (09 Mar 2021 03:00)  T(F): 97.9 (09 Mar 2021 07:00), Max: 98.2 (09 Mar 2021 03:00)  HR: 73 (09 Mar 2021 10:00) (63 - 101)  BP: --  BP(mean): --  ABP: 142/63 (09 Mar 2021 10:00) (133/60 - 176/84)  ABP(mean): 87 (09 Mar 2021 10:00) (82 - 113)  RR: 16 (09 Mar 2021 10:00) (10 - 31)  SpO2: 98% (09 Mar 2021 10:00) (92% - 100%)      03-08-21 @ 07:01  -  03-09-21 @ 07:00  --------------------------------------------------------  IN: 3475 mL / OUT: 2750 mL / NET: 725 mL    03-09-21 @ 07:01  -  03-09-21 @ 10:22  --------------------------------------------------------  IN: 390 mL / OUT: 0 mL / NET: 390 mL      acetaminophen   Tablet .. 650 milliGRAM(s) Oral every 6 hours PRN  atorvastatin 40 milliGRAM(s) Oral at bedtime  folic acid 1 milliGRAM(s) Oral daily  influenza   Vaccine 0.5 milliLiter(s) IntraMuscular once  insulin glargine Injectable (LANTUS) 8 Unit(s) SubCutaneous at bedtime  insulin lispro (ADMELOG) corrective regimen sliding scale   SubCutaneous Before meals and at bedtime  insulin lispro Injectable (ADMELOG) 3 Unit(s) SubCutaneous three times a day before meals  losartan 100 milliGRAM(s) Oral daily  multivitamin 1 Tablet(s) Oral daily  niCARdipine Infusion 5 mG/Hr (25 mL/Hr) IV Continuous <Continuous>  nicotine -  14 mG/24Hr(s) Patch 1 patch Transdermal daily  NIFEdipine XL 90 milliGRAM(s) Oral daily  oxyCODONE    IR 5 milliGRAM(s) Oral every 4 hours PRN  oxyCODONE    IR 10 milliGRAM(s) Oral every 4 hours PRN  polyethylene glycol 3350 17 Gram(s) Oral two times a day  senna 2 Tablet(s) Oral at bedtime  thiamine 100 milliGRAM(s) Oral daily      LABS:  Na: 138 (03-08 @ 20:33), 138 (03-07 @ 21:16), 135 (03-07 @ 12:04)  K: 3.4 (03-08 @ 20:33), 3.2 (03-07 @ 21:16), 4.1 (03-07 @ 12:04)  Cl: 103 (03-08 @ 20:33), 101 (03-07 @ 21:16), 98 (03-07 @ 12:04)  CO2: 22 (03-08 @ 20:33), 23 (03-07 @ 21:16), 21 (03-07 @ 12:04)  BUN: 15 (03-08 @ 20:33), 15 (03-07 @ 21:16), 13 (03-07 @ 12:04)  Cr: 0.66 (03-08 @ 20:33), 0.79 (03-07 @ 21:16), 0.62 (03-07 @ 12:04)  Glu: 183(03-08 @ 20:33), 249(03-07 @ 21:16), 334(03-07 @ 12:04)    Hgb: 14.1 (03-08 @ 20:33), 15.6 (03-07 @ 12:04)  Hct: 41.6 (03-08 @ 20:33), 45.9 (03-07 @ 12:04)  WBC: 8.60 (03-08 @ 20:33), 7.56 (03-07 @ 12:04)  Plt: 191 (03-08 @ 20:33), 211 (03-07 @ 12:04)    INR: 0.97 03-07-21 @ 12:04  PTT: 32.2 03-07-21 @ 12:04    LIVER FUNCTIONS - ( 07 Mar 2021 12:04 )  Alb: 4.5 g/dL / Pro: 7.7 g/dL / ALK PHOS: 99 U/L / ALT: 26 U/L / AST: 18 U/L / GGT: x             DEVICES:   [] Restraints [x] PIVs [] ET tube [] central line [] PICC [] arterial line [] healy [] NGT/OGT [] EVD [] LD [] JAYNE/HMV [] LiCOX [] ICP monitor [] Trach [] PEG [] Chest Tube [] other:    EXAMINATION:  General: No acute distress  HEENT: Anicteric sclerae  Cardiac: [id number]  Lungs: Clear  Abdomen: Soft, non-tender, +BS  Extremities: No c/c/e  Skin/Incision Site: Clean, dry and intact  Neurologic: Awake, alert, fully oriented, follows commands, PERRL, EOMI, face symmetric, tongue midline, no drift, full strength

## 2021-03-09 NOTE — PROGRESS NOTE ADULT - SUBJECTIVE AND OBJECTIVE BOX
Patient seen and examined at bedside.    Vital Signs Last 24 Hrs  T(C): 36.8 (09 Mar 2021 03:00), Max: 36.8 (09 Mar 2021 03:00)  T(F): 98.2 (09 Mar 2021 03:00), Max: 98.2 (09 Mar 2021 03:00)  HR: 72 (09 Mar 2021 05:30) (63 - 101)  BP: --  BP(mean): --  RR: 16 (09 Mar 2021 05:30) (10 - 31)  SpO2: 95% (09 Mar 2021 05:30) (92% - 100%)    Exam: oriented to name, WILSON strongly AG

## 2021-03-09 NOTE — PROGRESS NOTE ADULT - THIS PATIENT HAS THE FOLLOWING CONDITION(S)/DIAGNOSES ON THIS ADMISSION:
intracerebral hemorrhage, intraventricular hemorrhage
intracerebral hemorrhage, intraventricular hemorrhage
None
None
intracerebral hemorrhage, intraventricular hemorrhage

## 2021-03-09 NOTE — CONSULT NOTE ADULT - PROBLEM SELECTOR RECOMMENDATION 9
SBP goal < 140  Slowly titrate off cardene gtt   Cont with losartan and Nifedipine   add Hydrochlorothiazide 25 daily  Check TTE

## 2021-03-09 NOTE — PROGRESS NOTE ADULT - ASSESSMENT
55 y/o M presents with headache and imbalance, CTH showed R BG/thalamic hemorrhage and IVH likely hypertensive in etiology. Conventional angio ruled out vascular malformation. Exam remains stable, mild drift of LUE.    Recommendations:  [] Pt accepted for tx to STROKE UNIT  [] SBP goal 100-140  [] Wean off Cardene drip as tolerated and transition to PO meds  [] MRI brain w/w/o contrast - can be done as outpatient in 4-6 weeks    [x] Angio ruled out AVM, fistula or other vascular anomaly    [x] Hold AC/AP  [x] HbA1C 8.7,   [x] Continue Lipitor 80mg and titrate to LDL <70  [] Strict hyperglycemic control, FS <180  [] Follow up PT/OT evals  [] Smoking cessation counseling, c/w nicotine patch  [] q4h neurochecks, vitals    Pt seen and discussed with Dr. Sandro Moura, neurovascular attending.    Mari oBwers,   PGY-2  Neurology Resident

## 2021-03-09 NOTE — PROGRESS NOTE ADULT - ASSESSMENT
ASSESSMENT/PLAN: R thalamic hemorrhage with intraventricular extension and ventriculomegaly    NEURO:  CTH reviewed   CTH tomorrow AM   monitor for s/s hydrocephalus given IVH  conventional angiogram -ve   stroke neuro consult  stroke core measures  Activity: [] mobilize as tolerated [x] Bedrest [] PT [] OT [] PMNR    PULM:  encourage incentive spirometry as able    CV:  SBP goal 100-160, nicardipine gtt prn   arterial line placement  losartan   TTE      RENAL:  Fluids: IVL    GI:  Diet: dysphagia screen and advance diet  NPO after midnight  GI prophylaxis [x] not indicated [] PPI [] other:  Bowel regimen [] colace [x] senna [] other:    ENDO:   Goal euglycemia (-180)  Lantus 8 HS and 3 unit premeals     HEME/ONC:  VTE prophylaxis: [x] SCDs [] chemoprophylaxis [x] hold chemoprophylaxis due to: ICH with IVH [] high risk of DVT/PE on admission due to:    ID: monitor for fevers    MISC:    SOCIAL/FAMILY:  [x] awaiting [] updated at bedside [] family meeting    CODE STATUS:  [x] Full Code [] DNR [] DNI [] Palliative/Comfort Care    DISPOSITION:  [x] ICU [] Stroke Unit [] Floor [] EMU [] RCU [] PCU    [x] Patient is at high risk of neurologic deterioration/death due to: intracranial hemorrhage expansion, intraventricular hemorrhage, hydrocephalus, brain comrpession     ASSESSMENT/PLAN: R thalamic hemorrhage with intraventricular extension and ventriculomegaly    NEURO:  Q2 neuro checks   CTH reviewed   monitor for s/s hydrocephalus given IVH  conventional angiogram -ve   stroke neuro consult  stroke core measures  Activity: [] mobilize as tolerated [] Bedrest [x] PT [x] OT [] PMNR    PULM:  encourage incentive spirometry as able    CV:  SBP goal 100-160,   arterial line DC   losartan , nifedipine   TTE      RENAL:  Fluids: IVL    GI:  Diet: CCD  GI prophylaxis [x] not indicated [] PPI [] other:  Bowel regimen [] colace [x] senna [] other:    ENDO:   Goal euglycemia (-180)  Lantus 8 HS and 3 unit premeals     HEME/ONC:  VTE prophylaxis: [x] SCDs [] chemoprophylaxis [x] hold chemoprophylaxis due to: ICH with IVH [] high risk of DVT/PE on admission due to:    ID: monitor for fevers    MISC:    SOCIAL/FAMILY:  [x] awaiting [] updated at bedside [] family meeting    CODE STATUS:  [x] Full Code [] DNR [] DNI [] Palliative/Comfort Care    DISPOSITION:  [] ICU [x] Stroke Unit [] Floor [] EMU [] RCU [] PCU    [x] Patient is at high risk of neurologic deterioration/death due to: intracranial hemorrhage expansion, intraventricular hemorrhage, hydrocephalus, brain comrpession     ASSESSMENT/PLAN: R thalamic hemorrhage with intraventricular extension and ventriculomegaly    NEURO:  Q2 neuro checks   CTH reviewed   monitor for s/s hydrocephalus given IVH  conventional angiogram -ve   stroke neuro consult  stroke core measures  Activity: [] mobilize as tolerated [] Bedrest [x] PT [x] OT [] PMNR    PULM:  encourage incentive spirometry as able    CV:  SBP goal 100-160,   arterial line DC   losartan , nifedipine started today, wean nicardipine gtt as able  TTE      RENAL:  Fluids: IVL    GI:  Diet: CCD  GI prophylaxis [x] not indicated [] PPI [] other:  Bowel regimen [] colace [x] senna [] other:    ENDO:   Goal euglycemia (-180)  Lantus 8 HS and 3 unit premeals     HEME/ONC:  VTE prophylaxis: [x] SCDs [] chemoprophylaxis [x] hold chemoprophylaxis due to: ICH with IVH [] high risk of DVT/PE on admission due to:    ID: monitor for fevers    MISC:    SOCIAL/FAMILY:  [x] awaiting [] updated at bedside [] family meeting    CODE STATUS:  [x] Full Code [] DNR [] DNI [] Palliative/Comfort Care    DISPOSITION:  [] ICU [x] Stroke Unit [] Floor [] EMU [] RCU [] PCU    not critically ill but medically complex

## 2021-03-10 ENCOUNTER — TRANSCRIPTION ENCOUNTER (OUTPATIENT)
Age: 56
End: 2021-03-10

## 2021-03-10 LAB
ANION GAP SERPL CALC-SCNC: 14 MMOL/L — SIGNIFICANT CHANGE UP (ref 5–17)
BUN SERPL-MCNC: 17 MG/DL — SIGNIFICANT CHANGE UP (ref 7–23)
CALCIUM SERPL-MCNC: 10.1 MG/DL — SIGNIFICANT CHANGE UP (ref 8.4–10.5)
CHLORIDE SERPL-SCNC: 97 MMOL/L — SIGNIFICANT CHANGE UP (ref 96–108)
CO2 SERPL-SCNC: 23 MMOL/L — SIGNIFICANT CHANGE UP (ref 22–31)
CREAT SERPL-MCNC: 0.53 MG/DL — SIGNIFICANT CHANGE UP (ref 0.5–1.3)
GLUCOSE BLDC GLUCOMTR-MCNC: 181 MG/DL — HIGH (ref 70–99)
GLUCOSE BLDC GLUCOMTR-MCNC: 212 MG/DL — HIGH (ref 70–99)
GLUCOSE BLDC GLUCOMTR-MCNC: 218 MG/DL — HIGH (ref 70–99)
GLUCOSE BLDC GLUCOMTR-MCNC: 249 MG/DL — HIGH (ref 70–99)
GLUCOSE SERPL-MCNC: 202 MG/DL — HIGH (ref 70–99)
HCT VFR BLD CALC: 45.1 % — SIGNIFICANT CHANGE UP (ref 39–50)
HGB BLD-MCNC: 15.5 G/DL — SIGNIFICANT CHANGE UP (ref 13–17)
MCHC RBC-ENTMCNC: 30.4 PG — SIGNIFICANT CHANGE UP (ref 27–34)
MCHC RBC-ENTMCNC: 34.4 GM/DL — SIGNIFICANT CHANGE UP (ref 32–36)
MCV RBC AUTO: 88.4 FL — SIGNIFICANT CHANGE UP (ref 80–100)
NRBC # BLD: 0 /100 WBCS — SIGNIFICANT CHANGE UP (ref 0–0)
PLATELET # BLD AUTO: 194 K/UL — SIGNIFICANT CHANGE UP (ref 150–400)
POTASSIUM SERPL-MCNC: 3.4 MMOL/L — LOW (ref 3.5–5.3)
POTASSIUM SERPL-SCNC: 3.4 MMOL/L — LOW (ref 3.5–5.3)
RBC # BLD: 5.1 M/UL — SIGNIFICANT CHANGE UP (ref 4.2–5.8)
RBC # FLD: 12.2 % — SIGNIFICANT CHANGE UP (ref 10.3–14.5)
SARS-COV-2 RNA SPEC QL NAA+PROBE: SIGNIFICANT CHANGE UP
SODIUM SERPL-SCNC: 134 MMOL/L — LOW (ref 135–145)
WBC # BLD: 8.6 K/UL — SIGNIFICANT CHANGE UP (ref 3.8–10.5)
WBC # FLD AUTO: 8.6 K/UL — SIGNIFICANT CHANGE UP (ref 3.8–10.5)

## 2021-03-10 PROCEDURE — 99222 1ST HOSP IP/OBS MODERATE 55: CPT

## 2021-03-10 RX ORDER — POTASSIUM CHLORIDE 20 MEQ
40 PACKET (EA) ORAL EVERY 4 HOURS
Refills: 0 | Status: COMPLETED | OUTPATIENT
Start: 2021-03-10 | End: 2021-03-10

## 2021-03-10 RX ORDER — ACETAMINOPHEN 500 MG
650 TABLET ORAL EVERY 6 HOURS
Refills: 0 | Status: DISCONTINUED | OUTPATIENT
Start: 2021-03-10 | End: 2021-03-11

## 2021-03-10 RX ADMIN — Medication 1 PATCH: at 12:17

## 2021-03-10 RX ADMIN — Medication 1 TABLET(S): at 11:24

## 2021-03-10 RX ADMIN — Medication 4: at 11:50

## 2021-03-10 RX ADMIN — Medication 100 MILLIGRAM(S): at 11:24

## 2021-03-10 RX ADMIN — ATORVASTATIN CALCIUM 80 MILLIGRAM(S): 80 TABLET, FILM COATED ORAL at 21:40

## 2021-03-10 RX ADMIN — Medication 5 UNIT(S): at 07:52

## 2021-03-10 RX ADMIN — Medication 650 MILLIGRAM(S): at 07:55

## 2021-03-10 RX ADMIN — Medication 4: at 07:52

## 2021-03-10 RX ADMIN — INSULIN GLARGINE 8 UNIT(S): 100 INJECTION, SOLUTION SUBCUTANEOUS at 21:39

## 2021-03-10 RX ADMIN — Medication 0.1 MILLIGRAM(S): at 11:24

## 2021-03-10 RX ADMIN — Medication 5 UNIT(S): at 17:16

## 2021-03-10 RX ADMIN — Medication 4: at 17:16

## 2021-03-10 RX ADMIN — Medication 25 MILLIGRAM(S): at 05:08

## 2021-03-10 RX ADMIN — Medication 1 MILLIGRAM(S): at 11:24

## 2021-03-10 RX ADMIN — OXYCODONE HYDROCHLORIDE 5 MILLIGRAM(S): 5 TABLET ORAL at 05:15

## 2021-03-10 RX ADMIN — Medication 650 MILLIGRAM(S): at 08:25

## 2021-03-10 RX ADMIN — Medication 0.1 MILLIGRAM(S): at 21:40

## 2021-03-10 RX ADMIN — Medication 40 MILLIEQUIVALENT(S): at 15:14

## 2021-03-10 RX ADMIN — LOSARTAN POTASSIUM 100 MILLIGRAM(S): 100 TABLET, FILM COATED ORAL at 05:08

## 2021-03-10 RX ADMIN — OXYCODONE HYDROCHLORIDE 10 MILLIGRAM(S): 5 TABLET ORAL at 16:21

## 2021-03-10 RX ADMIN — SENNA PLUS 2 TABLET(S): 8.6 TABLET ORAL at 21:40

## 2021-03-10 RX ADMIN — Medication 90 MILLIGRAM(S): at 05:08

## 2021-03-10 RX ADMIN — ENOXAPARIN SODIUM 40 MILLIGRAM(S): 100 INJECTION SUBCUTANEOUS at 17:16

## 2021-03-10 RX ADMIN — OXYCODONE HYDROCHLORIDE 5 MILLIGRAM(S): 5 TABLET ORAL at 04:45

## 2021-03-10 RX ADMIN — OXYCODONE HYDROCHLORIDE 10 MILLIGRAM(S): 5 TABLET ORAL at 16:36

## 2021-03-10 RX ADMIN — Medication 1 PATCH: at 07:51

## 2021-03-10 RX ADMIN — Medication 40 MILLIEQUIVALENT(S): at 17:15

## 2021-03-10 RX ADMIN — Medication 1 PATCH: at 11:24

## 2021-03-10 RX ADMIN — Medication 5 UNIT(S): at 11:50

## 2021-03-10 NOTE — DIETITIAN INITIAL EVALUATION ADULT. - OTHER INFO
Dosing wt (3/8/21): 234.7 lbs. Per pt, reports UBW:     Pt currently prescribed a consistent carbohydrate diet. A1c 8.7%, indicating poor glycemic control PTA. No formal diagnosis of DM noted. Pt with no history of taking antihyperglycemic medications PTA. In-house, pt prescribed Insulin Lispro sliding scale and Lantus to aid in hyperglycemic management.    Edema: none noted  Skin: surgical incision right groin    GI: no documented BM's recorded in-house thus far. On bowel regimen as ordered (Miralax, senna). Dosing wt (3/8/21): 234.7 lbs. Per pt, reports UBW: 220-230 lbs. Appears consistent, will monitor. Per pt, believes he has lost "strength" and "muscle", as it has been more difficulty going up and down the stairs.     Pt currently prescribed a consistent carbohydrate diet, with improving appetite noted since admission/initiation of PO diet. Reports "I have received 4 meals so far". To note, A1c 8.7%, indicating poor glycemic control PTA. Denies prior hx of DM (reports mother with hx of DM); with no prior history of taking antihyperglycemic medications PTA. In-house, pt prescribed Insulin Lispro sliding scale and Lantus to aid in hyperglycemic management.     Edema: none noted  Skin: surgical incision right groin    GI: no documented BM's recorded in-house thus far. On bowel regimen as ordered (Miralax, senna). Dosing wt (3/8/21): 234.7 lbs. Per pt, reports UBW: 220-230 lbs. Appears consistent, will monitor. Per pt, believes he has lost "strength" and "muscle", as it has been more difficulty going up and down the stairs.     Pt currently prescribed a consistent carbohydrate diet, with improving appetite noted since admission/initiation of PO diet. Reports "I have received 4 meals so far". To note, A1c 8.7%, indicating poor glycemic control PTA. Denies prior hx of DM (reports mother with hx of DM); with no prior history of taking antihyperglycemic medications PTA. In-house, pt prescribed Insulin Lispro sliding scale and Lantus to aid in hyperglycemic management. Diet education deferred at this time 2/2 pt with no prior diagnosis of DM noted.     Edema: none noted  Skin: surgical incision right groin    GI: no documented BM's recorded in-house thus far. On bowel regimen as ordered (Miralax, senna).

## 2021-03-10 NOTE — DISCHARGE NOTE PROVIDER - NSDCCPCAREPLAN_GEN_ALL_CORE_FT
PRINCIPAL DISCHARGE DIAGNOSIS  Diagnosis: Hemorrhagic stroke  Assessment and Plan of Treatment: Please follow up with neurologist in 1 week. Continue taking medications as prescribed. Monitor your blood pressure. Reduce fat, cholesterol and salt in your diet. Increase intake of fruits and vegetables. Limit alcohol to minimum and do not smoke. You may be at risk for falling, make changes to your home to help you walk easier. Keep up to date on vaccinations.  If you experience any symptoms of facial drooping, slurred speech, arm or leg weakness, severe headache, vision changes or any worsening symptoms, notify provider immediatley and return to ER.

## 2021-03-10 NOTE — DIETITIAN INITIAL EVALUATION ADULT. - PERTINENT MEDS FT
Lovenox, Lantus, Insulin Lispro, Lipitor, Hydrochlorothiazide, Procardia, Folic Acid, Cozaar, Multivitamin, Oxycodone, Vitamin B1, Miralax, Senna

## 2021-03-10 NOTE — PROGRESS NOTE ADULT - ASSESSMENT
55 y/o M presents with headache and imbalance, CTH showed R BG/thalamic hemorrhage and IVH likely hypertensive in etiology. Conventional angio done which ruled out vascular malformation.    Impression:  R BG/thalamic hemorrhage and IVH likely due to chronic hypertension    NEURO: Continue close monitoring for neurologic deterioration, - started on high dose statin, patient had cerebral angiogram on 03/08 by Dr. Rainey which was negative,  plan for MRI Brain w/o in 4-6 weeks ( 04/04-04/16) to rule out any underlying vascular malformation. Physical therapy/OT recommended AR.    ANTITHROMBOTIC THERAPY: none due to hemorrhage    PULMONARY: CXR clear, protecting airway, saturating well     CARDIOVASCULAR: TTE: EF 75-80%, normal left atrium, cardiac monitoring, cardiology consulted for BP control                             SBP goal: SBP < 160    GASTROINTESTINAL:  dysphagia screen- passed, tolerating diet       Diet: Regular    RENAL: BUN/Cr without acute change, good urine output      Na Goal: Greater than 135     [place]: N    HEMATOLOGY: H/H without acute change, Platelets 194     DVT ppx: Heparin s.c [] LMWH [x]     ID: afebrile, no leukocytosis, no si/sx of infection    OTHER: Plan of care discussed with patient at bedside    DISPOSITION: AR once bed available      CORE MEASURES:        Admission NIHSS: 0     TPA: [] YES [x] NO      LDL/HDL: 141/ 54     Depression Screen: 0     Statin Therapy: Y     Dysphagia Screen: [x] PASS [] FAIL     Smoking [x] YES [] NO      Afib [] YES [x] NO    Stroke Education [x] YES [] NO    Obtain screening lower extremity venous ultrasound in patients who meet 1 or more of the following criteria as patient is high risk for DVT/PE on admission:   [] History of DVT/PE  []Hypercoagulable states (Factor V Leiden, Cancer, OCP, etc. )  []Prolonged immobility (hemiplegia/hemiparesis/post operative or any other extended immobilization)  [] Transferred from outside facility (Rehab or Long term care)  [x] Age </= to 50.

## 2021-03-10 NOTE — DIETITIAN INITIAL EVALUATION ADULT. - CHIEF COMPLAINT
The patient is a 57 yo M with PMH: headache and imbalance. CTH showed R BG/thalamic hemorrhage and IVH, likely hypertensive in etiology. Conventional angio ruled out vascular malformation. Pt now transferred to Stroke Unit.

## 2021-03-10 NOTE — PROGRESS NOTE ADULT - SUBJECTIVE AND OBJECTIVE BOX
THE PATIENT WAS SEEN AND EXAMINED BY ME WITH THE HOUSESTAFF AND STROKE TEAM DURING MORNING ROUNDS.   HPI:  56M here w/ headache, imbalance, fall from 1400 3/6. As per patient, had posterior distribution headache yesterday, moderate intensity. Felt dizzy, imbalanced, fell. Went to urgent care, found to have high BP yesterday. Currently feels well. Code stroke called in ED today.   (Stroke only)  NIHSS: 0  MRS: 0  ICH: 2    SUBJECTIVE: No events overnight.  No new neurologic complaints.      acetaminophen   Tablet .. 650 milliGRAM(s) Oral every 6 hours PRN  atorvastatin 80 milliGRAM(s) Oral at bedtime  cloNIDine 0.1 milliGRAM(s) Oral two times a day  dextrose 40% Gel 15 Gram(s) Oral once  dextrose 50% Injectable 25 Gram(s) IV Push once  dextrose 50% Injectable 12.5 Gram(s) IV Push once  dextrose 50% Injectable 25 Gram(s) IV Push once  enoxaparin Injectable 40 milliGRAM(s) SubCutaneous <User Schedule>  folic acid 1 milliGRAM(s) Oral daily  glucagon  Injectable 1 milliGRAM(s) IntraMuscular once  hydrochlorothiazide 25 milliGRAM(s) Oral daily  influenza   Vaccine 0.5 milliLiter(s) IntraMuscular once  insulin glargine Injectable (LANTUS) 8 Unit(s) SubCutaneous at bedtime  insulin lispro (ADMELOG) corrective regimen sliding scale   SubCutaneous Before meals and at bedtime  insulin lispro Injectable (ADMELOG) 5 Unit(s) SubCutaneous three times a day before meals  losartan 100 milliGRAM(s) Oral daily  multivitamin 1 Tablet(s) Oral daily  nicotine -  14 mG/24Hr(s) Patch 1 patch Transdermal daily  NIFEdipine XL 90 milliGRAM(s) Oral daily  oxyCODONE    IR 5 milliGRAM(s) Oral every 4 hours PRN  oxyCODONE    IR 10 milliGRAM(s) Oral every 4 hours PRN  polyethylene glycol 3350 17 Gram(s) Oral two times a day  senna 2 Tablet(s) Oral at bedtime  thiamine 100 milliGRAM(s) Oral daily      PHYSICAL EXAM:   Vital Signs Last 24 Hrs  T(C): 36.9 (10 Mar 2021 08:36), Max: 37.2 (09 Mar 2021 20:00)  T(F): 98.5 (10 Mar 2021 08:36), Max: 98.9 (09 Mar 2021 20:00)  HR: 94 (10 Mar 2021 10:39) (71 - 110)  BP: 160/106 (10 Mar 2021 10:39) (124/77 - 160/106)  BP(mean): 120 (10 Mar 2021 10:39) (90 - 120)  RR: 19 (10 Mar 2021 10:39) (10 - 23)  SpO2: 96% (10 Mar 2021 10:39) (87% - 100%)     ID: [id number]  General: No acute distress  HEENT: EOM intact, visual fields full  Abdomen: Soft, nontender, nondistended   Extremities: No edema    NEUROLOGICAL EXAM:  Mental status: Awake, alert, oriented x3, no aphasia, no neglect, normal memory   Cranial Nerves: No facial asymmetry, no nystagmus, no dysarthria,  tongue midline  Motor exam: Normal tone, no drift, 5/5 RUE, 5/5 RLE, 4/5 LUE, 4/5 LLE, normal fine finger movements.  Sensation: Intact to light touch   Coordination/ Gait: No dysmetria, THIERNO intact and symmetric bilaterally    LABS:                        15.5   8.60  )-----------( 194      ( 10 Mar 2021 05:37 )             45.1    03-10    134<L>  |  97  |  17  ----------------------------<  202<H>  3.4<L>   |  23  |  0.53    Ca    10.1      10 Mar 2021 05:37  Phos  2.7     03-08  Mg     2.1     03-08          IMAGING: Reviewed by me.     CT Head/ CTA H/N  (03/07/21):   Acute right thalamic hemorrhage measuring up to 2.4 cm with intraventricular extension. There is no midline shift.  Mild ventriculomegaly is suggested. Cannot rule out developing hydrocephalus.  Age-indeterminate infarcts involving bilateral basal ganglia and thalami. THE PATIENT WAS SEEN AND EXAMINED BY ME WITH THE HOUSESTAFF AND STROKE TEAM DURING MORNING ROUNDS.   HPI:  56M here w/ headache, imbalance, fall from 1400 3/6. As per patient, had posterior distribution headache yesterday, moderate intensity. Felt dizzy, imbalanced, fell. Went to urgent care, found to have high BP yesterday. Currently feels well. Code stroke called in ED today.   (Stroke only)  NIHSS: 0  MRS: 0  ICH: 2    SUBJECTIVE: No events overnight.  No new neurologic complaints.      acetaminophen   Tablet .. 650 milliGRAM(s) Oral every 6 hours PRN  atorvastatin 80 milliGRAM(s) Oral at bedtime  cloNIDine 0.1 milliGRAM(s) Oral two times a day  dextrose 40% Gel 15 Gram(s) Oral once  dextrose 50% Injectable 25 Gram(s) IV Push once  dextrose 50% Injectable 12.5 Gram(s) IV Push once  dextrose 50% Injectable 25 Gram(s) IV Push once  enoxaparin Injectable 40 milliGRAM(s) SubCutaneous <User Schedule>  folic acid 1 milliGRAM(s) Oral daily  glucagon  Injectable 1 milliGRAM(s) IntraMuscular once  hydrochlorothiazide 25 milliGRAM(s) Oral daily  influenza   Vaccine 0.5 milliLiter(s) IntraMuscular once  insulin glargine Injectable (LANTUS) 8 Unit(s) SubCutaneous at bedtime  insulin lispro (ADMELOG) corrective regimen sliding scale   SubCutaneous Before meals and at bedtime  insulin lispro Injectable (ADMELOG) 5 Unit(s) SubCutaneous three times a day before meals  losartan 100 milliGRAM(s) Oral daily  multivitamin 1 Tablet(s) Oral daily  nicotine -  14 mG/24Hr(s) Patch 1 patch Transdermal daily  NIFEdipine XL 90 milliGRAM(s) Oral daily  oxyCODONE    IR 5 milliGRAM(s) Oral every 4 hours PRN  oxyCODONE    IR 10 milliGRAM(s) Oral every 4 hours PRN  polyethylene glycol 3350 17 Gram(s) Oral two times a day  senna 2 Tablet(s) Oral at bedtime  thiamine 100 milliGRAM(s) Oral daily      PHYSICAL EXAM:   Vital Signs Last 24 Hrs  T(C): 36.9 (10 Mar 2021 08:36), Max: 37.2 (09 Mar 2021 20:00)  T(F): 98.5 (10 Mar 2021 08:36), Max: 98.9 (09 Mar 2021 20:00)  HR: 94 (10 Mar 2021 10:39) (71 - 110)  BP: 160/106 (10 Mar 2021 10:39) (124/77 - 160/106)  BP(mean): 120 (10 Mar 2021 10:39) (90 - 120)  RR: 19 (10 Mar 2021 10:39) (10 - 23)  SpO2: 96% (10 Mar 2021 10:39) (87% - 100%)     ID: [id number]  General: No acute distress  HEENT: EOM intact, visual fields full  Abdomen: Soft, nontender, nondistended   Extremities: No edema    NEUROLOGICAL EXAM:  Mental status: Awake, alert, oriented x3, no aphasia, no neglect, normal memory   Cranial Nerves: No facial asymmetry, no nystagmus, no dysarthria,  tongue midline  Motor exam: Normal tone, no drift, 5/5 RUE, 5/5 RLE, 4/5 LUE, 4/5 LLE, normal fine finger movements.  Sensation: Intact to light touch   Coordination/ Gait: No dysmetria, THIERNO intact and symmetric bilaterally    LABS:                        15.5   8.60  )-----------( 194      ( 10 Mar 2021 05:37 )             45.1    03-10    134<L>  |  97  |  17  ----------------------------<  202<H>  3.4<L>   |  23  |  0.53    Ca    10.1      10 Mar 2021 05:37  Phos  2.7     03-08  Mg     2.1     03-08          IMAGING: Reviewed by me.   CT Head (03/09/21): Slightly decreased size of right thalamic hemorrhage. Grossly stable intraventricular hemorrhage.    IR Neuro:  Diagnostic cerebral angiogram demonstrating diffuse mild intracranial atherosclerotic disease without significant stenosis, and no evidence of aneurysm, arteriovenous malformation or fistula.    CT Head/ CTA H/N  (03/07/21):   Acute right thalamic hemorrhage measuring up to 2.4 cm with intraventricular extension. There is no midline shift.  Mild ventriculomegaly is suggested. Cannot rule out developing hydrocephalus.  Age-indeterminate infarcts involving bilateral basal ganglia and thalami.

## 2021-03-10 NOTE — DIETITIAN INITIAL EVALUATION ADULT. - ORAL INTAKE PTA/DIET HISTORY
Pt observed resting in bed at time of RD visit. Per discussion with pt, reports _____. Pt observed resting in bed at time of RD visit. Per discussion with pt, reports "so-so" appetite at baseline; ~2 meals/day with no significant changes noted in overall PO intake. Endorsed preference for Telugu foods; spouse typically prepares foods. Specific items/food preferences not disclosed. Pt denies intolerance to chewing/swallowing, denies food allergies. Reports taking daily vitamin C at home. Denies N/V/C/diarrhea.

## 2021-03-10 NOTE — PROGRESS NOTE ADULT - ASSESSMENT
57 y/o M presents with headache and imbalance, CTH showed R BG/thalamic hemorrhage and IVH likely hypertensive in etiology. Conventional angio ruled out vascular malformation. Exam remains stable, mild drift of LUE.

## 2021-03-10 NOTE — CONSULT NOTE ADULT - SUBJECTIVE AND OBJECTIVE BOX
HPI:  56M p/w headache, imbalance, since yesterday,CT head w/ R thalamic hemorrhage with IVH, ICH score 2. Exam: AAOx3, eomi, no facial, PERRL, no drift, WILSON 5/5.  (07 Mar 2021 13:21)    Patient was admitted on 3/7, CTH with right BG/thalamic hemorrhage, IVH, now s/p angio which ruled out vascular malformation. Patient seen today, feels he is "walking like a baby", denies weakness.     REVIEW OF SYSTEMS  Constitutional - No fever, No weight loss, No fatigue  HEENT - No eye pain, No visual disturbances, No difficulty hearing, No tinnitus, No vertigo, No neck pain  Respiratory - No cough, No wheezing, No shortness of breath  Cardiovascular - No chest pain, No palpitations  Gastrointestinal - No abdominal pain, No nausea, No vomiting, No diarrhea, No constipation  Genitourinary - No dysuria, No frequency, No hematuria, No incontinence  Neurological - No headaches, No memory loss, No loss of strength, No numbness, No tremors  Musculoskeletal - No joint pain, No joint swelling, No muscle pain  Psychiatric - No depression, No anxiety    VITALS  T(C): 36.9 (03-10-21 @ 08:36), Max: 37.2 (03-09-21 @ 20:00)  HR: 92 (03-10-21 @ 14:00) (71 - 97)  BP: 131/84 (03-10-21 @ 14:00) (131/84 - 160/106)  RR: 21 (03-10-21 @ 14:00) (16 - 23)  SpO2: 94% (03-10-21 @ 14:00) (87% - 100%)  Wt(kg): --    PAST MEDICAL & SURGICAL HISTORY  CAD (coronary artery disease)    HTN (hypertension)    DM (diabetes mellitus)    HLD (hyperlipidemia)    No significant past surgical history        SOCIAL HISTORY  Smoking - Denied  EtOH - Denied   Drugs - Denied    FUNCTIONAL HISTORY  Lives with wife, in apt, 2 steps to enter, one flight inside  Independent AMB and ADLs PTA     CURRENT FUNCTIONAL STATUS  3/10 SLP  cognitive linguistic deficits  executive function deficits suspected    3/10 PT  transfers close supervision  gait contact guard x 18 feet, RW    3/10 OT  ambulatory transfer to toilet, CG/min assist  poor awareness of left leg    FAMILY HISTORY   Family history of sudden cardiac death in brother (Sibling)        RECENT LABS/IMAGING  CBC Full  -  ( 10 Mar 2021 05:37 )  WBC Count : 8.60 K/uL  RBC Count : 5.10 M/uL  Hemoglobin : 15.5 g/dL  Hematocrit : 45.1 %  Platelet Count - Automated : 194 K/uL  Mean Cell Volume : 88.4 fl  Mean Cell Hemoglobin : 30.4 pg  Mean Cell Hemoglobin Concentration : 34.4 gm/dL  Auto Neutrophil # : x  Auto Lymphocyte # : x  Auto Monocyte # : x  Auto Eosinophil # : x  Auto Basophil # : x  Auto Neutrophil % : x  Auto Lymphocyte % : x  Auto Monocyte % : x  Auto Eosinophil % : x  Auto Basophil % : x    03-10    134<L>  |  97  |  17  ----------------------------<  202<H>  3.4<L>   |  23  |  0.53    Ca    10.1      10 Mar 2021 05:37  Phos  2.7     03-08  Mg     2.1     03-08      < from: CT Head No Cont (03.09.21 @ 08:24) >    IMPRESSION:  Slightly decreased size of right thalamic hemorrhage. Grossly stable intraventricular hemorrhage.        < end of copied text >      ALLERGIES  No Known Allergies      MEDICATIONS   acetaminophen   Tablet .. 650 milliGRAM(s) Oral every 6 hours PRN  atorvastatin 80 milliGRAM(s) Oral at bedtime  cloNIDine 0.1 milliGRAM(s) Oral two times a day  dextrose 40% Gel 15 Gram(s) Oral once  dextrose 50% Injectable 25 Gram(s) IV Push once  dextrose 50% Injectable 12.5 Gram(s) IV Push once  dextrose 50% Injectable 25 Gram(s) IV Push once  enoxaparin Injectable 40 milliGRAM(s) SubCutaneous <User Schedule>  folic acid 1 milliGRAM(s) Oral daily  glucagon  Injectable 1 milliGRAM(s) IntraMuscular once  hydrochlorothiazide 25 milliGRAM(s) Oral daily  influenza   Vaccine 0.5 milliLiter(s) IntraMuscular once  insulin glargine Injectable (LANTUS) 8 Unit(s) SubCutaneous at bedtime  insulin lispro (ADMELOG) corrective regimen sliding scale   SubCutaneous Before meals and at bedtime  insulin lispro Injectable (ADMELOG) 5 Unit(s) SubCutaneous three times a day before meals  losartan 100 milliGRAM(s) Oral daily  multivitamin 1 Tablet(s) Oral daily  nicotine -  14 mG/24Hr(s) Patch 1 patch Transdermal daily  NIFEdipine XL 90 milliGRAM(s) Oral daily  oxyCODONE    IR 5 milliGRAM(s) Oral every 4 hours PRN  oxyCODONE    IR 10 milliGRAM(s) Oral every 4 hours PRN  polyethylene glycol 3350 17 Gram(s) Oral two times a day  potassium chloride    Tablet ER 40 milliEquivalent(s) Oral every 4 hours  senna 2 Tablet(s) Oral at bedtime  thiamine 100 milliGRAM(s) Oral daily      ----------------------------------------------------------------------------------------  PHYSICAL EXAM  Constitutional - NAD, Comfortable, sitting in chair   HEENT - NCAT, EOMI  Neck - Supple, No limited ROM  Chest - Breathing comfortably  Cardiovascular - S1S2   Abdomen - Soft   Extremities - No C/C/E, No calf tenderness   Neurologic Exam -                    Cognitive - Awake, Alert, AAO to self, place, date, year, situation     Communication - Fluent, No dysarthria     Cranial Nerves - no facial assymetry     Motor - mild left sided weakness, +pronator drift      Sensory - Intact to LT     Psychiatric - Mood stable, Affect WNL  ----------------------------------------------------------------------------------------  ASSESSMENT/PLAN  56 year old man with functional deficits after right BG/thalamic hemorrhage, IVH with gait impairment  s/p angio on 3/8, negative   CTH stable  Pain - Tylenol  DVT PPX - SCDs lovenox   Rehab - Will continue to follow for ongoing rehab needs and recommendations.   continue bedside therapy, PT/OT  patient requires CG/min assist with transfers 3/10  PT/OT follow up tomorrow, if no improvement, then would benefit from short stay at inpatient rehabilitation    Recommend ACUTE inpatient rehabilitation for the functional deficits consisting of 3 hours of therapy/day & 24 hour RN/daily PMR physician for comorbid medical management. Patient will be able to tolerate 3 hours a day.

## 2021-03-10 NOTE — DIETITIAN INITIAL EVALUATION ADULT. - PERTINENT LABORATORY DATA
(3/10): Na 134, K 3.4, Glu 202, POCT Blood Glucose 244, 204, 215, 210  (3/8): A1c 8.7%, Estimated Average Glucose 203, Cholesterol 234,

## 2021-03-10 NOTE — DIETITIAN INITIAL EVALUATION ADULT. - ADD RECOMMEND
1) Recommend continue consistent carbohydrate diet. Defer consistency to medical team, SLP. 2) Monitor and encourage PO intake; encourage use of daily menus. Honor dietary preferences as expressed as able. 3) Monitor wt trends, nutrition related labs, skin integrity, hydration status and bowel regularity. 4) RD to remain available for diet education; deferred at this time, as pt with no formal diagnosis of DM. Hyperglycemic regimen as per discretion of medical team, endocrine.

## 2021-03-10 NOTE — DISCHARGE NOTE PROVIDER - HOSPITAL COURSE
56M here w/ headache, imbalance, fall from 1400 3/6. As per patient, had posterior distribution headache yesterday, moderate intensity. Felt dizzy, imbalanced, fell. Went to urgent care, found to have high BP yesterday. Currently feels well. Code stroke called in ED today.   (Stroke only)  NIHSS: 0  MRS: 0  ICH: 2    CT Head (03/09/21): Slightly decreased size of right thalamic hemorrhage. Grossly stable intraventricular hemorrhage.    IR Neuro:  Diagnostic cerebral angiogram demonstrating diffuse mild intracranial atherosclerotic disease without significant stenosis, and no evidence of aneurysm, arteriovenous malformation or fistula.    CT Head/ CTA H/N  (03/07/21):   Acute right thalamic hemorrhage measuring up to 2.4 cm with intraventricular extension. There is no midline shift.  Mild ventriculomegaly is suggested. Cannot rule out developing hydrocephalus.  Age-indeterminate infarcts involving bilateral basal ganglia and thalami.    Impression:  R BG/thalamic hemorrhage and IVH likely due to chronic hypertension    plan for MRI Brain w/o in 4-6 weeks ( 04/04-04/16) to rule out any underlying vascular malformation.    Evaluated by PT/OT and was recommended AR. Patient stable for discharge.      56M here w/ headache, imbalance, fall from 1400 3/6. As per patient, had posterior distribution headache yesterday, moderate intensity. Felt dizzy, imbalanced, fell. Went to urgent care, found to have high BP yesterday. Currently feels well. Code stroke called in ED today.   (Stroke only)  NIHSS: 0  MRS: 0  ICH: 2    CT Head (03/09/21): Slightly decreased size of right thalamic hemorrhage. Grossly stable intraventricular hemorrhage.    IR Neuro:  Diagnostic cerebral angiogram demonstrating diffuse mild intracranial atherosclerotic disease without significant stenosis, and no evidence of aneurysm, arteriovenous malformation or fistula.    CT Head/ CTA H/N  (03/07/21):   Acute right thalamic hemorrhage measuring up to 2.4 cm with intraventricular extension. There is no midline shift.  Mild ventriculomegaly is suggested. Cannot rule out developing hydrocephalus.  Age-indeterminate infarcts involving bilateral basal ganglia and thalami.    CT Lumbar spine: No acute fracture or dislocation. No evidence for spinal canal stenosis within the limits of CT.    Impression:  R BG/thalamic hemorrhage and IVH likely due to chronic hypertension    plan for MRI Brain w/o in 4-6 weeks ( 04/04-04/16) to rule out any underlying vascular malformation.    Evaluated by PT/OT and was recommended AR. Patient stable for discharge.

## 2021-03-10 NOTE — DISCHARGE NOTE PROVIDER - PROVIDER TOKENS
PROVIDER:[TOKEN:[[id number]],FOLLOWUP:[1 month]] PROVIDER:[TOKEN:[[id number]],FOLLOWUP:[1 month]],PROVIDER:[TOKEN:[[id number]],FOLLOWUP:[1 month]]

## 2021-03-10 NOTE — DISCHARGE NOTE PROVIDER - NSDCMRMEDTOKEN_GEN_ALL_CORE_FT
Advil: 1 tab(s) orally , As Needed  amLODIPine 10 mg oral tablet: 1 tab(s) orally once a day  Aspirin Enteric Coated 81 mg oral delayed release tablet: 1 tab(s) orally once a day   atorvastatin 10 mg oral tablet: 1 tab(s) orally once a day  cloNIDine 0.2 mg oral tablet: 1 tab(s) orally 2 times a day  clopidogrel 75 mg oral tablet: 1 tab(s) orally once a day   Ecotrin Adult Low Strength 81 mg oral delayed release tablet: 1 tab(s) orally once a day  losartan-hydroCHLOROthiazide 100 mg-25 mg oral tablet: 1 tab(s) orally once a day  metFORMIN 500 mg oral tablet: 1 tab(s) orally 2 times a day   Advil: 1 tab(s) orally , As Needed  amLODIPine 10 mg oral tablet: 1 tab(s) orally once a day  Aspirin Enteric Coated 81 mg oral delayed release tablet: 1 tab(s) orally once a day   atorvastatin 10 mg oral tablet: 1 tab(s) orally once a day  cloNIDine 0.2 mg oral tablet: 1 tab(s) orally 2 times a day  clopidogrel 75 mg oral tablet: 1 tab(s) orally once a day   Ecotrin Adult Low Strength 81 mg oral delayed release tablet: 1 tab(s) orally once a day  losartan-hydroCHLOROthiazide 100 mg-25 mg oral tablet: 1 tab(s) orally once a day  metFORMIN 500 mg oral tablet: 1 tab(s) orally 2 times a day  Rolling [place]: Dx: Stroke   aluminum hydroxide-magnesium hydroxide 200 mg-200 mg/5 mL oral suspension: 30 milliliter(s) orally every 4 hours, As needed, Dyspepsia  atorvastatin 80 mg oral tablet: 1 tab(s) orally once a day (at bedtime)  cloNIDine 0.3 mg oral tablet: 1 tab(s) orally 3 times a day  enoxaparin: 40 milligram(s) subcutaneous once a day (at bedtime)  folic acid 1 mg oral tablet: 1 tab(s) orally once a day  hydroCHLOROthiazide 25 mg oral tablet: 1 tab(s) orally once a day  insulin glargine: 8 unit(s) subcutaneous once a day (at bedtime)  insulin lispro 100 units/mL injectable solution: 5 unit(s) injectable 3 times a day (before meals)  insulin lispro 100 units/mL subcutaneous solution: 2 Unit(s) if Glucose 151 - 200  4 Unit(s) if Glucose 201 - 250  6 Unit(s) if Glucose 251 - 300  8 Unit(s) if Glucose 301 - 350  10 Unit(s) if Glucose 351 - 400  12 Unit(s) if Glucose Greater Than 400  Three times a day before meals and before bedtime  losartan 100 mg oral tablet: 1 tab(s) orally once a day  Multiple Vitamins oral tablet: 1 tab(s) orally once a day  nicotine 14 mg/24 hr transdermal film, extended release: 1 patch transdermal once a day  NIFEdipine 90 mg oral tablet, extended release: 1 tab(s) orally once a day  polyethylene glycol 3350 oral powder for reconstitution: 17 gram(s) orally 2 times a day  senna oral tablet: 2 tab(s) orally once a day (at bedtime)  traMADol 50 mg oral tablet: 0.5 tab(s) orally every 6 hours, As needed, Moderate Pain (4 - 6)

## 2021-03-10 NOTE — DISCHARGE NOTE PROVIDER - CARE PROVIDER_API CALL
Sandro Moura)  Neurology; Vascular Neurology  [address] [facility], [address]  [place] [address]  Phone: [phone]  Fax: [phone]  Follow Up Time: 1 month   Sandro Moura)  Neurology; Vascular Neurology  [address] [facility] [address] [facility], [address]  [place] [address]  Phone: [phone]  Fax: [phone]  Follow Up Time: 1 month    Joni Yeung ()  Cardiology; Internal Medicine  [address] [facility] [facility] [address], [address]  [place] [address]  Phone: [phone]  Fax: [phone]  Follow Up Time: 1 month

## 2021-03-11 LAB
ANION GAP SERPL CALC-SCNC: 18 MMOL/L — HIGH (ref 5–17)
BUN SERPL-MCNC: 22 MG/DL — SIGNIFICANT CHANGE UP (ref 7–23)
CALCIUM SERPL-MCNC: 10.7 MG/DL — HIGH (ref 8.4–10.5)
CHLORIDE SERPL-SCNC: 90 MMOL/L — LOW (ref 96–108)
CO2 SERPL-SCNC: 23 MMOL/L — SIGNIFICANT CHANGE UP (ref 22–31)
CREAT SERPL-MCNC: 0.54 MG/DL — SIGNIFICANT CHANGE UP (ref 0.5–1.3)
GLUCOSE BLDC GLUCOMTR-MCNC: 181 MG/DL — HIGH (ref 70–99)
GLUCOSE BLDC GLUCOMTR-MCNC: 205 MG/DL — HIGH (ref 70–99)
GLUCOSE BLDC GLUCOMTR-MCNC: 223 MG/DL — HIGH (ref 70–99)
GLUCOSE BLDC GLUCOMTR-MCNC: 224 MG/DL — HIGH (ref 70–99)
GLUCOSE SERPL-MCNC: 205 MG/DL — HIGH (ref 70–99)
HCT VFR BLD CALC: 48.2 % — SIGNIFICANT CHANGE UP (ref 39–50)
HGB BLD-MCNC: 16.6 G/DL — SIGNIFICANT CHANGE UP (ref 13–17)
MCHC RBC-ENTMCNC: 30.4 PG — SIGNIFICANT CHANGE UP (ref 27–34)
MCHC RBC-ENTMCNC: 34.4 GM/DL — SIGNIFICANT CHANGE UP (ref 32–36)
MCV RBC AUTO: 88.3 FL — SIGNIFICANT CHANGE UP (ref 80–100)
NRBC # BLD: 0 /100 WBCS — SIGNIFICANT CHANGE UP (ref 0–0)
PLATELET # BLD AUTO: 244 K/UL — SIGNIFICANT CHANGE UP (ref 150–400)
POTASSIUM SERPL-MCNC: 3.9 MMOL/L — SIGNIFICANT CHANGE UP (ref 3.5–5.3)
POTASSIUM SERPL-SCNC: 3.9 MMOL/L — SIGNIFICANT CHANGE UP (ref 3.5–5.3)
RBC # BLD: 5.46 M/UL — SIGNIFICANT CHANGE UP (ref 4.2–5.8)
RBC # FLD: 12.5 % — SIGNIFICANT CHANGE UP (ref 10.3–14.5)
SODIUM SERPL-SCNC: 131 MMOL/L — LOW (ref 135–145)
WBC # BLD: 9.98 K/UL — SIGNIFICANT CHANGE UP (ref 3.8–10.5)
WBC # FLD AUTO: 9.98 K/UL — SIGNIFICANT CHANGE UP (ref 3.8–10.5)

## 2021-03-11 PROCEDURE — 99233 SBSQ HOSP IP/OBS HIGH 50: CPT

## 2021-03-11 RX ORDER — LIDOCAINE 4 G/100G
1 CREAM TOPICAL ONCE
Refills: 0 | Status: COMPLETED | OUTPATIENT
Start: 2021-03-11 | End: 2021-03-12

## 2021-03-11 RX ORDER — TRAMADOL HYDROCHLORIDE 50 MG/1
50 TABLET ORAL ONCE
Refills: 0 | Status: DISCONTINUED | OUTPATIENT
Start: 2021-03-11 | End: 2021-03-11

## 2021-03-11 RX ORDER — TRAMADOL HYDROCHLORIDE 50 MG/1
25 TABLET ORAL EVERY 6 HOURS
Refills: 0 | Status: DISCONTINUED | OUTPATIENT
Start: 2021-03-11 | End: 2021-03-13

## 2021-03-11 RX ORDER — ACETAMINOPHEN 500 MG
650 TABLET ORAL EVERY 6 HOURS
Refills: 0 | Status: DISCONTINUED | OUTPATIENT
Start: 2021-03-11 | End: 2021-03-11

## 2021-03-11 RX ORDER — JNJ-78436735 50000000000 [PFU]/.5ML
0.5 SUSPENSION INTRAMUSCULAR ONCE
Refills: 0 | Status: DISCONTINUED | OUTPATIENT
Start: 2021-03-11 | End: 2021-03-11

## 2021-03-11 RX ORDER — TRAMADOL HYDROCHLORIDE 50 MG/1
50 TABLET ORAL EVERY 6 HOURS
Refills: 0 | Status: DISCONTINUED | OUTPATIENT
Start: 2021-03-11 | End: 2021-03-13

## 2021-03-11 RX ORDER — JNJ-78436735 50000000000 [PFU]/.5ML
0.5 SUSPENSION INTRAMUSCULAR ONCE
Refills: 0 | Status: DISCONTINUED | OUTPATIENT
Start: 2021-03-12 | End: 2021-03-12

## 2021-03-11 RX ADMIN — Medication 4: at 08:33

## 2021-03-11 RX ADMIN — SENNA PLUS 2 TABLET(S): 8.6 TABLET ORAL at 21:55

## 2021-03-11 RX ADMIN — Medication 90 MILLIGRAM(S): at 06:07

## 2021-03-11 RX ADMIN — Medication 4: at 12:35

## 2021-03-11 RX ADMIN — INSULIN GLARGINE 8 UNIT(S): 100 INJECTION, SOLUTION SUBCUTANEOUS at 21:55

## 2021-03-11 RX ADMIN — Medication 650 MILLIGRAM(S): at 06:07

## 2021-03-11 RX ADMIN — Medication 1 MILLIGRAM(S): at 12:37

## 2021-03-11 RX ADMIN — Medication 0.2 MILLIGRAM(S): at 17:57

## 2021-03-11 RX ADMIN — Medication 100 MILLIGRAM(S): at 12:37

## 2021-03-11 RX ADMIN — Medication 1 PATCH: at 11:24

## 2021-03-11 RX ADMIN — TRAMADOL HYDROCHLORIDE 50 MILLIGRAM(S): 50 TABLET ORAL at 16:09

## 2021-03-11 RX ADMIN — Medication 5 UNIT(S): at 08:32

## 2021-03-11 RX ADMIN — Medication 1 PATCH: at 12:37

## 2021-03-11 RX ADMIN — Medication 5 UNIT(S): at 17:57

## 2021-03-11 RX ADMIN — Medication 650 MILLIGRAM(S): at 06:30

## 2021-03-11 RX ADMIN — Medication 0.1 MILLIGRAM(S): at 06:07

## 2021-03-11 RX ADMIN — ATORVASTATIN CALCIUM 80 MILLIGRAM(S): 80 TABLET, FILM COATED ORAL at 21:55

## 2021-03-11 RX ADMIN — Medication 2: at 21:55

## 2021-03-11 RX ADMIN — Medication 25 MILLIGRAM(S): at 06:07

## 2021-03-11 RX ADMIN — Medication 4: at 17:57

## 2021-03-11 RX ADMIN — POLYETHYLENE GLYCOL 3350 17 GRAM(S): 17 POWDER, FOR SOLUTION ORAL at 06:08

## 2021-03-11 RX ADMIN — ENOXAPARIN SODIUM 40 MILLIGRAM(S): 100 INJECTION SUBCUTANEOUS at 17:58

## 2021-03-11 RX ADMIN — TRAMADOL HYDROCHLORIDE 50 MILLIGRAM(S): 50 TABLET ORAL at 09:26

## 2021-03-11 RX ADMIN — POLYETHYLENE GLYCOL 3350 17 GRAM(S): 17 POWDER, FOR SOLUTION ORAL at 17:57

## 2021-03-11 RX ADMIN — LOSARTAN POTASSIUM 100 MILLIGRAM(S): 100 TABLET, FILM COATED ORAL at 06:07

## 2021-03-11 RX ADMIN — Medication 1 TABLET(S): at 12:51

## 2021-03-11 RX ADMIN — Medication 5 UNIT(S): at 12:35

## 2021-03-11 RX ADMIN — Medication 1 PATCH: at 07:00

## 2021-03-11 NOTE — PROGRESS NOTE ADULT - SUBJECTIVE AND OBJECTIVE BOX
Subjective: Patient seen and examined. No new events except as noted.   moved out of Stroke unit   Hypertensive    REVIEW OF SYSTEMS:    CONSTITUTIONAL: + weakness, fevers or chills  EYES/ENT: No visual changes;  No vertigo or throat pain   NECK: No pain or stiffness  RESPIRATORY: No cough, wheezing, hemoptysis; No shortness of breath  CARDIOVASCULAR: No chest pain or palpitations  GASTROINTESTINAL: No abdominal or epigastric pain. No nausea, vomiting, or hematemesis; No diarrhea or constipation. No melena or hematochezia.  GENITOURINARY: No dysuria, frequency or hematuria  NEUROLOGICAL: No numbness or weakness  SKIN: No itching, burning, rashes, or lesions   All other review of systems is negative unless indicated above.    MEDICATIONS:  MEDICATIONS  (STANDING):  atorvastatin 80 milliGRAM(s) Oral at bedtime  cloNIDine 0.1 milliGRAM(s) Oral two times a day  dextrose 40% Gel 15 Gram(s) Oral once  dextrose 50% Injectable 25 Gram(s) IV Push once  dextrose 50% Injectable 12.5 Gram(s) IV Push once  dextrose 50% Injectable 25 Gram(s) IV Push once  enoxaparin Injectable 40 milliGRAM(s) SubCutaneous <User Schedule>  folic acid 1 milliGRAM(s) Oral daily  glucagon  Injectable 1 milliGRAM(s) IntraMuscular once  hydrochlorothiazide 25 milliGRAM(s) Oral daily  influenza   Vaccine 0.5 milliLiter(s) IntraMuscular once  insulin glargine Injectable (LANTUS) 8 Unit(s) SubCutaneous at bedtime  insulin lispro (ADMELOG) corrective regimen sliding scale   SubCutaneous Before meals and at bedtime  insulin lispro Injectable (ADMELOG) 5 Unit(s) SubCutaneous three times a day before meals  losartan 100 milliGRAM(s) Oral daily  multivitamin 1 Tablet(s) Oral daily  nicotine -  14 mG/24Hr(s) Patch 1 patch Transdermal daily  NIFEdipine XL 90 milliGRAM(s) Oral daily  polyethylene glycol 3350 17 Gram(s) Oral two times a day  senna 2 Tablet(s) Oral at bedtime  thiamine 100 milliGRAM(s) Oral daily      PHYSICAL EXAM:  T(C): 36.7 (03-11-21 @ 07:53), Max: 37.1 (03-10-21 @ 20:16)  HR: 79 (03-11-21 @ 07:53) (72 - 92)  BP: 145/94 (03-11-21 @ 07:53) (131/84 - 189/146)  RR: 19 (03-11-21 @ 07:53) (19 - 21)  SpO2: 96% (03-11-21 @ 07:53) (91% - 98%)  Wt(kg): --  I&O's Summary    10 Mar 2021 07:01  -  11 Mar 2021 07:00  --------------------------------------------------------  IN: 0 mL / OUT: 1500 mL / NET: -1500 mL        Appearance: NAD  HEENT:   Dry oral mucosa, PERRL, EOMI	  Lymphatic: No lymphadenopathy  Cardiovascular: Normal S1 S2, No JVD, No murmurs, No edema  Respiratory: Lungs clear to auscultation	  Psychiatry: A & O x 3, Mood & affect appropriate  Gastrointestinal:  Soft, Non-tender, + BS	  Skin: No rashes, No ecchymoses, No cyanosis	  NEURO EXAM:  Alert, oriented, speech fluent, no dysarthria, follows commands, EOMI, no facial asymmetry, mild LUE drift, otherwise 5/5 strength throughout, sensation symmetric to light touch, no dysmetria  Extremities: Normal range of motion, No clubbing, cyanosis or edema  Vascular: Peripheral pulses palpable 2+ bilaterally            LABS:    CARDIAC MARKERS:                                16.6   9.98  )-----------( 244      ( 11 Mar 2021 05:37 )             48.2     03-11    131<L>  |  90<L>  |  22  ----------------------------<  205<H>  3.9   |  23  |  0.54    Ca    10.7<H>      11 Mar 2021 05:37      proBNP:   Lipid Profile:   HgA1c:   TSH:             TELEMETRY: 	SR     ECG:  	  RADIOLOGY:   DIAGNOSTIC TESTING:  [ ] Echocardiogram:  [ ]  Catheterization:  [ ] Stress Test:    OTHER:

## 2021-03-11 NOTE — PROGRESS NOTE ADULT - ASSESSMENT
57 y/o M presented with headache and imbalance, CTH showed R BG/thalamic hemorrhage and IVH likely hypertensive in etiology.     Impression:  R BG/thalamic hemorrhage and IVH likely due to chronic hypertension    NEURO: Continue close monitoring for neurologic deterioration, - started on high dose statin, patient had cerebral angiogram on 03/08 by Dr. Rainey which was negative,  plan for MRI Brain w/o in 4-6 weeks ( 04/04-04/16) to rule out any underlying vascular malformation. Physical therapy/OT recommended AR.    ANTITHROMBOTIC THERAPY: none due to hemorrhage    PULMONARY: CXR clear, protecting airway, saturating well     CARDIOVASCULAR: TTE: EF 75-80%, normal left atrium, cardiac monitoring, cardiology consulted for BP control                             SBP goal: SBP < 160    GASTROINTESTINAL:  dysphagia screen- passed, tolerating diet       Diet: Regular    RENAL: BUN/Cr without acute change, good urine output      Na Goal: Greater than 135     [place]: N    HEMATOLOGY: H/H without acute change, Platelets 244     DVT ppx: Heparin s.c [] LMWH [x]     ID: afebrile, no leukocytosis, no si/sx of infection    OTHER: Plan of care discussed with patient at bedside    DISPOSITION: AR once bed available    CORE MEASURES:        Admission NIHSS: 0     TPA: [] YES [x] NO      LDL/HDL: 141/ 54     Depression Screen: 0     Statin Therapy: Y     Dysphagia Screen: [x] PASS [] FAIL     Smoking [x] YES [] NO      Afib [] YES [x] NO    Stroke Education [x] YES [] NO    Obtain screening lower extremity venous ultrasound in patients who meet 1 or more of the following criteria as patient is high risk for DVT/PE on admission:   [] History of DVT/PE  []Hypercoagulable states (Factor V Leiden, Cancer, OCP, etc. )  []Prolonged immobility (hemiplegia/hemiparesis/post operative or any other extended immobilization)  [] Transferred from outside facility (Rehab or Long term care)  [x] Age </= to 50. 55 y/o M presented with headache and imbalance, CTH showed R BG/thalamic hemorrhage and IVH likely hypertensive in etiology.     Impression:  R BG/thalamic hemorrhage and IVH likely due to chronic hypertension    NEURO: Neurologically without change, Continue monitoring for neurologic deterioration, - started on high dose statin, patient had cerebral angiogram on 03/08 by Dr. Rainey which was negative,  plan for MRI Brain w/o in 4-6 weeks ( 04/04-04/16) to rule out any underlying vascular malformation. Physical therapy/OT recommended AR.    ANTITHROMBOTIC THERAPY: none due to hemorrhage    PULMONARY: CXR clear, protecting airway, saturating well     CARDIOVASCULAR: TTE: EF 75-80%, normal left atrium, cardiac monitoring, cardiology consulted for BP control                             SBP goal: SBP < 160    GASTROINTESTINAL:  dysphagia screen- passed, tolerating diet       Diet: Regular    RENAL: BUN/Cr without acute change, good urine output      Na Goal: Greater than 135     [place]: N    HEMATOLOGY: H/H without acute change, Platelets 244     DVT ppx: Heparin s.c [] LMWH [x]     ID: afebrile, no leukocytosis, no si/sx of infection    OTHER: Plan of care discussed with patient at bedside    DISPOSITION: AR once bed available    CORE MEASURES:        Admission NIHSS: 0     TPA: [] YES [x] NO      LDL/HDL: 141/ 54     Depression Screen: 0     Statin Therapy: Y     Dysphagia Screen: [x] PASS [] FAIL     Smoking [x] YES [] NO      Afib [] YES [x] NO    Stroke Education [x] YES [] NO    Obtain screening lower extremity venous ultrasound in patients who meet 1 or more of the following criteria as patient is high risk for DVT/PE on admission:   [] History of DVT/PE  []Hypercoagulable states (Factor V Leiden, Cancer, OCP, etc. )  []Prolonged immobility (hemiplegia/hemiparesis/post operative or any other extended immobilization)  [] Transferred from outside facility (Rehab or Long term care)  [x] Age </= to 50.

## 2021-03-11 NOTE — PROGRESS NOTE ADULT - SUBJECTIVE AND OBJECTIVE BOX
THE PATIENT WAS SEEN AND EXAMINED BY ME WITH THE HOUSESTAFF AND STROKE TEAM DURING MORNING ROUNDS.   HPI:  56M here w/ headache, imbalance, fall from 1400 3/6. As per patient, had posterior distribution headache yesterday, moderate intensity. Felt dizzy, imbalanced, fell. Went to urgent care, found to have high BP prior to admission. Transferred from [facility] to stroke service for further workup. NIHSS: 0, MRS: 0, ICH: 2.    ROS: Otherwise negative.    SUBJECTIVE: No events overnight.  No new neurologic complaints.       acetaminophen   Tablet .. 650 milliGRAM(s) Oral every 6 hours PRN  acetaminophen   Tablet .. 650 milliGRAM(s) Oral every 6 hours PRN  aluminum hydroxide/magnesium hydroxide/simethicone Suspension 30 milliLiter(s) Oral every 4 hours PRN  atorvastatin 80 milliGRAM(s) Oral at bedtime  cloNIDine 0.1 milliGRAM(s) Oral two times a day  dextrose 40% Gel 15 Gram(s) Oral once  dextrose 50% Injectable 25 Gram(s) IV Push once  dextrose 50% Injectable 12.5 Gram(s) IV Push once  dextrose 50% Injectable 25 Gram(s) IV Push once  enoxaparin Injectable 40 milliGRAM(s) SubCutaneous <User Schedule>  folic acid 1 milliGRAM(s) Oral daily  glucagon  Injectable 1 milliGRAM(s) IntraMuscular once  hydrochlorothiazide 25 milliGRAM(s) Oral daily  influenza   Vaccine 0.5 milliLiter(s) IntraMuscular once  insulin glargine Injectable (LANTUS) 8 Unit(s) SubCutaneous at bedtime  insulin lispro (ADMELOG) corrective regimen sliding scale   SubCutaneous Before meals and at bedtime  insulin lispro Injectable (ADMELOG) 5 Unit(s) SubCutaneous three times a day before meals  losartan 100 milliGRAM(s) Oral daily  multivitamin 1 Tablet(s) Oral daily  nicotine -  14 mG/24Hr(s) Patch 1 patch Transdermal daily  NIFEdipine XL 90 milliGRAM(s) Oral daily  polyethylene glycol 3350 17 Gram(s) Oral two times a day  senna 2 Tablet(s) Oral at bedtime  thiamine 100 milliGRAM(s) Oral daily    PHYSICAL EXAM:   Vital Signs Last 24 Hrs  T(C): 36.7 (10 Mar 2021 23:30), Max: 37.1 (10 Mar 2021 20:16)  T(F): 98.1 (10 Mar 2021 23:30), Max: 98.7 (10 Mar 2021 20:16)  HR: 76 (10 Mar 2021 23:30) (72 - 94)  BP: 150/88 (10 Mar 2021 23:30) (131/84 - 160/106)  BP(mean): 118 (10 Mar 2021 16:00) (99 - 120)  RR: 20 (10 Mar 2021 23:30) (17 - 23)  SpO2: 91% (10 Mar 2021 23:30) (91% - 99%)     ID:     General: No acute distress  HEENT: EOM intact, visual fields full  Abdomen: Soft, nontender, nondistended   Extremities: No edema    NEUROLOGICAL EXAM:  Mental status: Awake, alert, oriented x3, speech fluent, follows commands, no neglect  Cranial Nerves: No facial asymmetry, no nystagmus, no dysarthria,  tongue midline  Motor exam: Normal tone, no drift, 5/5 RUE, 5/5 RLE, 4/5 LUE, 4/5 LLE, normal fine finger movements.  Sensation: Intact to light touch   Coordination/ Gait: No dysmetria, THIERNO intact and symmetric bilaterally    LABS:                        16.6   9.98  )-----------( 244      ( 11 Mar 2021 05:37 )             48.2    03-10    134<L>  |  97  |  17  ----------------------------<  202<H>  3.4<L>   |  23  |  0.53    Ca    10.1      10 Mar 2021 05:37    IMAGING: Reviewed by me.     CT Head (03/09/21): Slightly decreased size of right thalamic hemorrhage. Grossly stable intraventricular hemorrhage.    IR Neuro:  Diagnostic cerebral angiogram demonstrating diffuse mild intracranial atherosclerotic disease without significant stenosis, and no evidence of aneurysm, arteriovenous malformation or fistula.    CT Head/ CTA H/N  (03/07/21):   Acute right thalamic hemorrhage measuring up to 2.4 cm with intraventricular extension. There is no midline shift.  Mild ventriculomegaly is suggested. Cannot rule out developing hydrocephalus.  Age-indeterminate infarcts involving bilateral basal ganglia and thalami.   THE PATIENT WAS SEEN AND EXAMINED BY ME WITH THE HOUSESTAFF AND STROKE TEAM DURING MORNING ROUNDS.   HPI:  56M here w/ headache, imbalance, fall from 1400 3/6. As per patient, had posterior distribution headache yesterday, moderate intensity. Felt dizzy, imbalanced, fell. Went to urgent care, found to have high BP prior to admission. [facility] [place] [facility] service for further workup. NIHSS: 0, MRS: 0, ICH: 2.    ROS: Otherwise negative.    SUBJECTIVE: No events overnight.  No new neurologic complaints.        ID: [id number]    acetaminophen   Tablet .. 650 milliGRAM(s) Oral every 6 hours PRN  acetaminophen   Tablet .. 650 milliGRAM(s) Oral every 6 hours PRN  aluminum hydroxide/magnesium hydroxide/simethicone Suspension 30 milliLiter(s) Oral every 4 hours PRN  atorvastatin 80 milliGRAM(s) Oral at bedtime  cloNIDine 0.1 milliGRAM(s) Oral two times a day  dextrose 40% Gel 15 Gram(s) Oral once  dextrose 50% Injectable 25 Gram(s) IV Push once  dextrose 50% Injectable 12.5 Gram(s) IV Push once  dextrose 50% Injectable 25 Gram(s) IV Push once  enoxaparin Injectable 40 milliGRAM(s) SubCutaneous <User Schedule>  folic acid 1 milliGRAM(s) Oral daily  glucagon  Injectable 1 milliGRAM(s) IntraMuscular once  hydrochlorothiazide 25 milliGRAM(s) Oral daily  influenza   Vaccine 0.5 milliLiter(s) IntraMuscular once  insulin glargine Injectable (LANTUS) 8 Unit(s) SubCutaneous at bedtime  insulin lispro (ADMELOG) corrective regimen sliding scale   SubCutaneous Before meals and at bedtime  insulin lispro Injectable (ADMELOG) 5 Unit(s) SubCutaneous three times a day before meals  losartan 100 milliGRAM(s) Oral daily  multivitamin 1 Tablet(s) Oral daily  nicotine -  14 mG/24Hr(s) Patch 1 patch Transdermal daily  NIFEdipine XL 90 milliGRAM(s) Oral daily  polyethylene glycol 3350 17 Gram(s) Oral two times a day  senna 2 Tablet(s) Oral at bedtime  thiamine 100 milliGRAM(s) Oral daily    PHYSICAL EXAM:   Vital Signs Last 24 Hrs  T(C): 36.7 (10 Mar 2021 23:30), Max: 37.1 (10 Mar 2021 20:16)  T(F): 98.1 (10 Mar 2021 23:30), Max: 98.7 (10 Mar 2021 20:16)  HR: 76 (10 Mar 2021 23:30) (72 - 94)  BP: 150/88 (10 Mar 2021 23:30) (131/84 - 160/106)  BP(mean): 118 (10 Mar 2021 16:00) (99 - 120)  RR: 20 (10 Mar 2021 23:30) (17 - 23)  SpO2: 91% (10 Mar 2021 23:30) (91% - 99%)    General: No acute distress  HEENT: EOM intact, visual fields full  Abdomen: Soft, nontender, nondistended   Extremities: No edema    NEUROLOGICAL EXAM:  Mental status: Awake, alert, oriented x3, speech fluent, follows commands, no neglect  Cranial Nerves: No facial asymmetry, no nystagmus, no dysarthria,  tongue midline  Motor exam: Normal tone, no drift, 5/5 RUE, 5/5 RLE, 4/5 LUE, 4/5 LLE, normal fine finger movements.  Sensation: Intact to light touch   Coordination/ Gait: ataxic gait with walker    LABS:                        16.6   9.98  )-----------( 244      ( 11 Mar 2021 05:37 )             48.2    03-10    134<L>  |  97  |  17  ----------------------------<  202<H>  3.4<L>   |  23  |  0.53    Ca    10.1      10 Mar 2021 05:37    IMAGING: Reviewed by me.     CT Head (03/09/21): Slightly decreased size of right thalamic hemorrhage. Grossly stable intraventricular hemorrhage.    IR Neuro:  Diagnostic cerebral angiogram demonstrating diffuse mild intracranial atherosclerotic disease without significant stenosis, and no evidence of aneurysm, arteriovenous malformation or fistula.    CT Head/ CTA H/N  (03/07/21):   Acute right thalamic hemorrhage measuring up to 2.4 cm with intraventricular extension. There is no midline shift.  Mild ventriculomegaly is suggested. Cannot rule out developing hydrocephalus.  Age-indeterminate infarcts involving bilateral basal ganglia and thalami.

## 2021-03-11 NOTE — PROGRESS NOTE ADULT - SUBJECTIVE AND OBJECTIVE BOX
patient seen, reports feeling "so so," no new weakness  states he wants to go home    REVIEW OF SYSTEMS  Constitutional - No fever,  No fatigue  HEENT - No vertigo, No neck pain  Neurological - No headaches, No memory loss, No loss of strength, No numbness, No tremors  Skin - No rashes, No lesions   Musculoskeletal - No joint pain, No joint swelling, No muscle pain  Psychiatric - No depression, No anxiety    FUNCTIONAL PROGRESS  3/10 SLP  cognitive linguistic deficits  executive function deficits suspected    3/10 PT  transfers close supervision  gait contact guard x 18 feet, RW    3/10 OT  ambulatory transfer to toilet, CG/min assist  poor awareness of left leg      VITALS  T(C): 36.7 (03-11-21 @ 07:53), Max: 37.1 (03-10-21 @ 20:16)  HR: 79 (03-11-21 @ 07:53) (72 - 92)  BP: 145/94 (03-11-21 @ 07:53) (131/84 - 189/146)  RR: 19 (03-11-21 @ 07:53) (17 - 21)  SpO2: 96% (03-11-21 @ 07:53) (91% - 98%)  Wt(kg): --    MEDICATIONS   acetaminophen   Tablet .. 650 milliGRAM(s) every 6 hours PRN  acetaminophen   Tablet .. 650 milliGRAM(s) every 6 hours PRN  aluminum hydroxide/magnesium hydroxide/simethicone Suspension 30 milliLiter(s) every 4 hours PRN  atorvastatin 80 milliGRAM(s) at bedtime  cloNIDine 0.1 milliGRAM(s) two times a day  dextrose 40% Gel 15 Gram(s) once  dextrose 50% Injectable 25 Gram(s) once  dextrose 50% Injectable 12.5 Gram(s) once  dextrose 50% Injectable 25 Gram(s) once  enoxaparin Injectable 40 milliGRAM(s) <User Schedule>  folic acid 1 milliGRAM(s) daily  glucagon  Injectable 1 milliGRAM(s) once  hydrochlorothiazide 25 milliGRAM(s) daily  influenza   Vaccine 0.5 milliLiter(s) once  insulin glargine Injectable (LANTUS) 8 Unit(s) at bedtime  insulin lispro (ADMELOG) corrective regimen sliding scale   Before meals and at bedtime  insulin lispro Injectable (ADMELOG) 5 Unit(s) three times a day before meals  losartan 100 milliGRAM(s) daily  multivitamin 1 Tablet(s) daily  nicotine -  14 mG/24Hr(s) Patch 1 patch daily  NIFEdipine XL 90 milliGRAM(s) daily  polyethylene glycol 3350 17 Gram(s) two times a day  senna 2 Tablet(s) at bedtime  thiamine 100 milliGRAM(s) daily      RECENT LABS - Reviewed                        16.6   9.98  )-----------( 244      ( 11 Mar 2021 05:37 )             48.2     03-11    131<L>  |  90<L>  |  22  ----------------------------<  205<H>  3.9   |  23  |  0.54    Ca    10.7<H>      11 Mar 2021 05:37        ----------------------------------------------------------------------------------------  PHYSICAL EXAM  Constitutional - NAD, Comfortable, laying in bed   HEENT - NCAT, EOMI  Neck - Supple, No limited ROM  Chest - Breathing comfortably  Cardiovascular - S1S2   Abdomen - Soft   Extremities - No C/C/E, No calf tenderness   Neurologic Exam -                    Cognitive - Awake, Alert, AAO to self, place, date, year, situation     Communication - Fluent, No dysarthria     Cranial Nerves - no facial assymetry     Motor - mild left sided weakness, +pronator drift      Sensory - Intact to LT     Psychiatric - Mood stable, Affect WNL  ----------------------------------------------------------------------------------------  ASSESSMENT/PLAN  56 year old man with functional deficits after right BG/thalamic hemorrhage, IVH with gait impairment  s/p angio on 3/8, negative   CTH stable  Pain - Tylenol  DVT PPX - SCDs lovenox   Rehab - Will continue to follow for ongoing rehab needs and recommendations.   continue bedside therapy, PT/OT  patient requires CG/min assist with transfers 3/10  PT/OT follow up today, if no improvement, then would benefit from short stay at inpatient rehabilitation vs Home with Home care/outpatient therapy    Recommend ACUTE inpatient rehabilitation for the functional deficits consisting of 3 hours of therapy/day & 24 hour RN/daily PMR physician for comorbid medical management. Patient will be able to tolerate 3 hours a day.

## 2021-03-12 PROBLEM — I25.10 ATHEROSCLEROTIC HEART DISEASE OF NATIVE CORONARY ARTERY WITHOUT ANGINA PECTORIS: Chronic | Status: ACTIVE | Noted: 2021-03-07

## 2021-03-12 PROBLEM — I10 ESSENTIAL (PRIMARY) HYPERTENSION: Chronic | Status: ACTIVE | Noted: 2021-03-07

## 2021-03-12 LAB
GLUCOSE BLDC GLUCOMTR-MCNC: 154 MG/DL — HIGH (ref 70–99)
GLUCOSE BLDC GLUCOMTR-MCNC: 201 MG/DL — HIGH (ref 70–99)
GLUCOSE BLDC GLUCOMTR-MCNC: 208 MG/DL — HIGH (ref 70–99)
GLUCOSE BLDC GLUCOMTR-MCNC: 402 MG/DL — HIGH (ref 70–99)

## 2021-03-12 PROCEDURE — 72131 CT LUMBAR SPINE W/O DYE: CPT | Mod: 26

## 2021-03-12 RX ORDER — ACETAMINOPHEN 500 MG
650 TABLET ORAL EVERY 6 HOURS
Refills: 0 | Status: DISCONTINUED | OUTPATIENT
Start: 2021-03-13 | End: 2021-03-18

## 2021-03-12 RX ORDER — HYDROCHLOROTHIAZIDE 25 MG
25 TABLET ORAL ONCE
Refills: 0 | Status: COMPLETED | OUTPATIENT
Start: 2021-03-12 | End: 2021-03-12

## 2021-03-12 RX ORDER — LOSARTAN/HYDROCHLOROTHIAZIDE 100MG-25MG
1 TABLET ORAL
Qty: 0 | Refills: 0 | DISCHARGE

## 2021-03-12 RX ORDER — NICOTINE POLACRILEX 2 MG
1 GUM BUCCAL
Qty: 0 | Refills: 0 | DISCHARGE
Start: 2021-03-12

## 2021-03-12 RX ORDER — DEXTROSE 50 % IN WATER 50 %
25 SYRINGE (ML) INTRAVENOUS ONCE
Refills: 0 | Status: DISCONTINUED | OUTPATIENT
Start: 2021-03-13 | End: 2021-03-31

## 2021-03-12 RX ORDER — THIAMINE MONONITRATE (VIT B1) 100 MG
100 TABLET ORAL DAILY
Refills: 0 | Status: DISCONTINUED | OUTPATIENT
Start: 2021-03-13 | End: 2021-03-31

## 2021-03-12 RX ORDER — JNJ-78436735 50000000000 [PFU]/.5ML
0.5 SUSPENSION INTRAMUSCULAR ONCE
Refills: 0 | Status: COMPLETED | OUTPATIENT
Start: 2021-03-12 | End: 2021-03-12

## 2021-03-12 RX ORDER — AMLODIPINE BESYLATE 2.5 MG/1
1 TABLET ORAL
Qty: 0 | Refills: 0 | DISCHARGE

## 2021-03-12 RX ORDER — TRAMADOL HYDROCHLORIDE 50 MG/1
50 TABLET ORAL EVERY 6 HOURS
Refills: 0 | Status: DISCONTINUED | OUTPATIENT
Start: 2021-03-13 | End: 2021-03-18

## 2021-03-12 RX ORDER — SENNA PLUS 8.6 MG/1
2 TABLET ORAL AT BEDTIME
Refills: 0 | Status: DISCONTINUED | OUTPATIENT
Start: 2021-03-13 | End: 2021-03-17

## 2021-03-12 RX ORDER — TRAMADOL HYDROCHLORIDE 50 MG/1
0.5 TABLET ORAL
Qty: 0 | Refills: 0 | DISCHARGE
Start: 2021-03-12

## 2021-03-12 RX ORDER — DEXTROSE 50 % IN WATER 50 %
12.5 SYRINGE (ML) INTRAVENOUS ONCE
Refills: 0 | Status: DISCONTINUED | OUTPATIENT
Start: 2021-03-13 | End: 2021-03-31

## 2021-03-12 RX ORDER — NIFEDIPINE 30 MG
1 TABLET, EXTENDED RELEASE 24 HR ORAL
Qty: 0 | Refills: 0 | DISCHARGE
Start: 2021-03-12

## 2021-03-12 RX ORDER — SODIUM CHLORIDE 9 MG/ML
1000 INJECTION, SOLUTION INTRAVENOUS
Refills: 0 | Status: DISCONTINUED | OUTPATIENT
Start: 2021-03-13 | End: 2021-03-31

## 2021-03-12 RX ORDER — ENOXAPARIN SODIUM 100 MG/ML
40 INJECTION SUBCUTANEOUS
Qty: 0 | Refills: 0 | DISCHARGE
Start: 2021-03-12

## 2021-03-12 RX ORDER — INSULIN LISPRO 100/ML
VIAL (ML) SUBCUTANEOUS
Refills: 0 | Status: DISCONTINUED | OUTPATIENT
Start: 2021-03-12 | End: 2021-03-13

## 2021-03-12 RX ORDER — FOLIC ACID 0.8 MG
1 TABLET ORAL
Qty: 0 | Refills: 0 | DISCHARGE
Start: 2021-03-12

## 2021-03-12 RX ORDER — METFORMIN HYDROCHLORIDE 850 MG/1
1 TABLET ORAL
Qty: 0 | Refills: 0 | DISCHARGE

## 2021-03-12 RX ORDER — NICOTINE POLACRILEX 2 MG
1 GUM BUCCAL DAILY
Refills: 0 | Status: DISCONTINUED | OUTPATIENT
Start: 2021-03-13 | End: 2021-03-30

## 2021-03-12 RX ORDER — INSULIN LISPRO 100/ML
VIAL (ML) SUBCUTANEOUS AT BEDTIME
Refills: 0 | Status: DISCONTINUED | OUTPATIENT
Start: 2021-03-12 | End: 2021-03-13

## 2021-03-12 RX ORDER — ATORVASTATIN CALCIUM 80 MG/1
1 TABLET, FILM COATED ORAL
Qty: 0 | Refills: 0 | DISCHARGE
Start: 2021-03-12

## 2021-03-12 RX ORDER — INSULIN LISPRO 100/ML
5 VIAL (ML) SUBCUTANEOUS
Qty: 0 | Refills: 0 | DISCHARGE
Start: 2021-03-12

## 2021-03-12 RX ORDER — ASPIRIN/CALCIUM CARB/MAGNESIUM 324 MG
1 TABLET ORAL
Qty: 0 | Refills: 0 | DISCHARGE

## 2021-03-12 RX ORDER — INSULIN GLARGINE 100 [IU]/ML
8 INJECTION, SOLUTION SUBCUTANEOUS
Qty: 0 | Refills: 0 | DISCHARGE
Start: 2021-03-12

## 2021-03-12 RX ORDER — GLUCAGON INJECTION, SOLUTION 0.5 MG/.1ML
1 INJECTION, SOLUTION SUBCUTANEOUS ONCE
Refills: 0 | Status: DISCONTINUED | OUTPATIENT
Start: 2021-03-13 | End: 2021-03-31

## 2021-03-12 RX ORDER — DEXTROSE 50 % IN WATER 50 %
15 SYRINGE (ML) INTRAVENOUS ONCE
Refills: 0 | Status: DISCONTINUED | OUTPATIENT
Start: 2021-03-13 | End: 2021-03-31

## 2021-03-12 RX ORDER — ATORVASTATIN CALCIUM 80 MG/1
80 TABLET, FILM COATED ORAL AT BEDTIME
Refills: 0 | Status: DISCONTINUED | OUTPATIENT
Start: 2021-03-13 | End: 2021-03-31

## 2021-03-12 RX ORDER — POLYETHYLENE GLYCOL 3350 17 G/17G
17 POWDER, FOR SOLUTION ORAL
Qty: 0 | Refills: 0 | DISCHARGE
Start: 2021-03-12

## 2021-03-12 RX ORDER — SENNA PLUS 8.6 MG/1
2 TABLET ORAL
Qty: 0 | Refills: 0 | DISCHARGE
Start: 2021-03-12

## 2021-03-12 RX ORDER — LOSARTAN POTASSIUM 100 MG/1
1 TABLET, FILM COATED ORAL
Qty: 0 | Refills: 0 | DISCHARGE
Start: 2021-03-12

## 2021-03-12 RX ORDER — FOLIC ACID 0.8 MG
1 TABLET ORAL DAILY
Refills: 0 | Status: DISCONTINUED | OUTPATIENT
Start: 2021-03-13 | End: 2021-03-31

## 2021-03-12 RX ORDER — ENOXAPARIN SODIUM 100 MG/ML
40 INJECTION SUBCUTANEOUS
Refills: 0 | Status: DISCONTINUED | OUTPATIENT
Start: 2021-03-13 | End: 2021-03-31

## 2021-03-12 RX ORDER — ATORVASTATIN CALCIUM 80 MG/1
1 TABLET, FILM COATED ORAL
Qty: 0 | Refills: 0 | DISCHARGE

## 2021-03-12 RX ORDER — POLYETHYLENE GLYCOL 3350 17 G/17G
17 POWDER, FOR SOLUTION ORAL DAILY
Refills: 0 | Status: DISCONTINUED | OUTPATIENT
Start: 2021-03-13 | End: 2021-03-17

## 2021-03-12 RX ORDER — INSULIN LISPRO 100/ML
VIAL (ML) SUBCUTANEOUS AT BEDTIME
Refills: 0 | Status: DISCONTINUED | OUTPATIENT
Start: 2021-03-13 | End: 2021-03-24

## 2021-03-12 RX ORDER — TRAMADOL HYDROCHLORIDE 50 MG/1
25 TABLET ORAL EVERY 6 HOURS
Refills: 0 | Status: DISCONTINUED | OUTPATIENT
Start: 2021-03-13 | End: 2021-03-18

## 2021-03-12 RX ORDER — IBUPROFEN 200 MG
1 TABLET ORAL
Qty: 0 | Refills: 0 | DISCHARGE

## 2021-03-12 RX ORDER — HYDROCHLOROTHIAZIDE 25 MG
25 TABLET ORAL DAILY
Refills: 0 | Status: DISCONTINUED | OUTPATIENT
Start: 2021-03-12 | End: 2021-03-13

## 2021-03-12 RX ADMIN — Medication 0.2 MILLIGRAM(S): at 05:41

## 2021-03-12 RX ADMIN — Medication 4: at 11:46

## 2021-03-12 RX ADMIN — POLYETHYLENE GLYCOL 3350 17 GRAM(S): 17 POWDER, FOR SOLUTION ORAL at 05:42

## 2021-03-12 RX ADMIN — Medication 0.3 MILLIGRAM(S): at 21:54

## 2021-03-12 RX ADMIN — Medication 1 MILLIGRAM(S): at 11:46

## 2021-03-12 RX ADMIN — TRAMADOL HYDROCHLORIDE 50 MILLIGRAM(S): 50 TABLET ORAL at 16:48

## 2021-03-12 RX ADMIN — Medication 90 MILLIGRAM(S): at 05:41

## 2021-03-12 RX ADMIN — Medication 650 MILLIGRAM(S): at 12:00

## 2021-03-12 RX ADMIN — LIDOCAINE 1 PATCH: 4 CREAM TOPICAL at 01:28

## 2021-03-12 RX ADMIN — Medication 650 MILLIGRAM(S): at 11:44

## 2021-03-12 RX ADMIN — ATORVASTATIN CALCIUM 80 MILLIGRAM(S): 80 TABLET, FILM COATED ORAL at 21:54

## 2021-03-12 RX ADMIN — Medication 1 TABLET(S): at 11:46

## 2021-03-12 RX ADMIN — Medication 0.3 MILLIGRAM(S): at 15:58

## 2021-03-12 RX ADMIN — Medication 1 PATCH: at 11:47

## 2021-03-12 RX ADMIN — Medication 12: at 16:48

## 2021-03-12 RX ADMIN — Medication 5 UNIT(S): at 08:26

## 2021-03-12 RX ADMIN — ENOXAPARIN SODIUM 40 MILLIGRAM(S): 100 INJECTION SUBCUTANEOUS at 16:48

## 2021-03-12 RX ADMIN — Medication 1 PATCH: at 11:46

## 2021-03-12 RX ADMIN — POLYETHYLENE GLYCOL 3350 17 GRAM(S): 17 POWDER, FOR SOLUTION ORAL at 16:48

## 2021-03-12 RX ADMIN — Medication 1 PATCH: at 18:11

## 2021-03-12 RX ADMIN — Medication 100 MILLIGRAM(S): at 11:46

## 2021-03-12 RX ADMIN — Medication 1 PATCH: at 09:09

## 2021-03-12 RX ADMIN — LIDOCAINE 1 PATCH: 4 CREAM TOPICAL at 09:10

## 2021-03-12 RX ADMIN — INSULIN GLARGINE 8 UNIT(S): 100 INJECTION, SOLUTION SUBCUTANEOUS at 21:55

## 2021-03-12 RX ADMIN — LOSARTAN POTASSIUM 100 MILLIGRAM(S): 100 TABLET, FILM COATED ORAL at 05:41

## 2021-03-12 RX ADMIN — Medication 4: at 08:26

## 2021-03-12 RX ADMIN — SENNA PLUS 2 TABLET(S): 8.6 TABLET ORAL at 21:55

## 2021-03-12 RX ADMIN — TRAMADOL HYDROCHLORIDE 50 MILLIGRAM(S): 50 TABLET ORAL at 00:35

## 2021-03-12 RX ADMIN — Medication 25 MILLIGRAM(S): at 05:41

## 2021-03-12 RX ADMIN — Medication 25 MILLIGRAM(S): at 08:27

## 2021-03-12 RX ADMIN — Medication 5 UNIT(S): at 11:46

## 2021-03-12 RX ADMIN — Medication 5 UNIT(S): at 16:49

## 2021-03-12 RX ADMIN — LIDOCAINE 1 PATCH: 4 CREAM TOPICAL at 18:10

## 2021-03-12 RX ADMIN — JNJ-78436735 0.5 MILLILITER(S): 50000000000 SUSPENSION INTRAMUSCULAR at 14:27

## 2021-03-12 NOTE — PROGRESS NOTE ADULT - PROBLEM SELECTOR PLAN 1
SBP goal < 140  Off cardene gtt  add clonidine 0.1 bid  Cont with losartan, Nifedipine and Hydrochlorothiazide 25 daily
SBP goal < 140  Increase clonidine 0.3 bid  Cont with losartan, Nifedipine and Hydrochlorothiazide 25 daily
SBP goal < 140  Increase clonidine 0.2 bid  Cont with losartan, Nifedipine and Hydrochlorothiazide 25 daily

## 2021-03-12 NOTE — PROGRESS NOTE ADULT - SUBJECTIVE AND OBJECTIVE BOX
Subjective: Patient seen and examined. No new events except as noted.   remains hypertensive   REVIEW OF SYSTEMS:    CONSTITUTIONAL: + weakness, fevers or chills  EYES/ENT: No visual changes;  No vertigo or throat pain   NECK: No pain or stiffness  RESPIRATORY: No cough, wheezing, hemoptysis; No shortness of breath  CARDIOVASCULAR: No chest pain or palpitations  GASTROINTESTINAL: No abdominal or epigastric pain. No nausea, vomiting, or hematemesis; No diarrhea or constipation. No melena or hematochezia.  GENITOURINARY: No dysuria, frequency or hematuria  NEUROLOGICAL: No numbness or weakness  SKIN: No itching, burning, rashes, or lesions   All other review of systems is negative unless indicated above.    MEDICATIONS:  MEDICATIONS  (STANDING):  atorvastatin 80 milliGRAM(s) Oral at bedtime  cloNIDine 0.2 milliGRAM(s) Oral two times a day  dextrose 40% Gel 15 Gram(s) Oral once  dextrose 50% Injectable 25 Gram(s) IV Push once  dextrose 50% Injectable 12.5 Gram(s) IV Push once  dextrose 50% Injectable 25 Gram(s) IV Push once  enoxaparin Injectable 40 milliGRAM(s) SubCutaneous <User Schedule>  folic acid 1 milliGRAM(s) Oral daily  glucagon  Injectable 1 milliGRAM(s) IntraMuscular once  influenza   Vaccine 0.5 milliLiter(s) IntraMuscular once  insulin glargine Injectable (LANTUS) 8 Unit(s) SubCutaneous at bedtime  insulin lispro (ADMELOG) corrective regimen sliding scale   SubCutaneous Before meals and at bedtime  insulin lispro Injectable (ADMELOG) 5 Unit(s) SubCutaneous three times a day before meals  losartan 100 milliGRAM(s) Oral daily  multivitamin 1 Tablet(s) Oral daily  nicotine -  14 mG/24Hr(s) Patch 1 patch Transdermal daily  NIFEdipine XL 90 milliGRAM(s) Oral daily  polyethylene glycol 3350 17 Gram(s) Oral two times a day  senna 2 Tablet(s) Oral at bedtime  thiamine 100 milliGRAM(s) Oral daily      PHYSICAL EXAM:  T(C): 36.9 (03-12-21 @ 05:00), Max: 36.9 (03-12-21 @ 05:00)  HR: 91 (03-12-21 @ 05:00) (81 - 102)  BP: 171/124 (03-12-21 @ 05:00) (148/91 - 171/124)  RR: 18 (03-12-21 @ 05:00) (17 - 18)  SpO2: 95% (03-12-21 @ 05:00) (95% - 99%)  Wt(kg): --  I&O's Summary    11 Mar 2021 07:01  -  12 Mar 2021 07:00  --------------------------------------------------------  IN: 0 mL / OUT: 950 mL / NET: -950 mL            Appearance: NAD  HEENT:   Dry oral mucosa, PERRL, EOMI	  Lymphatic: No lymphadenopathy  Cardiovascular: Normal S1 S2, No JVD, No murmurs, No edema  Respiratory: Lungs clear to auscultation	  Psychiatry: A & O x 3, Mood & affect appropriate  Gastrointestinal:  Soft, Non-tender, + BS	  Skin: No rashes, No ecchymoses, No cyanosis	  NEURO EXAM:  Alert, oriented, speech fluent, no dysarthria, follows commands, EOMI, no facial asymmetry, mild LUE drift, otherwise 5/5 strength throughout, sensation symmetric to light touch, no dysmetria  Extremities: Normal range of motion, No clubbing, cyanosis or edema  Vascular: Peripheral pulses palpable 2+ bilaterally      LABS:    CARDIAC MARKERS:                                16.6   9.98  )-----------( 244      ( 11 Mar 2021 05:37 )             48.2     03-11    131<L>  |  90<L>  |  22  ----------------------------<  205<H>  3.9   |  23  |  0.54    Ca    10.7<H>      11 Mar 2021 05:37      proBNP:   Lipid Profile:   HgA1c:   TSH:             TELEMETRY: 	    ECG:  	  RADIOLOGY:   DIAGNOSTIC TESTING:  [ ] Echocardiogram:  [ ]  Catheterization:  [ ] Stress Test:    OTHER:

## 2021-03-12 NOTE — PROGRESS NOTE ADULT - PROBLEM SELECTOR PLAN 3
Stable and asymptomatic   Off ASA for now.

## 2021-03-12 NOTE — PROGRESS NOTE ADULT - ASSESSMENT
57 y/o M presented with headache and imbalance, CTH showed R BG/thalamic hemorrhage and IVH likely hypertensive in etiology.     Impression:  R BG/thalamic hemorrhage and IVH likely due to chronic hypertension    NEURO: Neurologically without change, Continue monitoring for neurologic deterioration, - started on high dose statin, patient had cerebral angiogram on 03/08 by Dr. Rainey which was negative, plan for MRI Brain w/o in 4-6 weeks ( 04/04-04/16) to rule out any underlying vascular malformation. Physical therapy/OT recommended AR.    ANTITHROMBOTIC THERAPY: none due to hemorrhage    PULMONARY: CXR clear, protecting airway, saturating well     CARDIOVASCULAR: TTE: EF 75-80%, normal left atrium, cardiac monitoring, cardiology consulted for BP control                             SBP goal: SBP < 160    GASTROINTESTINAL:  dysphagia screen- passed, tolerating diet       Diet: Regular    RENAL: BUN/Cr without acute change, good urine output      Na Goal: Greater than 135     [place]: N    HEMATOLOGY: H/H without acute change, Platelets 244     DVT ppx: Heparin s.c [] LMWH [x]     ID: afebrile, no leukocytosis, no si/sx of infection    OTHER: Plan of care discussed with patient and wife at bedside. Patient reported on going back pain- pain control PRN, CT Lumbar spine pending.    DISPOSITION: AR once bed available    CORE MEASURES:        Admission NIHSS: 0     TPA: [] YES [x] NO      LDL/HDL: 141/ 54     Depression Screen: 0     Statin Therapy: Y     Dysphagia Screen: [x] PASS [] FAIL     Smoking [x] YES [] NO      Afib [] YES [x] NO    Stroke Education [x] YES [] NO    Obtain screening lower extremity venous ultrasound in patients who meet 1 or more of the following criteria as patient is high risk for DVT/PE on admission:   [] History of DVT/PE  []Hypercoagulable states (Factor V Leiden, Cancer, OCP, etc. )  []Prolonged immobility (hemiplegia/hemiparesis/post operative or any other extended immobilization)  [] Transferred from outside facility (Rehab or Long term care)  [x] Age </= to 50. 55 y/o M presented with headache and imbalance, CTH showed R BG/thalamic hemorrhage and IVH likely hypertensive in etiology.     Impression:  R BG/thalamic hemorrhage and IVH likely due to chronic hypertension    NEURO: Neurologically without change, Continue monitoring for neurologic deterioration, - started on high dose statin, patient had cerebral angiogram on 03/08 by Dr. Rainey which was negative, plan for MRI Brain w/o in 4-6 weeks ( 04/04-04/16) to rule out any underlying vascular malformation. Physical therapy/OT recommended AR.    ANTITHROMBOTIC THERAPY: none due to hemorrhage    PULMONARY: CXR clear, protecting airway, saturating well     CARDIOVASCULAR: TTE: EF 75-80%, normal left atrium, cardiac monitoring, cardiology consulted for BP control                             SBP goal: SBP < 160    GASTROINTESTINAL:  dysphagia screen- passed, tolerating diet       Diet: Regular    RENAL: BUN/Cr without acute change, good urine output      Na Goal: Greater than 135     [place]: N    HEMATOLOGY: H/H without acute change, Platelets 244     DVT ppx: Heparin s.c [] LMWH [x]     ID: afebrile, no leukocytosis, no si/sx of infection. Agreeable to COVID vaccine will administer on 3/12.     OTHER: Plan of care discussed with patient and wife at bedside. Patient reported on going back pain- pain control PRN, CT Lumbar spine pending.    DISPOSITION: AR once bed available    CORE MEASURES:        Admission NIHSS: 0     TPA: [] YES [x] NO      LDL/HDL: 141/ 54     Depression Screen: 0     Statin Therapy: Y     Dysphagia Screen: [x] PASS [] FAIL     Smoking [x] YES [] NO      Afib [] YES [x] NO    Stroke Education [x] YES [] NO    Obtain screening lower extremity venous ultrasound in patients who meet 1 or more of the following criteria as patient is high risk for DVT/PE on admission:   [] History of DVT/PE  []Hypercoagulable states (Factor V Leiden, Cancer, OCP, etc. )  []Prolonged immobility (hemiplegia/hemiparesis/post operative or any other extended immobilization)  [] Transferred from outside facility (Rehab or Long term care)  [x] Age </= to 50. 55 y/o M presented with headache and imbalance, CTH showed R BG/thalamic hemorrhage and IVH likely hypertensive in etiology.     Impression:  R BG/thalamic hemorrhage and IVH likely due to chronic hypertension    NEURO: Neurologically without change, Continue monitoring for neurologic deterioration, - started on high dose statin, patient had cerebral angiogram on 03/08 by Dr. Rainey which was negative, plan for MRI Brain w/o in 4-6 weeks ( 04/04-04/16) to rule out any underlying vascular malformation. Physical therapy/OT recommended AR.    ANTITHROMBOTIC THERAPY: none due to hemorrhage    PULMONARY: CXR clear, protecting airway, saturating well     CARDIOVASCULAR: TTE: EF 75-80%, normal left atrium, cardiac monitoring, cardiology consulted for BP control                             SBP goal: SBP < 160    GASTROINTESTINAL:  dysphagia screen- passed, tolerating diet       Diet: Regular    RENAL: BUN/Cr without acute change, good urine output      Na Goal: Greater than 135     [place]: N    HEMATOLOGY: H/H without acute change, Platelets 244     DVT ppx: Heparin s.c [] LMWH [x]     ID: afebrile, no leukocytosis, no si/sx of infection.    OTHER: Plan of care discussed with patient and wife at bedside. Patient reported on going back pain- pain control PRN, CT Lumbar spine showed no acute fracture or dislocation or any evidence for spinal canal stenosis.    DISPOSITION: AR once bed available    CORE MEASURES:        Admission NIHSS: 0     TPA: [] YES [x] NO      LDL/HDL: 141/ 54     Depression Screen: 0     Statin Therapy: Y     Dysphagia Screen: [x] PASS [] FAIL     Smoking [x] YES [] NO      Afib [] YES [x] NO    Stroke Education [x] YES [] NO    Obtain screening lower extremity venous ultrasound in patients who meet 1 or more of the following criteria as patient is high risk for DVT/PE on admission:   [] History of DVT/PE  []Hypercoagulable states (Factor V Leiden, Cancer, OCP, etc. )  []Prolonged immobility (hemiplegia/hemiparesis/post operative or any other extended immobilization)  [] Transferred from outside facility (Rehab or Long term care)  [x] Age </= to 50.

## 2021-03-12 NOTE — PROGRESS NOTE ADULT - ASSESSMENT
55 y/o M presents with headache and imbalance, CTH showed R BG/thalamic hemorrhage and IVH likely hypertensive in etiology. Conventional angio ruled out vascular malformation. Exam remains stable, mild drift of LUE.

## 2021-03-12 NOTE — PROGRESS NOTE ADULT - SUBJECTIVE AND OBJECTIVE BOX
THE PATIENT WAS SEEN AND EXAMINED BY ME WITH THE HOUSESTAFF AND STROKE TEAM DURING MORNING ROUNDS.   HPI:  56M here w/ headache, imbalance, fall from 1400 3/6. As per patient, had posterior distribution headache yesterday, moderate intensity. Felt dizzy, imbalanced, fell. Went to urgent care, found to have high BP prior to admission. [facility] for further workup. NIHSS: 0, MRS: 0, ICH: 2.    ROS: Otherwise negative.    SUBJECTIVE: No events overnight.  No new neurologic complaints.  Reports back pain.      ID:     acetaminophen   Tablet .. 650 milliGRAM(s) Oral every 6 hours PRN  aluminum hydroxide/magnesium hydroxide/simethicone Suspension 30 milliLiter(s) Oral every 4 hours PRN  atorvastatin 80 milliGRAM(s) Oral at bedtime  cloNIDine 0.2 milliGRAM(s) Oral two times a day  coronavirus (EUA) Vaccine ([employer]) 0.5 milliLiter(s) IntraMuscular once  dextrose 40% Gel 15 Gram(s) Oral once  dextrose 50% Injectable 25 Gram(s) IV Push once  dextrose 50% Injectable 12.5 Gram(s) IV Push once  dextrose 50% Injectable 25 Gram(s) IV Push once  enoxaparin Injectable 40 milliGRAM(s) SubCutaneous <User Schedule>  folic acid 1 milliGRAM(s) Oral daily  glucagon  Injectable 1 milliGRAM(s) IntraMuscular once  hydrochlorothiazide 25 milliGRAM(s) Oral once  influenza   Vaccine 0.5 milliLiter(s) IntraMuscular once  insulin glargine Injectable (LANTUS) 8 Unit(s) SubCutaneous at bedtime  insulin lispro (ADMELOG) corrective regimen sliding scale   SubCutaneous Before meals and at bedtime  insulin lispro Injectable (ADMELOG) 5 Unit(s) SubCutaneous three times a day before meals  losartan 100 milliGRAM(s) Oral daily  multivitamin 1 Tablet(s) Oral daily  nicotine -  14 mG/24Hr(s) Patch 1 patch Transdermal daily  NIFEdipine XL 90 milliGRAM(s) Oral daily  polyethylene glycol 3350 17 Gram(s) Oral two times a day  senna 2 Tablet(s) Oral at bedtime  thiamine 100 milliGRAM(s) Oral daily  traMADol 25 milliGRAM(s) Oral every 6 hours PRN  traMADol 50 milliGRAM(s) Oral every 6 hours PRN      PHYSICAL EXAM:   Vital Signs Last 24 Hrs  T(C): 36.9 (12 Mar 2021 05:00), Max: 36.9 (12 Mar 2021 05:00)  T(F): 98.5 (12 Mar 2021 05:00), Max: 98.5 (12 Mar 2021 05:00)  HR: 91 (12 Mar 2021 05:00) (79 - 102)  BP: 171/124 (12 Mar 2021 05:00) (145/94 - 171/124)  RR: 18 (12 Mar 2021 05:00) (17 - 19)  SpO2: 95% (12 Mar 2021 05:00) (95% - 99%)    General: No acute distress  HEENT: EOM intact, visual fields full  Abdomen: Soft, nontender, nondistended   Extremities: No edema    NEUROLOGICAL EXAM:  Mental status: Awake, alert, oriented x3, speech fluent, follows commands, no neglect  Cranial Nerves: No facial asymmetry, no nystagmus, no dysarthria,  tongue midline  Motor exam: Normal tone, no drift, 5/5 RUE, 5/5 RLE, 4/5 LUE, 4/5 LLE, normal fine finger movements.  Sensation: Intact to light touch   Coordination/ Gait: ataxic gait with walker    LABS:                        16.6   9.98  )-----------( 244      ( 11 Mar 2021 05:37 )             48.2    03-11    131<L>  |  90<L>  |  22  ----------------------------<  205<H>  3.9   |  23  |  0.54    Ca    10.7<H>      11 Mar 2021 05:37    IMAGING: Reviewed by me.     CT Head (03/09/21): Slightly decreased size of right thalamic hemorrhage. Grossly stable intraventricular hemorrhage.    IR Neuro:  Diagnostic cerebral angiogram demonstrating diffuse mild intracranial atherosclerotic disease without significant stenosis, and no evidence of aneurysm, arteriovenous malformation or fistula.    CT Head/ CTA H/N  (03/07/21):   Acute right thalamic hemorrhage measuring up to 2.4 cm with intraventricular extension. There is no midline shift.  Mild ventriculomegaly is suggested. Cannot rule out developing hydrocephalus.  Age-indeterminate infarcts involving bilateral basal ganglia and thalami. THE PATIENT WAS SEEN AND EXAMINED BY ME WITH THE HOUSESTAFF AND STROKE TEAM DURING MORNING ROUNDS.   HPI:  56M here w/ headache, imbalance, fall from 1400 3/6. As per patient, had posterior distribution headache yesterday, moderate intensity. Felt dizzy, imbalanced, fell. Went to urgent care, found to have high BP prior to admission. [facility] for [facility] workup. NIHSS: 0, MRS: 0, ICH: 2.    ROS: Otherwise negative.    SUBJECTIVE: No events overnight.  No new neurologic complaints.  Reports back pain.      ID: [id number]    acetaminophen   Tablet .. 650 milliGRAM(s) Oral every 6 hours PRN  aluminum hydroxide/magnesium hydroxide/simethicone Suspension 30 milliLiter(s) Oral every 4 hours PRN  atorvastatin 80 milliGRAM(s) Oral at bedtime  cloNIDine 0.2 milliGRAM(s) Oral two times a day  coronavirus (EUA) Vaccine ([employer]) 0.5 milliLiter(s) IntraMuscular once  dextrose 40% Gel 15 Gram(s) Oral once  dextrose 50% Injectable 25 Gram(s) IV Push once  dextrose 50% Injectable 12.5 Gram(s) IV Push once  dextrose 50% Injectable 25 Gram(s) IV Push once  enoxaparin Injectable 40 milliGRAM(s) SubCutaneous <User Schedule>  folic acid 1 milliGRAM(s) Oral daily  glucagon  Injectable 1 milliGRAM(s) IntraMuscular once  hydrochlorothiazide 25 milliGRAM(s) Oral once  influenza   Vaccine 0.5 milliLiter(s) IntraMuscular once  insulin glargine Injectable (LANTUS) 8 Unit(s) SubCutaneous at bedtime  insulin lispro (ADMELOG) corrective regimen sliding scale   SubCutaneous Before meals and at bedtime  insulin lispro Injectable (ADMELOG) 5 Unit(s) SubCutaneous three times a day before meals  losartan 100 milliGRAM(s) Oral daily  multivitamin 1 Tablet(s) Oral daily  nicotine -  14 mG/24Hr(s) Patch 1 patch Transdermal daily  NIFEdipine XL 90 milliGRAM(s) Oral daily  polyethylene glycol 3350 17 Gram(s) Oral two times a day  senna 2 Tablet(s) Oral at bedtime  thiamine 100 milliGRAM(s) Oral daily  traMADol 25 milliGRAM(s) Oral every 6 hours PRN  traMADol 50 milliGRAM(s) Oral every 6 hours PRN      PHYSICAL EXAM:   Vital Signs Last 24 Hrs  T(C): 36.9 (12 Mar 2021 05:00), Max: 36.9 (12 Mar 2021 05:00)  T(F): 98.5 (12 Mar 2021 05:00), Max: 98.5 (12 Mar 2021 05:00)  HR: 91 (12 Mar 2021 05:00) (79 - 102)  BP: 171/124 (12 Mar 2021 05:00) (145/94 - 171/124)  RR: 18 (12 Mar 2021 05:00) (17 - 19)  SpO2: 95% (12 Mar 2021 05:00) (95% - 99%)    General: No acute distress  HEENT: EOM intact, visual fields full  Abdomen: Soft, nontender, nondistended   Extremities: No edema    NEUROLOGICAL EXAM:  Mental status: Awake, alert, oriented x3, speech fluent, follows commands, no neglect  Cranial Nerves: No facial asymmetry, no nystagmus, no dysarthria,  tongue midline  Motor exam: Normal tone, no drift, 5/5 RUE, 5/5 RLE, 5/5 LUE, 5/5 LLE  Sensation: Intact to light touch   Coordination/ Gait: ataxic gait with walker    LABS:                        16.6   9.98  )-----------( 244      ( 11 Mar 2021 05:37 )             48.2    03-11    131<L>  |  90<L>  |  22  ----------------------------<  205<H>  3.9   |  23  |  0.54    Ca    10.7<H>      11 Mar 2021 05:37    IMAGING: Reviewed by me.     CT Head (03/09/21): Slightly decreased size of right thalamic hemorrhage. Grossly stable intraventricular hemorrhage.    IR Neuro:  Diagnostic cerebral angiogram demonstrating diffuse mild intracranial atherosclerotic disease without significant stenosis, and no evidence of aneurysm, arteriovenous malformation or fistula.    CT Head/ CTA H/N  (03/07/21):   Acute right thalamic hemorrhage measuring up to 2.4 cm with intraventricular extension. There is no midline shift.  Mild ventriculomegaly is suggested. Cannot rule out developing hydrocephalus.  Age-indeterminate infarcts involving bilateral basal ganglia and thalami.

## 2021-03-13 ENCOUNTER — INPATIENT (INPATIENT)
Facility: HOSPITAL | Age: 56
LOS: 17 days | Discharge: HOME | DRG: 949 | End: 2021-03-31
Attending: SPECIALIST | Admitting: SPECIALIST
Payer: MEDICAID

## 2021-03-13 VITALS
SYSTOLIC BLOOD PRESSURE: 131 MMHG | TEMPERATURE: 98 F | OXYGEN SATURATION: 97 % | DIASTOLIC BLOOD PRESSURE: 91 MMHG | RESPIRATION RATE: 18 BRPM | HEART RATE: 86 BPM

## 2021-03-13 VITALS
HEIGHT: 74 IN | TEMPERATURE: 100 F | SYSTOLIC BLOOD PRESSURE: 129 MMHG | HEART RATE: 72 BPM | RESPIRATION RATE: 15 BRPM | DIASTOLIC BLOOD PRESSURE: 88 MMHG | WEIGHT: 215.17 LBS

## 2021-03-13 DIAGNOSIS — Z91.14 PATIENT'S OTHER NONCOMPLIANCE WITH MEDICATION REGIMEN: ICD-10-CM

## 2021-03-13 DIAGNOSIS — R20.0 ANESTHESIA OF SKIN: ICD-10-CM

## 2021-03-13 DIAGNOSIS — Z51.89 ENCOUNTER FOR OTHER SPECIFIED AFTERCARE: ICD-10-CM

## 2021-03-13 DIAGNOSIS — G47.00 INSOMNIA, UNSPECIFIED: ICD-10-CM

## 2021-03-13 DIAGNOSIS — I63.9 CEREBRAL INFARCTION, UNSPECIFIED: ICD-10-CM

## 2021-03-13 DIAGNOSIS — R41.4 NEUROLOGIC NEGLECT SYNDROME: ICD-10-CM

## 2021-03-13 DIAGNOSIS — I69.198 OTHER SEQUELAE OF NONTRAUMATIC INTRACEREBRAL HEMORRHAGE: ICD-10-CM

## 2021-03-13 DIAGNOSIS — I69.114 FRONTAL LOBE AND EXECUTIVE FUNCTION DEFICIT FOLLOWING NONTRAUMATIC INTRACEREBRAL HEMORRHAGE: ICD-10-CM

## 2021-03-13 DIAGNOSIS — E11.65 TYPE 2 DIABETES MELLITUS WITH HYPERGLYCEMIA: ICD-10-CM

## 2021-03-13 DIAGNOSIS — K59.00 CONSTIPATION, UNSPECIFIED: ICD-10-CM

## 2021-03-13 DIAGNOSIS — R50.9 FEVER, UNSPECIFIED: ICD-10-CM

## 2021-03-13 DIAGNOSIS — E87.6 HYPOKALEMIA: ICD-10-CM

## 2021-03-13 DIAGNOSIS — M54.5 LOW BACK PAIN: ICD-10-CM

## 2021-03-13 DIAGNOSIS — F17.210 NICOTINE DEPENDENCE, CIGARETTES, UNCOMPLICATED: ICD-10-CM

## 2021-03-13 DIAGNOSIS — E44.0 MODERATE PROTEIN-CALORIE MALNUTRITION: ICD-10-CM

## 2021-03-13 DIAGNOSIS — R26.9 UNSPECIFIED ABNORMALITIES OF GAIT AND MOBILITY: ICD-10-CM

## 2021-03-13 DIAGNOSIS — R27.8 OTHER LACK OF COORDINATION: ICD-10-CM

## 2021-03-13 DIAGNOSIS — I61.0 NONTRAUMATIC INTRACEREBRAL HEMORRHAGE IN HEMISPHERE, SUBCORTICAL: ICD-10-CM

## 2021-03-13 DIAGNOSIS — I10 ESSENTIAL (PRIMARY) HYPERTENSION: ICD-10-CM

## 2021-03-13 DIAGNOSIS — I69.154 HEMIPLEGIA AND HEMIPARESIS FOLLOWING NONTRAUMATIC INTRACEREBRAL HEMORRHAGE AFFECTING LEFT NON-DOMINANT SIDE: ICD-10-CM

## 2021-03-13 DIAGNOSIS — E78.5 HYPERLIPIDEMIA, UNSPECIFIED: ICD-10-CM

## 2021-03-13 DIAGNOSIS — E87.1 HYPO-OSMOLALITY AND HYPONATREMIA: ICD-10-CM

## 2021-03-13 LAB
ANION GAP SERPL CALC-SCNC: 11 MMOL/L — SIGNIFICANT CHANGE UP (ref 5–17)
APPEARANCE UR: CLEAR — SIGNIFICANT CHANGE UP
BASOPHILS # BLD AUTO: 0.04 K/UL — SIGNIFICANT CHANGE UP (ref 0–0.2)
BASOPHILS NFR BLD AUTO: 0.3 % — SIGNIFICANT CHANGE UP (ref 0–2)
BILIRUB UR-MCNC: NEGATIVE — SIGNIFICANT CHANGE UP
BUN SERPL-MCNC: 30 MG/DL — HIGH (ref 7–23)
CALCIUM SERPL-MCNC: 9.8 MG/DL — SIGNIFICANT CHANGE UP (ref 8.4–10.5)
CHLORIDE SERPL-SCNC: 91 MMOL/L — LOW (ref 96–108)
CO2 SERPL-SCNC: 28 MMOL/L — SIGNIFICANT CHANGE UP (ref 22–31)
COLOR SPEC: YELLOW — SIGNIFICANT CHANGE UP
CREAT SERPL-MCNC: 0.92 MG/DL — SIGNIFICANT CHANGE UP (ref 0.5–1.3)
DIFF PNL FLD: NEGATIVE — SIGNIFICANT CHANGE UP
EOSINOPHIL # BLD AUTO: 0.03 K/UL — SIGNIFICANT CHANGE UP (ref 0–0.5)
EOSINOPHIL NFR BLD AUTO: 0.3 % — SIGNIFICANT CHANGE UP (ref 0–6)
GLUCOSE BLDC GLUCOMTR-MCNC: 193 MG/DL — HIGH (ref 70–99)
GLUCOSE BLDC GLUCOMTR-MCNC: 198 MG/DL — HIGH (ref 70–99)
GLUCOSE BLDC GLUCOMTR-MCNC: 256 MG/DL — HIGH (ref 70–99)
GLUCOSE BLDC GLUCOMTR-MCNC: 294 MG/DL — HIGH (ref 70–99)
GLUCOSE SERPL-MCNC: 208 MG/DL — HIGH (ref 70–99)
GLUCOSE UR QL: 250
HCT VFR BLD CALC: 47.2 % — SIGNIFICANT CHANGE UP (ref 39–50)
HGB BLD-MCNC: 16.3 G/DL — SIGNIFICANT CHANGE UP (ref 13–17)
IMM GRANULOCYTES NFR BLD AUTO: 0.5 % — SIGNIFICANT CHANGE UP (ref 0–1.5)
KETONES UR-MCNC: NEGATIVE — SIGNIFICANT CHANGE UP
LACTATE SERPL-SCNC: 1.5 MMOL/L — SIGNIFICANT CHANGE UP (ref 0.7–2)
LEUKOCYTE ESTERASE UR-ACNC: NEGATIVE — SIGNIFICANT CHANGE UP
LYMPHOCYTES # BLD AUTO: 1.87 K/UL — SIGNIFICANT CHANGE UP (ref 1–3.3)
LYMPHOCYTES # BLD AUTO: 15.6 % — SIGNIFICANT CHANGE UP (ref 13–44)
MCHC RBC-ENTMCNC: 30.4 PG — SIGNIFICANT CHANGE UP (ref 27–34)
MCHC RBC-ENTMCNC: 34.5 GM/DL — SIGNIFICANT CHANGE UP (ref 32–36)
MCV RBC AUTO: 88.1 FL — SIGNIFICANT CHANGE UP (ref 80–100)
MONOCYTES # BLD AUTO: 1.09 K/UL — HIGH (ref 0–0.9)
MONOCYTES NFR BLD AUTO: 9.1 % — SIGNIFICANT CHANGE UP (ref 2–14)
NEUTROPHILS # BLD AUTO: 8.91 K/UL — HIGH (ref 1.8–7.4)
NEUTROPHILS NFR BLD AUTO: 74.2 % — SIGNIFICANT CHANGE UP (ref 43–77)
NITRITE UR-MCNC: NEGATIVE — SIGNIFICANT CHANGE UP
NRBC # BLD: 0 /100 WBCS — SIGNIFICANT CHANGE UP (ref 0–0)
PH UR: 5 — SIGNIFICANT CHANGE UP (ref 5–8)
PLATELET # BLD AUTO: 246 K/UL — SIGNIFICANT CHANGE UP (ref 150–400)
POTASSIUM SERPL-MCNC: 3.3 MMOL/L — LOW (ref 3.5–5.3)
POTASSIUM SERPL-SCNC: 3.3 MMOL/L — LOW (ref 3.5–5.3)
PROT UR-MCNC: 15
RBC # BLD: 5.36 M/UL — SIGNIFICANT CHANGE UP (ref 4.2–5.8)
RBC # FLD: 12.5 % — SIGNIFICANT CHANGE UP (ref 10.3–14.5)
SARS-COV-2 RNA SPEC QL NAA+PROBE: SIGNIFICANT CHANGE UP
SODIUM SERPL-SCNC: 130 MMOL/L — LOW (ref 135–145)
SP GR SPEC: 1.02 — SIGNIFICANT CHANGE UP (ref 1.01–1.02)
UROBILINOGEN FLD QL: NEGATIVE — SIGNIFICANT CHANGE UP
WBC # BLD: 12 K/UL — HIGH (ref 3.8–10.5)
WBC # FLD AUTO: 12 K/UL — HIGH (ref 3.8–10.5)

## 2021-03-13 PROCEDURE — 97129 THER IVNTJ 1ST 15 MIN: CPT

## 2021-03-13 PROCEDURE — 85027 COMPLETE CBC AUTOMATED: CPT

## 2021-03-13 PROCEDURE — C1894: CPT

## 2021-03-13 PROCEDURE — 71045 X-RAY EXAM CHEST 1 VIEW: CPT

## 2021-03-13 PROCEDURE — 70450 CT HEAD/BRAIN W/O DYE: CPT

## 2021-03-13 PROCEDURE — 82553 CREATINE MB FRACTION: CPT

## 2021-03-13 PROCEDURE — 97530 THERAPEUTIC ACTIVITIES: CPT

## 2021-03-13 PROCEDURE — 99291 CRITICAL CARE FIRST HOUR: CPT | Mod: 25

## 2021-03-13 PROCEDURE — C1769: CPT

## 2021-03-13 PROCEDURE — 84436 ASSAY OF TOTAL THYROXINE: CPT

## 2021-03-13 PROCEDURE — 93970 EXTREMITY STUDY: CPT

## 2021-03-13 PROCEDURE — 85025 COMPLETE CBC W/AUTO DIFF WBC: CPT

## 2021-03-13 PROCEDURE — 80053 COMPREHEN METABOLIC PANEL: CPT

## 2021-03-13 PROCEDURE — 86850 RBC ANTIBODY SCREEN: CPT

## 2021-03-13 PROCEDURE — 85610 PROTHROMBIN TIME: CPT

## 2021-03-13 PROCEDURE — 84443 ASSAY THYROID STIM HORMONE: CPT

## 2021-03-13 PROCEDURE — 97161 PT EVAL LOW COMPLEX 20 MIN: CPT

## 2021-03-13 PROCEDURE — 80061 LIPID PANEL: CPT

## 2021-03-13 PROCEDURE — 71045 X-RAY EXAM CHEST 1 VIEW: CPT | Mod: 26

## 2021-03-13 PROCEDURE — 36224 PLACE CATH CAROTD ART: CPT

## 2021-03-13 PROCEDURE — 93306 TTE W/DOPPLER COMPLETE: CPT

## 2021-03-13 PROCEDURE — 86900 BLOOD TYPING SEROLOGIC ABO: CPT

## 2021-03-13 PROCEDURE — 92507 TX SP LANG VOICE COMM INDIV: CPT

## 2021-03-13 PROCEDURE — 82962 GLUCOSE BLOOD TEST: CPT

## 2021-03-13 PROCEDURE — 70496 CT ANGIOGRAPHY HEAD: CPT

## 2021-03-13 PROCEDURE — 82803 BLOOD GASES ANY COMBINATION: CPT

## 2021-03-13 PROCEDURE — 70498 CT ANGIOGRAPHY NECK: CPT

## 2021-03-13 PROCEDURE — 72131 CT LUMBAR SPINE W/O DYE: CPT

## 2021-03-13 PROCEDURE — U0003: CPT

## 2021-03-13 PROCEDURE — 97165 OT EVAL LOW COMPLEX 30 MIN: CPT

## 2021-03-13 PROCEDURE — 97116 GAIT TRAINING THERAPY: CPT

## 2021-03-13 PROCEDURE — 83036 HEMOGLOBIN GLYCOSYLATED A1C: CPT

## 2021-03-13 PROCEDURE — 84484 ASSAY OF TROPONIN QUANT: CPT

## 2021-03-13 PROCEDURE — 83735 ASSAY OF MAGNESIUM: CPT

## 2021-03-13 PROCEDURE — 97110 THERAPEUTIC EXERCISES: CPT

## 2021-03-13 PROCEDURE — 92523 SPEECH SOUND LANG COMPREHEN: CPT

## 2021-03-13 PROCEDURE — 36227 PLACE CATH XTRNL CAROTID: CPT

## 2021-03-13 PROCEDURE — 87641 MR-STAPH DNA AMP PROBE: CPT

## 2021-03-13 PROCEDURE — 86901 BLOOD TYPING SEROLOGIC RH(D): CPT

## 2021-03-13 PROCEDURE — 84100 ASSAY OF PHOSPHORUS: CPT

## 2021-03-13 PROCEDURE — 84480 ASSAY TRIIODOTHYRONINE (T3): CPT

## 2021-03-13 PROCEDURE — 97535 SELF CARE MNGMENT TRAINING: CPT

## 2021-03-13 PROCEDURE — U0005: CPT

## 2021-03-13 PROCEDURE — 87640 STAPH A DNA AMP PROBE: CPT

## 2021-03-13 PROCEDURE — 36226 PLACE CATH VERTEBRAL ART: CPT

## 2021-03-13 PROCEDURE — C1887: CPT

## 2021-03-13 PROCEDURE — 0031A: CPT

## 2021-03-13 PROCEDURE — C1760: CPT

## 2021-03-13 PROCEDURE — 80048 BASIC METABOLIC PNL TOTAL CA: CPT

## 2021-03-13 PROCEDURE — 82550 ASSAY OF CK (CPK): CPT

## 2021-03-13 PROCEDURE — 85730 THROMBOPLASTIN TIME PARTIAL: CPT

## 2021-03-13 RX ORDER — POTASSIUM CHLORIDE 20 MEQ
40 PACKET (EA) ORAL ONCE
Refills: 0 | Status: COMPLETED | OUTPATIENT
Start: 2021-03-13 | End: 2021-03-13

## 2021-03-13 RX ORDER — INSULIN LISPRO 100/ML
5 VIAL (ML) SUBCUTANEOUS
Refills: 0 | Status: DISCONTINUED | OUTPATIENT
Start: 2021-03-13 | End: 2021-03-14

## 2021-03-13 RX ORDER — NIFEDIPINE 30 MG
90 TABLET, EXTENDED RELEASE 24 HR ORAL DAILY
Refills: 0 | Status: DISCONTINUED | OUTPATIENT
Start: 2021-03-13 | End: 2021-03-14

## 2021-03-13 RX ORDER — INFLUENZA VIRUS VACCINE 15; 15; 15; 15 UG/.5ML; UG/.5ML; UG/.5ML; UG/.5ML
0.5 SUSPENSION INTRAMUSCULAR ONCE
Refills: 0 | Status: COMPLETED | OUTPATIENT
Start: 2021-03-13 | End: 2021-03-13

## 2021-03-13 RX ORDER — LOSARTAN POTASSIUM 100 MG/1
100 TABLET, FILM COATED ORAL DAILY
Refills: 0 | Status: DISCONTINUED | OUTPATIENT
Start: 2021-03-13 | End: 2021-03-14

## 2021-03-13 RX ORDER — INSULIN LISPRO 100/ML
VIAL (ML) SUBCUTANEOUS
Refills: 0 | Status: DISCONTINUED | OUTPATIENT
Start: 2021-03-13 | End: 2021-03-24

## 2021-03-13 RX ORDER — INSULIN GLARGINE 100 [IU]/ML
8 INJECTION, SOLUTION SUBCUTANEOUS AT BEDTIME
Refills: 0 | Status: DISCONTINUED | OUTPATIENT
Start: 2021-03-13 | End: 2021-03-14

## 2021-03-13 RX ORDER — HYDROCHLOROTHIAZIDE 25 MG
25 TABLET ORAL DAILY
Refills: 0 | Status: DISCONTINUED | OUTPATIENT
Start: 2021-03-13 | End: 2021-03-14

## 2021-03-13 RX ADMIN — Medication 1 PATCH: at 11:58

## 2021-03-13 RX ADMIN — Medication 3: at 07:43

## 2021-03-13 RX ADMIN — INSULIN GLARGINE 8 UNIT(S): 100 INJECTION, SOLUTION SUBCUTANEOUS at 22:39

## 2021-03-13 RX ADMIN — Medication 0.3 MILLIGRAM(S): at 05:36

## 2021-03-13 RX ADMIN — Medication 1: at 22:25

## 2021-03-13 RX ADMIN — Medication 0.3 MILLIGRAM(S): at 11:57

## 2021-03-13 RX ADMIN — SENNA PLUS 2 TABLET(S): 8.6 TABLET ORAL at 22:22

## 2021-03-13 RX ADMIN — Medication 1 MILLIGRAM(S): at 11:57

## 2021-03-13 RX ADMIN — Medication 100 MILLIGRAM(S): at 11:58

## 2021-03-13 RX ADMIN — Medication 5 UNIT(S): at 11:58

## 2021-03-13 RX ADMIN — Medication 1 PATCH: at 07:15

## 2021-03-13 RX ADMIN — LOSARTAN POTASSIUM 100 MILLIGRAM(S): 100 TABLET, FILM COATED ORAL at 05:37

## 2021-03-13 RX ADMIN — ATORVASTATIN CALCIUM 80 MILLIGRAM(S): 80 TABLET, FILM COATED ORAL at 22:22

## 2021-03-13 RX ADMIN — Medication 650 MILLIGRAM(S): at 16:26

## 2021-03-13 RX ADMIN — Medication 90 MILLIGRAM(S): at 05:37

## 2021-03-13 RX ADMIN — Medication 2: at 17:30

## 2021-03-13 RX ADMIN — Medication 650 MILLIGRAM(S): at 22:42

## 2021-03-13 RX ADMIN — Medication 40 MILLIEQUIVALENT(S): at 22:23

## 2021-03-13 RX ADMIN — Medication 650 MILLIGRAM(S): at 16:56

## 2021-03-13 RX ADMIN — Medication 5 UNIT(S): at 17:34

## 2021-03-13 RX ADMIN — Medication 1 ENEMA: at 11:58

## 2021-03-13 RX ADMIN — Medication 5 UNIT(S): at 07:43

## 2021-03-13 RX ADMIN — Medication 1 PATCH: at 11:50

## 2021-03-13 RX ADMIN — Medication 1 TABLET(S): at 11:57

## 2021-03-13 RX ADMIN — ENOXAPARIN SODIUM 40 MILLIGRAM(S): 100 INJECTION SUBCUTANEOUS at 18:56

## 2021-03-13 RX ADMIN — Medication 0.3 MILLIGRAM(S): at 22:22

## 2021-03-13 RX ADMIN — Medication 25 MILLIGRAM(S): at 05:36

## 2021-03-13 RX ADMIN — Medication 650 MILLIGRAM(S): at 23:00

## 2021-03-13 RX ADMIN — Medication 1: at 11:58

## 2021-03-13 NOTE — PROGRESS NOTE ADULT - ASSESSMENT
55 y/o M presented with headache and imbalance, CTH showed R BG/thalamic hemorrhage and IVH likely hypertensive in etiology.     Impression:  R BG/thalamic hemorrhage and IVH likely due to chronic hypertension    NEURO: Neurologically without change, - started on high dose statin, patient had cerebral angiogram on 03/08 by Dr. Rainey which was negative, plan for MRI Brain w/o in 4-6 weeks ( 04/04-04/16) to rule out any underlying vascular malformation. Physical therapy/OT recommended AR.    ANTITHROMBOTIC THERAPY: none due to hemorrhage    PULMONARY: CXR clear, protecting airway, saturating well     CARDIOVASCULAR: TTE: EF 75-80%, normal left atrium, cardiac monitoring, cardiology consulted for BP control                             SBP goal: SBP < 160    GASTROINTESTINAL:  dysphagia screen- passed, tolerating diet       Diet: Regular    RENAL: BUN/Cr within normal limits on 3/11, good urine output      Na Goal: Greater than 135     [place]: N    HEMATOLOGY: No signs of bleeding     DVT ppx: Heparin s.c [] LMWH [x]     ID: afebrile, no si/sx of infection. Received COVID vaccine after risks vs benefits and side effects discussed with patient and his wife.     OTHER: Plan of care discussed with patient and wife at bedside. Patient reported on going back pain- pain control PRN, CT Lumbar spine showed no acute fracture or dislocation or any evidence for spinal canal stenosis.    DISPOSITION: AR once bed available    CORE MEASURES:        Admission NIHSS: 0     TPA: [] YES [x] NO      LDL/HDL: 141/ 54     Depression Screen: 0     Statin Therapy: Y     Dysphagia Screen: [x] PASS [] FAIL     Smoking [x] YES [] NO      Afib [] YES [x] NO    Stroke Education [x] YES [] NO    Obtain screening lower extremity venous ultrasound in patients who meet 1 or more of the following criteria as patient is high risk for DVT/PE on admission:   [] History of DVT/PE  []Hypercoagulable states (Factor V Leiden, Cancer, OCP, etc. )  []Prolonged immobility (hemiplegia/hemiparesis/post operative or any other extended immobilization)  [] Transferred from outside facility (Rehab or Long term care)  [x] Age </= to 50. 55 y/o M presented with headache and imbalance, CTH showed R BG/thalamic hemorrhage and IVH likely hypertensive in etiology.     Impression:  R BG/thalamic hemorrhage and IVH likely due to chronic hypertension    NEURO: Neurologically without change, - started on high dose statin, patient had cerebral angiogram on 03/08 by Dr. Rainey which was negative, plan for MRI Brain w/o in 4-6 weeks ( 04/04-04/16) to rule out any underlying vascular malformation. Physical therapy/OT recommended AR.    ANTITHROMBOTIC THERAPY: none due to hemorrhage    PULMONARY: CXR clear, protecting airway, saturating well     CARDIOVASCULAR: TTE: EF 75-80%, normal left atrium, cardiac monitoring, cardiology consulted for BP control                             SBP goal: SBP < 160    GASTROINTESTINAL:  dysphagia screen- passed, tolerating diet       Diet: Regular    RENAL: BUN/Cr within normal limits on 3/11, good urine output      Na Goal: Greater than 135     [place]: N    HEMATOLOGY: No signs of bleeding     DVT ppx: Heparin s.c [] LMWH [x]     ID: afebrile, no si/sx of infection. Received COVID vaccine on 3/12 after risks vs benefits and side effects discussed with patient and his wife.     OTHER: Plan of care discussed with patient and wife at bedside. Patient reported back pain that has now improved- pain control PRN, CT Lumbar spine showed no acute fracture or dislocation or any evidence for spinal canal stenosis.    DISPOSITION: AR once bed available    CORE MEASURES:        Admission NIHSS: 0     TPA: [] YES [x] NO      LDL/HDL: 141/ 54     Depression Screen: 0     Statin Therapy: Y     Dysphagia Screen: [x] PASS [] FAIL     Smoking [x] YES [] NO      Afib [] YES [x] NO    Stroke Education [x] YES [] NO    Obtain screening lower extremity venous ultrasound in patients who meet 1 or more of the following criteria as patient is high risk for DVT/PE on admission:   [] History of DVT/PE  []Hypercoagulable states (Factor V Leiden, Cancer, OCP, etc. )  []Prolonged immobility (hemiplegia/hemiparesis/post operative or any other extended immobilization)  [] Transferred from outside facility (Rehab or Long term care)  [x] Age </= to 50.

## 2021-03-13 NOTE — H&P ADULT - NSHPPHYSICALEXAM_GEN_ALL_CORE
Vital Signs  T(C): 36.8 (03-13-21 @ 08:42), Max: 37.5 (03-13-21 @ 00:00)  HR: 80 (03-13-21 @ 08:42) (72 - 91)  BP: 124/91 (03-13-21 @ 08:42) (124/91 - 147/101)  RR: 18 (03-13-21 @ 08:42) (18 - 18)  SpO2: 97% (03-13-21 @ 08:42) (96% - 99%)    Gen - NAD, Comfortable  HEENT - NCAT, EOMI, MMM  Neck - Supple, No limited ROM  Pulm - CTAB, No wheeze, No rhonchi, No crackles  Cardiovascular - S1S2  Abdomen - Soft, NT/ND, +BS  Extremities - No C/C/E, No calf tenderness  Neuro-     Cognitive - Awake, Alert, AAO to self, place, date, year, situation     Communication - Fluent, No dysarthria     Cranial Nerves - no facial assymetry     Motor - mild left sided weakness, +pronator drift                     LEFT    UE - ShAB 5/5, EF 5/5, EE 5/5, WE 5/5,  5/5                    RIGHT UE - ShAB 5/5, EF 5/5, EE 5/5, WE 5/5,  5/5                    LEFT    LE - HF 5/5, KE 5/5, DF 5/5, PF 5/5                    RIGHT LE - HF 5/5, KE 5/5, DF 5/5, PF 5/5        Sensory - Intact to LT     Reflexes - DTR Intact, No primitive reflex     Coordination - FTN intact  Psychiatric - Mood stable, Affect WNL  Skin: Vital Signs  T(C): 36.8 (03-13-21 @ 08:42), Max: 37.5 (03-13-21 @ 00:00)  HR: 80 (03-13-21 @ 08:42) (72 - 91)  BP: 124/91 (03-13-21 @ 08:42) (124/91 - 147/101)  RR: 18 (03-13-21 @ 08:42) (18 - 18)  SpO2: 97% (03-13-21 @ 08:42) (96% - 99%)    Gen - NAD, Comfortable  HEENT - NCAT, EOMI, MMM  Neck - Supple, No limited ROM  Pulm - CTAB, No wheeze, No rhonchi  Cardiovascular - S1S2  Abdomen - Soft, NT/ND, +BS  Extremities - No C/C/E, No calf tenderness  Neuro-     Cognitive - Awake, Alert, AAO to self, place, date, year, situation. Able to name days of week, perform serial 7's     Communication - Fluent, No dysarthria     Memory - 3/3 recall immediate, 2/3 after 5 min     Cranial Nerves - no facial asymmetry     Motor - mild left sided weakness, +pronator drift                     LEFT    UE - ShAB 5/5, EF 5/5, EE 4/5, WE 4/5,  5/5                    RIGHT UE - ShAB 5/5, EF 5/5, EE 5/5, WE 5/5,  5/5                    LEFT    LE - HF 4/5, KE 5/5, DF 5/5, PF 5/5                    RIGHT LE - HF 5/5, KE 5/5, DF 5/5, PF 5/5        Sensory - Intact to LT     Reflexes - DTR Intact, No primitive reflex     Coordination - FTN and HTS dysmetria  Psychiatric - Mood stable, Affect WNL  Skin: intact

## 2021-03-13 NOTE — H&P ADULT - ASSESSMENT
PENNIE GIANG is a 56M with PMHx of HTN (non-compliant with medications) who presented to [facility] on 03/07/2021 with HA and imbalance s/p fall on 3/6, admission SBP >220, found to have right thalamic hemorrhage with intraventricular extension.Admitted for multidisciplinary rehab program.      #Comprehensive Multidisciplinary Rehab Program:  - Gait, ADL, Functional impairments  - PT/OT/ SLP 3 hours a day 5 days a week    #Right thalamic hemorrhage with intraventricular extension  - likely 2/2 uncontrolled HTN  - s/p cerebral angiography-->no evidence of AVM; last CT on 3/9 was stable  - per neuro, MRI in 4-6 weeks to r/o vascular malformations  - BP control, as below    #HTN  - c/w Clonidine 0.3mg TID, HCTZ 25mg daily, Nifedipine 90mg daily, and Losartan 100mg daily    #HLD  - c/w Atorvastatin 80mg qhs    #LBP  - CT lumbar spine negative for acute pathology  - pain, as below    #Smoking history  - c/w Nicotine patch daily    #Sleep:  - Maintain quiet hours and low stim environment  - Melatonin PRN    #Pain:  - Tylenol 650mg q6h PRN mild pain, Tramadol 25mg q6h PRN moderate pain, and Tramadol 50mg q6h PRN severe pain  - avoid sedating meds that may affect cognitive recovery    #GI/Bowel:  - Senna 2 tabs qhs and Miralax daily    #/Bladder:  - Monitor PVR if no void in 8h; SC for >400 cc  - Toileting schedule q4h    #Diet / Dysphagia:    - Diet: DASH/TLC and carb consistent  - ongoing SLP assessment  - Nutrition to follow    #Skin/ Pressure Injury Prevention:  - assessment on admission   - Incisions:  - Turn Q2hrs in bed while awake, OOB to Chair, PT/OT/SLP     #DVT prophylaxis:  - Lovenox 40mg daily  - SCDs    #Precautions/ Restrictions  - Falls  - COVID PCR:     --------------------------------------------  Outpatient Follow up:  Sandro Moura)  Neurology; Vascular Neurology  [address], [address]  [place] [address]  Phone: [phone]  Fax: [phone]  Follow Up Time: 1 month    Joni Yeung)  Cardiology; Internal Medicine  [address], [address]  [place] [address]  Phone: [phone]  Fax: [phone]  Follow Up Time: 1 month  --------------------------------------------      MEDICAL PROGNOSIS: GOOD            REHAB POTENTIAL: GOOD             ESTIMATED DISPOSITION: HOME WITH HOME CARE            ELOS: 10-14 Days   EXPECTED THERAPY:     P.T. 1hr/day       O.T. 1hr/day      S.L.P. 1hr/day     P&O Unnecessary     EXP FREQUENCY: 5 days per 7 day period     PRESCREEN COMPARISON:   I have reviewed the prescreen information and I have found no relevant changes between the preadmission screening and my post admission evaluation     RATIONALE FOR INPATIENT ADMISSION - Patient demonstrates the following: (check all that apply)  [X] Medically appropriate for rehabilitation admission  [X] Has attainable rehab goals with an appropriate initial discharge plan  [X] Has rehabilitation potential (expected to make a significant improvement within a reasonable period of time)   [X] Requires close medical management by a rehab physician, rehab nursing care, Hospitalist and comprehensive interdisciplinary team (including PT, OT, & or SLP, Prosthetics and Orthotics)    PENNIE GIANG is a 56M with PMHx of HTN (non-compliant with medications) who presented to [facility] on 03/07/2021 with HA and imbalance s/p fall on 3/6, admission SBP >220, found to have right thalamic hemorrhage with intraventricular extension.Admitted for multidisciplinary rehab program.      #Comprehensive Multidisciplinary Rehab Program:  - Gait, ADL, Functional impairments  - PT/OT/ SLP 3 hours a day 5 days a week    #Right thalamic hemorrhage with intraventricular extension  - likely 2/2 uncontrolled HTN  - s/p cerebral angiography-->no evidence of AVM; last CT on 3/9 was stable  - per neuro, MRI in 4-6 weeks to r/o vascular malformations  - BP control, as below    #HTN  - c/w Clonidine 0.3mg TID, HCTZ 25mg daily, Nifedipine 90mg daily, and Losartan 100mg daily    #fever  - 3/13 oral temp 99.6F, rectal 101F on admission, other VSS, patient appears nontoxic.   - COVID-19 PCR, UA and CXR ordered.   - Hospitalist consult.     #HLD  - c/w Atorvastatin 80mg qhs    #LBP  - CT lumbar spine negative for acute pathology  - pain, as below    #Smoking history  - c/w Nicotine patch daily    #Sleep:  - Maintain quiet hours and low stim environment  - Melatonin PRN    #Pain:  - Tylenol 650mg q6h PRN mild pain, Tramadol 25mg q6h PRN moderate pain, and Tramadol 50mg q6h PRN severe pain  - avoid sedating meds that may affect cognitive recovery    #GI/Bowel:  - Senna 2 tabs qhs and Miralax daily    #/Bladder:  - Monitor PVR if no void in 8h; SC for >400 cc  - Toileting schedule q4h    #Diet / Dysphagia:    - Diet: DASH/TLC and carb consistent  - ongoing SLP assessment  - Nutrition to follow    #Skin/ Pressure Injury Prevention:  - assessment on admission   - Incisions:  - Turn Q2hrs in bed while awake, OOB to Chair, PT/OT/SLP     #DVT prophylaxis:  - Lovenox 40mg daily  - SCDs    #Precautions/ Restrictions  - Falls  - COVID PCR:     --------------------------------------------  Outpatient Follow up:  Sandro Moura)  Neurology; Vascular Neurology  [address]  [place] [address]  Phone: [phone]  Fax: [phone]  Follow Up Time: 1 month    Joni Yeung)  Cardiology; Internal Medicine  [address], [address]  [place] [address]  Phone: [phone]  Fax: [phone]  Follow Up Time: 1 month  --------------------------------------------      MEDICAL PROGNOSIS: GOOD            REHAB POTENTIAL: GOOD             ESTIMATED DISPOSITION: HOME WITH HOME CARE            ELOS: 10-14 Days   EXPECTED THERAPY:     P.T. 1hr/day       O.T. 1hr/day      S.L.P. 1hr/day     P&O Unnecessary     EXP FREQUENCY: 5 days per 7 day period     PRESCREEN COMPARISON:   I have reviewed the prescreen information and I have found no relevant changes between the preadmission screening and my post admission evaluation     RATIONALE FOR INPATIENT ADMISSION - Patient demonstrates the following: (check all that apply)  [X] Medically appropriate for rehabilitation admission  [X] Has attainable rehab goals with an appropriate initial discharge plan  [X] Has rehabilitation potential (expected to make a significant improvement within a reasonable period of time)   [X] Requires close medical management by a rehab physician, rehab nursing care, Hospitalist and comprehensive interdisciplinary team (including PT, OT, & or SLP, Prosthetics and Orthotics)    PENNIE GIANG is a 56M with PMHx of HTN (non-compliant with medications) who presented to [facility] on 03/07/2021 with HA and imbalance s/p fall on 3/6, admission SBP >220, found to have right thalamic hemorrhage with intraventricular extension.Admitted for multidisciplinary rehab program.      #Comprehensive Multidisciplinary Rehab Program:  - Gait, ADL, Functional impairments  - PT/OT/ SLP 3 hours a day 5 days a week    #Right thalamic hemorrhage with intraventricular extension  - likely 2/2 uncontrolled HTN  - s/p cerebral angiography-->no evidence of AVM; last CT on 3/9 was stable  - per neuro, MRI in 4-6 weeks to r/o vascular malformations  - BP control, as below    #HTN  - c/w Clonidine 0.3mg TID, HCTZ 25mg daily, Nifedipine 90mg daily, and Losartan 100mg daily    #fever  - 3/13 oral temp 99.6F, rectal 101F on admission, other VSS, patient appears nontoxic.   - CBC, BMP, COVID-19 PCR, UA and CXR ordered.   - Hospitalist consult.     #HLD  - c/w Atorvastatin 80mg qhs    #LBP  - CT lumbar spine negative for acute pathology  - pain, as below    #Smoking history  - c/w Nicotine patch daily    #Sleep:  - Maintain quiet hours and low stim environment  - Melatonin PRN    #Pain:  - Tylenol 650mg q6h PRN mild pain, Tramadol 25mg q6h PRN moderate pain, and Tramadol 50mg q6h PRN severe pain  - avoid sedating meds that may affect cognitive recovery    #GI/Bowel:  - Senna 2 tabs qhs and Miralax daily    #/Bladder:  - Monitor PVR if no void in 8h; SC for >400 cc  - Toileting schedule q4h    #Diet / Dysphagia:    - Diet: DASH/TLC and carb consistent  - ongoing SLP assessment  - Nutrition to follow    #Skin/ Pressure Injury Prevention:  - assessment on admission   - Incisions:  - Turn Q2hrs in bed while awake, OOB to Chair, PT/OT/SLP     #DVT prophylaxis:  - Lovenox 40mg daily  - SCDs    #Precautions/ Restrictions  - Falls  - COVID PCR:     --------------------------------------------  Outpatient Follow up:  Sandro Moura)  Neurology; Vascular Neurology  [address], [address]  [place] [address]  Phone: [phone]  Fax: [phone]  Follow Up Time: 1 month    Joni Yeung)  Cardiology; Internal Medicine  [address], [address]  [place] [address]  Phone: [phone]  Fax: [phone]  Follow Up Time: 1 month  --------------------------------------------      MEDICAL PROGNOSIS: GOOD            REHAB POTENTIAL: GOOD             ESTIMATED DISPOSITION: HOME WITH HOME CARE            ELOS: 10-14 Days   EXPECTED THERAPY:     P.T. 1hr/day       O.T. 1hr/day      S.L.P. 1hr/day     P&O Unnecessary     EXP FREQUENCY: 5 days per 7 day period     PRESCREEN COMPARISON:   I have reviewed the prescreen information and I have found no relevant changes between the preadmission screening and my post admission evaluation     RATIONALE FOR INPATIENT ADMISSION - Patient demonstrates the following: (check all that apply)  [X] Medically appropriate for rehabilitation admission  [X] Has attainable rehab goals with an appropriate initial discharge plan  [X] Has rehabilitation potential (expected to make a significant improvement within a reasonable period of time)   [X] Requires close medical management by a rehab physician, rehab nursing care, Hospitalist and comprehensive interdisciplinary team (including PT, OT, & or SLP, Prosthetics and Orthotics)

## 2021-03-13 NOTE — H&P ADULT - NSHPLABSRESULTS_GEN_ALL_CORE
03-11    131<L>  |  90<L>  |  22  ----------------------------<  205<H>  3.9   |  23  |  0.54    Ca    10.7<H>      11 Mar 2021 05:37                                                16.6   9.98  )-----------( 244      ( 11 Mar 2021 05:37 )             48.2     CAPILLARY BLOOD GLUCOSE      POCT Blood Glucose.: 198 mg/dL (13 Mar 2021 11:52)  POCT Blood Glucose.: 256 mg/dL (13 Mar 2021 07:34)  POCT Blood Glucose.: 154 mg/dL (12 Mar 2021 21:05)  POCT Blood Glucose.: 402 mg/dL (12 Mar 2021 16:36)    CTH 3/7:  Acute hemorrhage involving the right thalamic region is again seen with intraventricular hemorrhage.    CTH 3/8: Slightly decreased size of right thalamic hemorrhage. Grossly stable intraventricular hemorrhage.    CT L-spine 3.12: -No acute fracture or dislocation.  -No evidence for spinal canal stenosis within the limits of CT. Consider MRI if further evaluation of the spinal canal soft tissues contents is required.

## 2021-03-13 NOTE — H&P ADULT - NSICDXFAMILYHX_GEN_ALL_CORE_FT
FAMILY HISTORY:  Sibling  Still living? Unknown  Family history of sudden cardiac death in brother, Age at diagnosis: 51-60

## 2021-03-13 NOTE — PROGRESS NOTE ADULT - ATTENDING COMMENTS
Advanced care planning was discussed with patient and family.  Advanced care planning forms were reviewed and discussed as appropriate.  Differential diagnosis and plan of care discussed with patient after the evaluation.   Pain assessed and judicious use of narcotics when appropriate was discussed.  Importance of Fall prevention discussed.  Counseling on Smoking and Alcohol cessation was offered when appropriate.  Counseling on Diet, exercise, and medication compliance was done.
Advanced care planning was discussed with patient and family.  Advanced care planning forms were reviewed and discussed as appropriate.  Differential diagnosis and plan of care discussed with patient after the evaluation.   Pain assessed and judicious use of narcotics when appropriate was discussed.  Importance of Fall prevention discussed.  Counseling on Smoking and Alcohol cessation was offered when appropriate.  Counseling on Diet, exercise, and medication compliance was done.
Sandro Moura MD  Vascular Neurology    seen in NSCU with stroke team.  can come to stroke unit when bed available
Sandro Moura MD  Vascular Neurology
Sandro Moura MD  Vascular Neurology  Office: [phone]
Sandro Moura MD  Vascular Neurology
Sandro Moura MD  Vascular Neurology  Office: [phone]
Advanced care planning was discussed with patient and family.  Advanced care planning forms were reviewed and discussed as appropriate.  Differential diagnosis and plan of care discussed with patient after the evaluation.   Pain assessed and judicious use of narcotics when appropriate was discussed.  Importance of Fall prevention discussed.  Counseling on Smoking and Alcohol cessation was offered when appropriate.  Counseling on Diet, exercise, and medication compliance was done.

## 2021-03-13 NOTE — H&P ADULT - NSICDXPASTMEDICALHX_GEN_ALL_CORE_FT
PAST MEDICAL HISTORY:  CAD (coronary artery disease) SP Stent 2019    DM (diabetes mellitus)     HLD (hyperlipidemia)     HTN (hypertension)     HTN (hypertension)

## 2021-03-13 NOTE — PROGRESS NOTE ADULT - PROVIDER SPECIALTY LIST ADULT
NSICU
NSICU
Neurology
Neurology
Neurosurgery
Neurology
Neurology
Neurosurgery
Rehab Medicine
NSICU
NSICU
Neurology
NSICU
Cardiology

## 2021-03-13 NOTE — H&P ADULT - NSHPREVIEWOFSYSTEMS_GEN_ALL_CORE
REVIEW OF SYSTEMS  Constitutional: No fever, No Chills, No fatigue  HEENT: No eye pain, No visual disturbances, No difficulty hearing, No Dysphagia   Pulm: No cough,  No shortness of breath  Cardio: No chest pain, No palpitations  GI:  No abdominal pain, No nausea, No vomiting, No diarrhea, No constipation  : No dysuria, No frequency, No hematuria,  Neuro: No headaches, No memory loss, No loss of strength, No numbness, No tremors  Skin: No itching, No rashes, No lesions   Endo: No temperature intolerance  MSK: No joint pain, No joint swelling, No muscle pain, No Neck or back pain  Psych:  No depression, No anxiety REVIEW OF SYSTEMS  Constitutional: No fever, No Chills, No fatigue  HEENT: +headache  Pulm: No cough,  No shortness of breath  Cardio: No chest pain, No palpitations  GI:  No abdominal pain, No nausea, No vomiting, No diarrhea, No constipation - last BM 3/13/21  : No dysuria, No frequency, No hematuria,  Neuro: +headaches, No memory loss, +loss of strength, No numbness, No tremors  Skin: No itching, No rashes, No lesions   Endo: No temperature intolerance  MSK: No joint pain, No joint swelling, No muscle pain, No Neck or back pain  Psych:  No depression, No anxiety

## 2021-03-13 NOTE — PATIENT PROFILE ADULT - VISION (WITH CORRECTIVE LENSES IF THE PATIENT USUALLY WEARS THEM):
[facility] Hospital Medicine  Progress Note    Patient Name: Shirley Metz  MRN: [id number]  Patient Class: IP- Inpatient   Admission Date: 8/21/2018  Length of Stay: 2 days  Attending Physician: Solomon Lujan MD  Primary Care Provider: Yandy Terrell MD        Subjective:     Principal Problem:Arthritis of left shoulder region    HPI:  Shirley Metz is an 85 year old female with CHF, hypertension and hyperlipidemia who presented for elective left total reverse shoulder arthroplasty performed by Dr. Lujan today. The patient tolerated the procedure well. She denies uncontrolled shoulder pain. Prior to surgery, the patient reports feeling well. She denies fever, cough, SOB and lower extremity edema. Code status was discussed with the patient. She is a full code. Her son, Boris Bettencourt is her medical power of .     Hospital Course:  Ms Bettencourt is a 85 year old female who underwent left reverse total shoulder arthroplasty by Dr Lujan on yesterday. She has experienced increase Left shoulder pain today. Patient received dose of Morphine today and Percocet dose was increased. Blood pressure has also been elevated, thought to be related to pain. Denies associated symptoms of chest pain, shortness of breath, fever or chills.     8/23/2018- Patient seen and examined today. Reports less pain to left shoulder. Vital signs more stable. Ambulated in valles with assistance. Will monitor. Plan discharge today.     Interval History: Patient seen and examined. Vital signs stable. Ambulated in valles with assistance. Abduction sling intact to left arm, Left shoulder dressing intact. Plan discharge today.     Review of Systems   Constitutional: Negative for chills and fever.   HENT: Negative for congestion, rhinorrhea and sinus pressure.    Respiratory: Negative for apnea, cough, choking, chest tightness, shortness of breath, wheezing and stridor.    Cardiovascular: Negative for chest  pain, palpitations and leg swelling.   Gastrointestinal: Negative for abdominal distention, abdominal pain, diarrhea, nausea and vomiting.   Endocrine: Negative for cold intolerance and heat intolerance.   Genitourinary: Negative for difficulty urinating and hematuria.   Musculoskeletal: Negative for arthralgias and joint swelling.        Left shoulder pain     Skin: Negative for color change, pallor and rash.   Neurological: Negative for dizziness, seizures, weakness, numbness and headaches.   Psychiatric/Behavioral: Negative for agitation. The patient is nervous/anxious.      Objective:     Vital Signs (Most Recent):  Temp: 98.5 °F (36.9 °C) (08/23/18 1143)  Pulse: 80 (08/23/18 1143)  Resp: 20 (08/23/18 1143)  BP: (!) 109/53 (08/23/18 1143)  SpO2: (!) 94 % (08/23/18 1143) Vital Signs (24h Range):  Temp:  [98.3 °F (36.8 °C)-99.9 °F (37.7 °C)] 98.5 °F (36.9 °C)  Pulse:  [80-96] 80  Resp:  [16-20] 20  SpO2:  [94 %-98 %] 94 %  BP: (109-186)/(53-72) 109/53     Weight: 76 kg (167 lb 8.8 oz)  Body mass index is 28.76 kg/m².    Intake/Output Summary (Last 24 hours) at 8/23/2018 1144  Last data filed at 8/23/2018 0452  Gross per 24 hour   Intake 540 ml   Output 900 ml   Net -360 ml      Physical Exam   HENT:   Head: Normocephalic and atraumatic.   Eyes: Right eye exhibits no discharge. Left eye exhibits no discharge.   Neck: No JVD present.   Cardiovascular: Exam reveals no gallop and no friction rub.   No murmur heard.  Pulmonary/Chest: No stridor. No respiratory distress. She has no wheezes. She has no rales. She exhibits no tenderness.   Abdominal: She exhibits no distension and no mass. There is no tenderness. There is no rebound and no guarding. No hernia.   Musculoskeletal: Normal range of motion. She exhibits no edema or deformity.   Lt arm abduction sling intact    Neurological: She is alert.   Skin: Skin is warm and dry.   Psychiatric: Her mood appears anxious.   Nursing note and vitals reviewed.      Significant  Labs:   CBC:   Recent Labs   Lab  08/22/18   0513   WBC  9.59   HGB  10.6*   HCT  31.6*   PLT  198     CMP:   Recent Labs   Lab  08/22/18 0513   NA  134*   K  3.9   CL  102   CO2  24   GLU  150*   BUN  29*   CREATININE  1.4   CALCIUM  9.0   ANIONGAP  8   EGFRNONAA  34*                 Assessment/Plan:      * Arthritis of left shoulder region    -S/P reverse total arthroplasty.   -Management per Orthopedics.     8/22  Patient seen and examined  Orthopedic following  Morphine 4 mg IV X 1  Percocet dose increased by orthopedic for better pain control.     8/23-  S/p Lt reverse total shoulder arthroplasty.   Seen and examined today.  Reports Lt shoulder pain decreased   Abduction sling intact              Heart failure with preserved left ventricular function (HFpEF)    -Chronic and appears compensated.   -Monitor volume status.   -Continue metoprolol.     8/22-  History of HFpEF  No sign of ADHF on exam   Monitor           Anxiety    Continue Xanax as needed.           HTN (hypertension)    Continue Dyazide, clonidine and losartan.    8/22-  Continue current medications  Elevated BP today due to pain, pain medications adjusted.  Monitor     8/23-   BP stable           VTE Risk Mitigation (From admission, onward)        Ordered     IP VTE LOW RISK PATIENT  Once      08/21/18 0725     Place sequential compression device  Until discontinued      08/21/18 0725              Milton Stanford NP  Department of Hospital Medicine   [facility]    Partially impaired: cannot see medication labels or newsprint, but can see obstacles in path, and the surrounding layout; can count fingers at arm's length

## 2021-03-13 NOTE — H&P ADULT - HISTORY OF PRESENT ILLNESS
PENNIE GIANG is a 56M with PMHx of HTN (non-compliant with medications) who presented to [facility] on 03/07/2021 with HA and imbalance s/p fall on 3/6. SBP elevated on admisison >220, thus started on nicardipine gtt; CT head findings c/w right thalamic hemorrhage with intraventricular hemorrhage. Cerebral angiography on 3/8 did not show evidence of AVM. Nicardipine weaned off and started on Clonidine, HCTZ, Nifedipine, and Losartan. Repeat CT head on 3/9 with stable findings. Plan for MRI brain in 4-6 weeks to rule out underlying vascular malformation.    Patient was evaluated by PM&R and therapy for functional deficits and gait/ ADL impairments and recommended acute rehabilitation. Patient was medically optimized for discharge to [facility] on 03/12/2021.     PENNIE GIANG is a 56M with PMHx of HTN (non-compliant with medications) who presented to [facility] on 03/07/2021 with HA and imbalance s/p fall on 3/6. SBP elevated on admisison >220, thus started on nicardipine gtt; CT head findings c/w right thalamic hemorrhage with intraventricular hemorrhage. Cerebral angiography on 3/8 did not show evidence of AVM. Nicardipine weaned off and started on Clonidine, HCTZ, Nifedipine, and Losartan. Repeat CT head on 3/9 with stable findings. Plan for MRI brain in 4-6 weeks to rule out underlying vascular malformation.    Patient was evaluated by PM&R and therapy for functional deficits and gait/ ADL impairments and recommended acute rehabilitation. Patient was medically optimized for discharge to [facility] on 03/12/2021.    On admission, patient noted to have temp 99.6F oral, 101F rectal. Patient denies feeling febrile, chills, SOB, dysuria, abd pain. COVID-19 PCR, UA and CXR ordered.

## 2021-03-13 NOTE — H&P ADULT - NSHPSOCIALHISTORY_GEN_ALL_CORE
SOCIAL HISTORY  Smoking - current smoker; 1/2 ppd  Lives in apartment with his wife; has 2STE without rails and 1 flight of stairs inside with bilateral rails.      FUNCTIONAL HISTORY  Previous Functional Status:  Independent in ambulation, ADL's, transfers prior to hospitalization    Current Functional Status:  Bed mobility:   Transfers: close supervision  Gait / ambulation: CG with RW; ambulates 18' x2  ADL's: CG-Cat toilet transfer  Speech: cognitive linguistic difficulties SOCIAL HISTORY  Smoking - current smoker; 1/2 ppd  Lives in apartment with his wife; has 2STE without rails and 1 flight of stairs inside with bilateral rails.      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 +driving  Independent in ambulation, ADL's, transfers prior to hospitalization    Current Functional Status:  Bed mobility: CS  Transfers: close supervision  Gait / ambulation: CG with RW; ambulates 18' x2  ADL's: CG-Cat toilet transfer  Speech: cognitive linguistic difficulties

## 2021-03-13 NOTE — PROGRESS NOTE ADULT - SUBJECTIVE AND OBJECTIVE BOX
Refill x 1per VO Dr Eugene  escribed   pt due for appt soon   THE PATIENT WAS SEEN AND EXAMINED BY ME WITH THE HOUSESTAFF AND STROKE TEAM DURING MORNING ROUNDS.   HPI:  56M here w/ headache, imbalance, fall from 1400 3/6. As per patient, had posterior distribution headache yesterday, moderate intensity. Felt dizzy, imbalanced, fell. Went to urgent care, found to have high BP prior to admission. [facility] for further workup. NIHSS: 0, MRS: 0, ICH: 2.    ROS: Otherwise negative.    SUBJECTIVE: No events overnight.  No new neurologic complaints.     ID#     acetaminophen   Tablet .. 650 milliGRAM(s) Oral every 6 hours PRN  aluminum hydroxide/magnesium hydroxide/simethicone Suspension 30 milliLiter(s) Oral every 4 hours PRN  atorvastatin 80 milliGRAM(s) Oral at bedtime  cloNIDine 0.3 milliGRAM(s) Oral three times a day  dextrose 40% Gel 15 Gram(s) Oral once  dextrose 50% Injectable 25 Gram(s) IV Push once  dextrose 50% Injectable 12.5 Gram(s) IV Push once  dextrose 50% Injectable 25 Gram(s) IV Push once  enoxaparin Injectable 40 milliGRAM(s) SubCutaneous <User Schedule>  folic acid 1 milliGRAM(s) Oral daily  glucagon  Injectable 1 milliGRAM(s) IntraMuscular once  hydrochlorothiazide 25 milliGRAM(s) Oral daily  insulin glargine Injectable (LANTUS) 8 Unit(s) SubCutaneous at bedtime  insulin lispro (ADMELOG) corrective regimen sliding scale   SubCutaneous three times a day before meals  insulin lispro (ADMELOG) corrective regimen sliding scale   SubCutaneous at bedtime  insulin lispro Injectable (ADMELOG) 5 Unit(s) SubCutaneous three times a day before meals  losartan 100 milliGRAM(s) Oral daily  multivitamin 1 Tablet(s) Oral daily  nicotine -  14 mG/24Hr(s) Patch 1 patch Transdermal daily  NIFEdipine XL 90 milliGRAM(s) Oral daily  polyethylene glycol 3350 17 Gram(s) Oral two times a day  senna 2 Tablet(s) Oral at bedtime  thiamine 100 milliGRAM(s) Oral daily  traMADol 25 milliGRAM(s) Oral every 6 hours PRN  traMADol 50 milliGRAM(s) Oral every 6 hours PRN      PHYSICAL EXAM:   Vital Signs Last 24 Hrs  T(C): 37.1 (13 Mar 2021 04:50), Max: 37.5 (13 Mar 2021 00:00)  T(F): 98.7 (13 Mar 2021 04:50), Max: 99.5 (13 Mar 2021 00:00)  HR: 79 (13 Mar 2021 04:50) (72 - 91)  BP: 137/89 (13 Mar 2021 04:50) (133/91 - 147/101)  RR: 18 (13 Mar 2021 04:50) (18 - 20)  SpO2: 98% (13 Mar 2021 04:50) (96% - 99%)    General: No acute distress  HEENT: EOM intact, visual fields full  Abdomen: Soft, nontender, nondistended   Extremities: No edema    NEUROLOGICAL EXAM:  Mental status: Awake, alert, oriented x3, speech fluent, follows commands, no neglect  Cranial Nerves: No facial asymmetry, no nystagmus, no dysarthria,  tongue midline  Motor exam: Normal tone, no drift, 5/5 RUE, 5/5 RLE, 5/5 LUE, 5/5 LLE  Sensation: Intact to light touch   Coordination/ Gait: ataxic gait with walker    IMAGING: Reviewed by me.     CT Head (03/09/21): Slightly decreased size of right thalamic hemorrhage. Grossly stable intraventricular hemorrhage.    IR Neuro:  Diagnostic cerebral angiogram demonstrating diffuse mild intracranial atherosclerotic disease without significant stenosis, and no evidence of aneurysm, arteriovenous malformation or fistula.    CT Head/ CTA H/N  (03/07/21):   Acute right thalamic hemorrhage measuring up to 2.4 cm with intraventricular extension. There is no midline shift.  Mild ventriculomegaly is suggested. Cannot rule out developing hydrocephalus.  Age-indeterminate infarcts involving bilateral basal ganglia and thalami.     THE PATIENT WAS SEEN AND EXAMINED BY ME WITH THE HOUSESTAFF AND STROKE TEAM DURING MORNING ROUNDS.   HPI:  56M here w/ headache, imbalance, fall from 1400 3/6. As per patient, had posterior distribution headache yesterday, moderate intensity. Felt dizzy, imbalanced, fell. Went to urgent care, found to have high BP prior to admission. [facility] for [facility] workup. NIHSS: 0, MRS: 0, ICH: 2.    ROS: Otherwise negative.    SUBJECTIVE: No events overnight.  No new neurologic complaints. States he feels better today.      ID# [id number]    acetaminophen   Tablet .. 650 milliGRAM(s) Oral every 6 hours PRN  aluminum hydroxide/magnesium hydroxide/simethicone Suspension 30 milliLiter(s) Oral every 4 hours PRN  atorvastatin 80 milliGRAM(s) Oral at bedtime  cloNIDine 0.3 milliGRAM(s) Oral three times a day  dextrose 40% Gel 15 Gram(s) Oral once  dextrose 50% Injectable 25 Gram(s) IV Push once  dextrose 50% Injectable 12.5 Gram(s) IV Push once  dextrose 50% Injectable 25 Gram(s) IV Push once  enoxaparin Injectable 40 milliGRAM(s) SubCutaneous <User Schedule>  folic acid 1 milliGRAM(s) Oral daily  glucagon  Injectable 1 milliGRAM(s) IntraMuscular once  hydrochlorothiazide 25 milliGRAM(s) Oral daily  insulin glargine Injectable (LANTUS) 8 Unit(s) SubCutaneous at bedtime  insulin lispro (ADMELOG) corrective regimen sliding scale   SubCutaneous three times a day before meals  insulin lispro (ADMELOG) corrective regimen sliding scale   SubCutaneous at bedtime  insulin lispro Injectable (ADMELOG) 5 Unit(s) SubCutaneous three times a day before meals  losartan 100 milliGRAM(s) Oral daily  multivitamin 1 Tablet(s) Oral daily  nicotine -  14 mG/24Hr(s) Patch 1 patch Transdermal daily  NIFEdipine XL 90 milliGRAM(s) Oral daily  polyethylene glycol 3350 17 Gram(s) Oral two times a day  senna 2 Tablet(s) Oral at bedtime  thiamine 100 milliGRAM(s) Oral daily  traMADol 25 milliGRAM(s) Oral every 6 hours PRN  traMADol 50 milliGRAM(s) Oral every 6 hours PRN      PHYSICAL EXAM:   Vital Signs Last 24 Hrs  T(C): 37.1 (13 Mar 2021 04:50), Max: 37.5 (13 Mar 2021 00:00)  T(F): 98.7 (13 Mar 2021 04:50), Max: 99.5 (13 Mar 2021 00:00)  HR: 79 (13 Mar 2021 04:50) (72 - 91)  BP: 137/89 (13 Mar 2021 04:50) (133/91 - 147/101)  RR: 18 (13 Mar 2021 04:50) (18 - 20)  SpO2: 98% (13 Mar 2021 04:50) (96% - 99%)    General: No acute distress  HEENT: EOM intact, visual fields full  Abdomen: Soft, nontender, nondistended   Extremities: No edema    NEUROLOGICAL EXAM:  Mental status: Awake, alert, oriented x3, speech fluent, follows commands, no neglect  Cranial Nerves: No facial asymmetry, no nystagmus, no dysarthria,  tongue midline  Motor exam: Normal tone, no drift, 5/5 RUE, 5/5 RLE, 5/5 LUE, 5/5 LLE  Sensation: Intact to light touch   Coordination/ Gait: ataxic gait with walker    IMAGING: Reviewed by me.     CT Head (03/09/21): Slightly decreased size of right thalamic hemorrhage. Grossly stable intraventricular hemorrhage.    IR Neuro:  Diagnostic cerebral angiogram demonstrating diffuse mild intracranial atherosclerotic disease without significant stenosis, and no evidence of aneurysm, arteriovenous malformation or fistula.    CT Head/ CTA H/N  (03/07/21):   Acute right thalamic hemorrhage measuring up to 2.4 cm with intraventricular extension. There is no midline shift.  Mild ventriculomegaly is suggested. Cannot rule out developing hydrocephalus.  Age-indeterminate infarcts involving bilateral basal ganglia and thalami.

## 2021-03-13 NOTE — H&P ADULT - ATTENDING COMMENTS
Seen and examined.  Agree w resident's note.  56M with PMHx of HTN (non-compliant with medications) who presented to [facility] on 03/07/2021 with HA and imbalance s/p fall on 3/6, admission SBP >220, found to have right thalamic hemorrhage with intraventricular extension.Admitted for multidisciplinary rehab program.  Patient febrile on admission but now afebrile.  Appreciate Hospitalist consult.  Stable to start rehabilitation program.

## 2021-03-14 LAB
ALBUMIN SERPL ELPH-MCNC: 4 G/DL — SIGNIFICANT CHANGE UP (ref 3.3–5)
ALP SERPL-CCNC: 103 U/L — SIGNIFICANT CHANGE UP (ref 40–120)
ALT FLD-CCNC: 42 U/L — SIGNIFICANT CHANGE UP (ref 10–45)
ANION GAP SERPL CALC-SCNC: 10 MMOL/L — SIGNIFICANT CHANGE UP (ref 5–17)
AST SERPL-CCNC: 32 U/L — SIGNIFICANT CHANGE UP (ref 10–40)
BASOPHILS # BLD AUTO: 0.05 K/UL — SIGNIFICANT CHANGE UP (ref 0–0.2)
BASOPHILS NFR BLD AUTO: 0.5 % — SIGNIFICANT CHANGE UP (ref 0–2)
BILIRUB SERPL-MCNC: 1 MG/DL — SIGNIFICANT CHANGE UP (ref 0.2–1.2)
BUN SERPL-MCNC: 31 MG/DL — HIGH (ref 7–23)
CALCIUM SERPL-MCNC: 10.1 MG/DL — SIGNIFICANT CHANGE UP (ref 8.4–10.5)
CHLORIDE SERPL-SCNC: 91 MMOL/L — LOW (ref 96–108)
CO2 SERPL-SCNC: 28 MMOL/L — SIGNIFICANT CHANGE UP (ref 22–31)
CREAT SERPL-MCNC: 0.87 MG/DL — SIGNIFICANT CHANGE UP (ref 0.5–1.3)
D DIMER BLD IA.RAPID-MCNC: <150 NG/ML DDU — SIGNIFICANT CHANGE UP
EOSINOPHIL # BLD AUTO: 0.09 K/UL — SIGNIFICANT CHANGE UP (ref 0–0.5)
EOSINOPHIL NFR BLD AUTO: 0.9 % — SIGNIFICANT CHANGE UP (ref 0–6)
GLUCOSE BLDC GLUCOMTR-MCNC: 200 MG/DL — HIGH (ref 70–99)
GLUCOSE BLDC GLUCOMTR-MCNC: 221 MG/DL — HIGH (ref 70–99)
GLUCOSE BLDC GLUCOMTR-MCNC: 228 MG/DL — HIGH (ref 70–99)
GLUCOSE BLDC GLUCOMTR-MCNC: 255 MG/DL — HIGH (ref 70–99)
GLUCOSE SERPL-MCNC: 217 MG/DL — HIGH (ref 70–99)
HCT VFR BLD CALC: 48.5 % — SIGNIFICANT CHANGE UP (ref 39–50)
HCV AB S/CO SERPL IA: 0.09 S/CO — SIGNIFICANT CHANGE UP (ref 0–0.99)
HCV AB SERPL-IMP: SIGNIFICANT CHANGE UP
HGB BLD-MCNC: 17.8 G/DL — HIGH (ref 13–17)
IMM GRANULOCYTES NFR BLD AUTO: 0.7 % — SIGNIFICANT CHANGE UP (ref 0–1.5)
LYMPHOCYTES # BLD AUTO: 2.36 K/UL — SIGNIFICANT CHANGE UP (ref 1–3.3)
LYMPHOCYTES # BLD AUTO: 24.6 % — SIGNIFICANT CHANGE UP (ref 13–44)
MCHC RBC-ENTMCNC: 32.2 PG — SIGNIFICANT CHANGE UP (ref 27–34)
MCHC RBC-ENTMCNC: 36.7 GM/DL — HIGH (ref 32–36)
MCV RBC AUTO: 87.9 FL — SIGNIFICANT CHANGE UP (ref 80–100)
MONOCYTES # BLD AUTO: 1.07 K/UL — HIGH (ref 0–0.9)
MONOCYTES NFR BLD AUTO: 11.2 % — SIGNIFICANT CHANGE UP (ref 2–14)
NEUTROPHILS # BLD AUTO: 5.94 K/UL — SIGNIFICANT CHANGE UP (ref 1.8–7.4)
NEUTROPHILS NFR BLD AUTO: 62.1 % — SIGNIFICANT CHANGE UP (ref 43–77)
NRBC # BLD: 0 /100 WBCS — SIGNIFICANT CHANGE UP (ref 0–0)
PLATELET # BLD AUTO: 268 K/UL — SIGNIFICANT CHANGE UP (ref 150–400)
POTASSIUM SERPL-MCNC: 3.3 MMOL/L — LOW (ref 3.5–5.3)
POTASSIUM SERPL-SCNC: 3.3 MMOL/L — LOW (ref 3.5–5.3)
PROT SERPL-MCNC: 8.1 G/DL — SIGNIFICANT CHANGE UP (ref 6–8.3)
RBC # BLD: 5.52 M/UL — SIGNIFICANT CHANGE UP (ref 4.2–5.8)
RBC # FLD: 12.3 % — SIGNIFICANT CHANGE UP (ref 10.3–14.5)
SODIUM SERPL-SCNC: 129 MMOL/L — LOW (ref 135–145)
WBC # BLD: 9.58 K/UL — SIGNIFICANT CHANGE UP (ref 3.8–10.5)
WBC # FLD AUTO: 9.58 K/UL — SIGNIFICANT CHANGE UP (ref 3.8–10.5)

## 2021-03-14 PROCEDURE — 93970 EXTREMITY STUDY: CPT | Mod: 26

## 2021-03-14 PROCEDURE — 99223 1ST HOSP IP/OBS HIGH 75: CPT

## 2021-03-14 PROCEDURE — 99223 1ST HOSP IP/OBS HIGH 75: CPT | Mod: GC

## 2021-03-14 RX ORDER — POTASSIUM CHLORIDE 20 MEQ
40 PACKET (EA) ORAL ONCE
Refills: 0 | Status: COMPLETED | OUTPATIENT
Start: 2021-03-14 | End: 2021-03-14

## 2021-03-14 RX ORDER — INSULIN GLARGINE 100 [IU]/ML
20 INJECTION, SOLUTION SUBCUTANEOUS AT BEDTIME
Refills: 0 | Status: DISCONTINUED | OUTPATIENT
Start: 2021-03-14 | End: 2021-03-21

## 2021-03-14 RX ORDER — LOSARTAN POTASSIUM 100 MG/1
100 TABLET, FILM COATED ORAL DAILY
Refills: 0 | Status: DISCONTINUED | OUTPATIENT
Start: 2021-03-14 | End: 2021-03-22

## 2021-03-14 RX ORDER — HYDROCHLOROTHIAZIDE 25 MG
25 TABLET ORAL DAILY
Refills: 0 | Status: DISCONTINUED | OUTPATIENT
Start: 2021-03-14 | End: 2021-03-15

## 2021-03-14 RX ORDER — LANOLIN ALCOHOL/MO/W.PET/CERES
6 CREAM (GRAM) TOPICAL AT BEDTIME
Refills: 0 | Status: DISCONTINUED | OUTPATIENT
Start: 2021-03-14 | End: 2021-03-31

## 2021-03-14 RX ORDER — NIFEDIPINE 30 MG
90 TABLET, EXTENDED RELEASE 24 HR ORAL DAILY
Refills: 0 | Status: DISCONTINUED | OUTPATIENT
Start: 2021-03-14 | End: 2021-03-24

## 2021-03-14 RX ORDER — METFORMIN HYDROCHLORIDE 850 MG/1
500 TABLET ORAL
Refills: 0 | Status: DISCONTINUED | OUTPATIENT
Start: 2021-03-14 | End: 2021-03-15

## 2021-03-14 RX ADMIN — Medication 2: at 11:39

## 2021-03-14 RX ADMIN — Medication 25 MILLIGRAM(S): at 06:44

## 2021-03-14 RX ADMIN — Medication 0.3 MILLIGRAM(S): at 21:24

## 2021-03-14 RX ADMIN — LOSARTAN POTASSIUM 100 MILLIGRAM(S): 100 TABLET, FILM COATED ORAL at 08:12

## 2021-03-14 RX ADMIN — Medication 1 TABLET(S): at 11:43

## 2021-03-14 RX ADMIN — METFORMIN HYDROCHLORIDE 500 MILLIGRAM(S): 850 TABLET ORAL at 17:34

## 2021-03-14 RX ADMIN — Medication 5 UNIT(S): at 08:05

## 2021-03-14 RX ADMIN — Medication 1: at 21:24

## 2021-03-14 RX ADMIN — ATORVASTATIN CALCIUM 80 MILLIGRAM(S): 80 TABLET, FILM COATED ORAL at 21:24

## 2021-03-14 RX ADMIN — Medication 40 MILLIEQUIVALENT(S): at 11:44

## 2021-03-14 RX ADMIN — Medication 0.3 MILLIGRAM(S): at 06:43

## 2021-03-14 RX ADMIN — Medication 100 MILLIGRAM(S): at 11:43

## 2021-03-14 RX ADMIN — Medication 650 MILLIGRAM(S): at 11:29

## 2021-03-14 RX ADMIN — Medication 1 PATCH: at 21:16

## 2021-03-14 RX ADMIN — Medication 1 MILLIGRAM(S): at 11:42

## 2021-03-14 RX ADMIN — Medication 90 MILLIGRAM(S): at 06:44

## 2021-03-14 RX ADMIN — Medication 4: at 17:33

## 2021-03-14 RX ADMIN — SENNA PLUS 2 TABLET(S): 8.6 TABLET ORAL at 21:24

## 2021-03-14 RX ADMIN — INSULIN GLARGINE 20 UNIT(S): 100 INJECTION, SOLUTION SUBCUTANEOUS at 21:23

## 2021-03-14 RX ADMIN — Medication 1 PATCH: at 11:40

## 2021-03-14 RX ADMIN — Medication 0.3 MILLIGRAM(S): at 13:53

## 2021-03-14 RX ADMIN — Medication 650 MILLIGRAM(S): at 12:00

## 2021-03-14 RX ADMIN — Medication 4: at 08:05

## 2021-03-14 RX ADMIN — Medication 6 MILLIGRAM(S): at 21:24

## 2021-03-14 RX ADMIN — ENOXAPARIN SODIUM 40 MILLIGRAM(S): 100 INJECTION SUBCUTANEOUS at 17:34

## 2021-03-14 NOTE — PROGRESS NOTE ADULT - SUBJECTIVE AND OBJECTIVE BOX
See H&P.  A.M. Labs                          17.8   9.58  )-----------( 268      ( 14 Mar 2021 06:00 )             48.5       03-14    129<L>  |  91<L>  |  31<H>  ----------------------------<  217<H>  3.3<L>   |  28  |  0.87    Ca    10.1      14 Mar 2021 06:00    TPro  8.1  /  Alb  4.0  /  TBili  1.0  /  DBili  x   /  AST  32  /  ALT  42  /  AlkPhos  103  03-14        - Appreciate Hospitalist Consult  - Monitor Na+  - Afebrile this morning- will continue to monitor.

## 2021-03-14 NOTE — CONSULT NOTE ADULT - SUBJECTIVE AND OBJECTIVE BOX
56M with PMHx of HTN (non-compliant with medications) who presented to [facility] on 2021 with HA and imbalance s/p fall on 3/6. SBP elevated on admission >220, thus started on nicardipine gtt; CT head findings c/w right thalamic hemorrhage with intraventricular hemorrhage. Cerebral angiography on 3/8 did not show evidence of AVM. Nicardipine weaned off and started on Clonidine, HCTZ, Nifedipine, and Losartan. Repeat CT head on 3/9 with stable findings. Plan for MRI brain in 4-6 weeks to rule out underlying vascular malformation.    Patient was evaluated by PM&R and therapy for functional deficits and gait/ ADL impairments and recommended acute rehabilitation. Patient was medically optimized for discharge to [facility] on 2021.    On admission, 3/13/21, patient noted to have temp 101F rectal, x 1 last evening, resolved since then  seen at the bedside, c/o headache 4/10, constant, relieved with sleeping and resting, no n/v, no sob, afebrile this am, no dysuria,  COVID-19 PCR repeat negative 3/13/21,    UA and CXR also negative  says he did not have diabetes, and was recently diagnosed with DM     Review of Systems: per hpi  all other negative        Allergies and Intolerances:        Allergies:  	No Known Allergies:     Home Medications:   * Patient Currently Takes Medications as of 12-Mar-2021 11:27 documented in Structured Notes  · 	folic acid 1 mg oral tablet: 1 tab(s) orally once a day  · 	Multiple Vitamins oral tablet: 1 tab(s) orally once a day  · 	polyethylene glycol 3350 oral powder for reconstitution: 17 gram(s) orally 2 times a day  · 	senna oral tablet: 2 tab(s) orally once a day (at bedtime)  · 	nicotine 14 mg/24 hr transdermal film, extended release: 1 patch transdermal once a day  · 	hydroCHLOROthiazide 25 mg oral tablet: 1 tab(s) orally once a day  · 	NIFEdipine 90 mg oral tablet, extended release: 1 tab(s) orally once a day  · 	atorvastatin 80 mg oral tablet: 1 tab(s) orally once a day (at bedtime)  · 	insulin lispro 100 units/mL injectable solution: 5 unit(s) injectable 3 times a day (before meals)  · 	insulin glargine: 8 unit(s) subcutaneous once a day (at bedtime)  · 	enoxaparin: 40 milligram(s) subcutaneous once a day (at bedtime)  · 	cloNIDine 0.3 mg oral tablet: 1 tab(s) orally 3 times a day  · 	aluminum hydroxide-magnesium hydroxide 200 mg-200 mg/5 mL oral suspension: 30 milliliter(s) orally every 4 hours, As needed, Dyspepsia  · 	losartan 100 mg oral tablet: 1 tab(s) orally once a day  · 	traMADol 50 mg oral tablet: 0.5 tab(s) orally every 6 hours, As needed, Moderate Pain (4 - 6)  · 	insulin lispro 100 units/mL subcutaneous solution: 2 Unit(s) if Glucose 151 - 200  	4 Unit(s) if Glucose 201 - 250  	6 Unit(s) if Glucose 251 - 300  	8 Unit(s) if Glucose 301 - 350  	10 Unit(s) if Glucose 351 - 400  	12 Unit(s) if Glucose Greater Than 400  	Three times a day before meals and before bedtime    .    Patient History:    Past Medical, Past Surgical, and Family History:  PAST MEDICAL HISTORY:  CAD (coronary artery disease) SP Stent 2019    DM (diabetes mellitus)     HLD (hyperlipidemia)     HTN (hypertension)     HTN (hypertension).     PAST SURGICAL HISTORY:  No significant past surgical history     No significant past surgical history.     FAMILY HISTORY:  Sibling  Still living? Unknown  Family history of sudden cardiac death in brother, Age at diagnosis: 51-60.     Social History: Smoking - current smoker; 1/2 ppd x >20 yrs  denies etoh       Heart Failure:  Does this patient have a history of or has been diagnosed with heart failure? no.    Vital Signs Last 24 Hrs  T(C): 36.9 (14 Mar 2021 05:28), Max: 38.3 (13 Mar 2021 16:14)  T(F): 98.5 (14 Mar 2021 05:28), Max: 101 (13 Mar 2021 16:14)  HR: 72 (14 Mar 2021 05:28) (72 - 86)  BP: 125/79 (14 Mar 2021 05:28) (125/79 - 145/86)  BP(mean): --  RR: 15 (14 Mar 2021 05:28) (15 - 18)  SpO2: 99% (14 Mar 2021 05:28) (97% - 99%)    GENERAL- NAD  EAR/NOSE/MOUTH/THROAT - no pharyngeal exudates, no oral leisions,  MMM  EYES- MAURY, conjunctiva and Sclera clear  NECK- supple  RESPIRATORY-  clear to auscultation bilaterally  CARDIOVASCULAR - SIS2, RRR  GI - soft NT BS present  EXTREMITIES- no pedal edema  NEUROLOGY- no gross focal deficits, no facial asymmetry.   SKIN- no rashes, warm to touch  PSYCHIATRY- AAO X 3  MUSCULOSKELETAL- ROM normal                  17.8                 129  | 28   | 31           9.58  >-----------< 268     ------------------------< 217                   48.5                 3.3  | 91   | 0.87                                         Ca 10.1  Mg x     Ph x      Urinalysis Basic - ( 13 Mar 2021 22:00 )    Color: Yellow / Appearance: Clear / S.025 / pH: x  Gluc: x / Ketone: Negative  / Bili: Negative / Urobili: Negative   Blood: x / Protein: 15 / Nitrite: Negative   Leuk Esterase: Negative / RBC: Negative /HPF / WBC Negative /HPF   Sq Epi: x / Non Sq Epi: Neg.-Few / Bacteria: Negative /HPF      CAPILLARY BLOOD GLUCOSE      POCT Blood Glucose.: 228 mg/dL (14 Mar 2021 08:01)  POCT Blood Glucose.: 294 mg/dL (13 Mar 2021 22:21)  POCT Blood Glucose.: 193 mg/dL (13 Mar 2021 16:34)  POCT Blood Glucose.: 198 mg/dL (13 Mar 2021 11:52)      A1C with Estimated Average Glucose Result: 8.7 % (21 @ 05:08)      Labs reviewed:     CXR personally reviewed: Clear lung fields bilaterally  < from: Xray Chest 1 View- PORTABLE-Urgent (21 @ 12:31) >    IMPRESSION:    Lungs are clear.    < end of copied text >      < from: CT Head No Cont (21 @ 08:24) >  IMPRESSION:  Slightly decreased size of right thalamic hemorrhage. Grossly stable intraventricular hemorrhage.    < end of copied text >    < from: CT Lumbar Spine No Cont (21 @ 08:55) >    IMPRESSION:  -No acute fracture or dislocation.    -No evidence for spinal canal stenosis within the limits of CT. Consider MRI if further evaluation of the spinal canal soft tissues contents is required.    < end of copied text >    < from: VA Duplex Lower Ext Vein Scan, Bilat (21 @ 12:09) >    IMPRESSION:  No evidence of deep venous thrombosis in either lower extremity.    < end of copied text >      ECG reviewed and interpreted: < from: 12 Lead ECG (21 @ 12:19) >    Ventricular Rate 104 BPM    Atrial Rate 104 BPM    P-R Interval 188 ms    QRS Duration 114 ms    Q-T Interval 370 ms    QTC Calculation(Bazett) 486 ms    P Axis 34 degrees    R Axis -19 degrees    T Axis -6 degrees    Diagnosis Line SINUS TACHYCARDIA  POSSIBLE LEFT ATRIAL ENLARGEMENT  INCOMPLETE RIGHT BUNDLE BRANCH BLOCK  LEFT VENTRICULAR HYPERTROPHY  ANTEROSEPTAL INFARCT , AGE UNDETERMINED  ABNORMAL ECG  NO PREVIOUS ECGS AVAILABLE    < end of copied text >        < from: Transthoracic Echocardiogram (21 @ 08:00) >  EF (Visual Estimate): 75-80 %    < end of copied text >  < from: Transthoracic Echocardiogram (21 @ 08:00) >  Conclusions:  1. Normal mitral valve. No systolic anterior motion of the  mitral valve leaflet. Minimal mitral regurgitation.  2. Moderate-severe concentric left ventricular hypertrophy.  Consider cMRI for further evaluation.  3. Hyperdynamic left ventricular systolic function. Peak  left ventricular outflow tract gradient equals 5 mm Hg.  4. Mild diastolic dysfunction (Stage I).  5. Normal right ventricular size and systolic function.  6. Agitated saline injection and color flow Doppler  demonstrates no evidence of a patent foramen ovale.    < end of copied text >   56M with PMHx of HTN (non-compliant with medications) who presented to [facility] on 2021 with HA and imbalance s/p fall on 3/6. SBP elevated on admission >220, thus started on nicardipine gtt; CT head findings c/w right thalamic hemorrhage with intraventricular hemorrhage. Cerebral angiography on 3/8 did not show evidence of AVM. Nicardipine weaned off and started on Clonidine, HCTZ, Nifedipine, and Losartan. Repeat CT head on 3/9 with stable findings. Plan for MRI brain in 4-6 weeks to rule out underlying vascular malformation.    Patient was evaluated by PM&R and therapy for functional deficits and gait/ ADL impairments and recommended acute rehabilitation. Patient was medically optimized for discharge to [facility] on 2021.    On admission, 3/13/21, patient noted to have temp 101F rectal, x 1 last evening, resolved since then  seen at the bedside, c/o headache 4/10, constant, relieved with sleeping and resting, no n/v, no sob, afebrile this am, no dysuria,  COVID-19 PCR repeat negative 3/13/21,    UA and CXR also negative  says he did not have diabetes, and was recently diagnosed with DM     Review of Systems: per hpi  all other negative        Allergies and Intolerances:        Allergies:  	No Known Allergies:     Home Medications:   * Patient Currently Takes Medications as of 12-Mar-2021 11:27 documented in Structured Notes  · 	folic acid 1 mg oral tablet: 1 tab(s) orally once a day  · 	Multiple Vitamins oral tablet: 1 tab(s) orally once a day  · 	polyethylene glycol 3350 oral powder for reconstitution: 17 gram(s) orally 2 times a day  · 	senna oral tablet: 2 tab(s) orally once a day (at bedtime)  · 	nicotine 14 mg/24 hr transdermal film, extended release: 1 patch transdermal once a day  · 	hydroCHLOROthiazide 25 mg oral tablet: 1 tab(s) orally once a day  · 	NIFEdipine 90 mg oral tablet, extended release: 1 tab(s) orally once a day  · 	atorvastatin 80 mg oral tablet: 1 tab(s) orally once a day (at bedtime)  · 	insulin lispro 100 units/mL injectable solution: 5 unit(s) injectable 3 times a day (before meals)  · 	insulin glargine: 8 unit(s) subcutaneous once a day (at bedtime)  · 	enoxaparin: 40 milligram(s) subcutaneous once a day (at bedtime)  · 	cloNIDine 0.3 mg oral tablet: 1 tab(s) orally 3 times a day  · 	aluminum hydroxide-magnesium hydroxide 200 mg-200 mg/5 mL oral suspension: 30 milliliter(s) orally every 4 hours, As needed, Dyspepsia  · 	losartan 100 mg oral tablet: 1 tab(s) orally once a day  · 	traMADol 50 mg oral tablet: 0.5 tab(s) orally every 6 hours, As needed, Moderate Pain (4 - 6)  · 	insulin lispro 100 units/mL subcutaneous solution: 2 Unit(s) if Glucose 151 - 200  	4 Unit(s) if Glucose 201 - 250  	6 Unit(s) if Glucose 251 - 300  	8 Unit(s) if Glucose 301 - 350  	10 Unit(s) if Glucose 351 - 400  	12 Unit(s) if Glucose Greater Than 400  	Three times a day before meals and before bedtime    .    Patient History:    Past Medical, Past Surgical, and Family History:  PAST MEDICAL HISTORY:  CAD (coronary artery disease) SP Stent 2019    DM (diabetes mellitus)     HLD (hyperlipidemia)     HTN (hypertension)     HTN (hypertension).     PAST SURGICAL HISTORY:  No significant past surgical history     No significant past surgical history.     FAMILY HISTORY:  Sibling  Still living? Unknown  Family history of sudden cardiac death in brother, Age at diagnosis: 51-60.  mom, dad alive in 90's  mom with dm, dad with HTN     Social History: Smoking - current smoker; 1/2 ppd x >20 yrs  denies etoh       Heart Failure:  Does this patient have a history of or has been diagnosed with heart failure? no.    Vital Signs Last 24 Hrs  T(C): 36.9 (14 Mar 2021 05:28), Max: 38.3 (13 Mar 2021 16:14)  T(F): 98.5 (14 Mar 2021 05:28), Max: 101 (13 Mar 2021 16:14)  HR: 72 (14 Mar 2021 05:28) (72 - 86)  BP: 125/79 (14 Mar 2021 05:28) (125/79 - 145/86)  BP(mean): --  RR: 15 (14 Mar 2021 05:28) (15 - 18)  SpO2: 99% (14 Mar 2021 05:28) (97% - 99%)    GENERAL- NAD  EAR/NOSE/MOUTH/THROAT - no pharyngeal exudates, no oral leisions,  MMM  EYES- MAURY, conjunctiva and Sclera clear  NECK- supple  RESPIRATORY-  clear to auscultation bilaterally  CARDIOVASCULAR - SIS2, RRR  GI - soft NT BS present  EXTREMITIES- no pedal edema  NEUROLOGY- no gross focal deficits, no facial asymmetry.   SKIN- no rashes, warm to touch  PSYCHIATRY- AAO X 3  MUSCULOSKELETAL- ROM normal                  17.8                 129  | 28   | 31           9.58  >-----------< 268     ------------------------< 217                   48.5                 3.3  | 91   | 0.87                                         Ca 10.1  Mg x     Ph x      Urinalysis Basic - ( 13 Mar 2021 22:00 )    Color: Yellow / Appearance: Clear / S.025 / pH: x  Gluc: x / Ketone: Negative  / Bili: Negative / Urobili: Negative   Blood: x / Protein: 15 / Nitrite: Negative   Leuk Esterase: Negative / RBC: Negative /HPF / WBC Negative /HPF   Sq Epi: x / Non Sq Epi: Neg.-Few / Bacteria: Negative /HPF      CAPILLARY BLOOD GLUCOSE      POCT Blood Glucose.: 228 mg/dL (14 Mar 2021 08:01)  POCT Blood Glucose.: 294 mg/dL (13 Mar 2021 22:21)  POCT Blood Glucose.: 193 mg/dL (13 Mar 2021 16:34)  POCT Blood Glucose.: 198 mg/dL (13 Mar 2021 11:52)      A1C with Estimated Average Glucose Result: 8.7 % (21 @ 05:08)      Labs reviewed:     CXR personally reviewed: Clear lung fields bilaterally  < from: Xray Chest 1 View- PORTABLE-Urgent (21 @ 12:31) >    IMPRESSION:    Lungs are clear.    < end of copied text >      < from: CT Head No Cont (21 @ 08:24) >  IMPRESSION:  Slightly decreased size of right thalamic hemorrhage. Grossly stable intraventricular hemorrhage.    < end of copied text >    < from: CT Lumbar Spine No Cont (21 @ 08:55) >    IMPRESSION:  -No acute fracture or dislocation.    -No evidence for spinal canal stenosis within the limits of CT. Consider MRI if further evaluation of the spinal canal soft tissues contents is required.    < end of copied text >    < from: VA Duplex Lower Ext Vein Scan, Bilat (21 @ 12:09) >    IMPRESSION:  No evidence of deep venous thrombosis in either lower extremity.    < end of copied text >      ECG reviewed and interpreted: < from: 12 Lead ECG (21 @ 12:19) >    Ventricular Rate 104 BPM    Atrial Rate 104 BPM    P-R Interval 188 ms    QRS Duration 114 ms    Q-T Interval 370 ms    QTC Calculation(Bazett) 486 ms    P Axis 34 degrees    R Axis -19 degrees    T Axis -6 degrees    Diagnosis Line SINUS TACHYCARDIA  POSSIBLE LEFT ATRIAL ENLARGEMENT  INCOMPLETE RIGHT BUNDLE BRANCH BLOCK  LEFT VENTRICULAR HYPERTROPHY  ANTEROSEPTAL INFARCT , AGE UNDETERMINED  ABNORMAL ECG  NO PREVIOUS ECGS AVAILABLE    < end of copied text >        < from: Transthoracic Echocardiogram (21 @ 08:00) >  EF (Visual Estimate): 75-80 %    < end of copied text >  < from: Transthoracic Echocardiogram (21 @ 08:00) >  Conclusions:  1. Normal mitral valve. No systolic anterior motion of the  mitral valve leaflet. Minimal mitral regurgitation.  2. Moderate-severe concentric left ventricular hypertrophy.  Consider cMRI for further evaluation.  3. Hyperdynamic left ventricular systolic function. Peak  left ventricular outflow tract gradient equals 5 mm Hg.  4. Mild diastolic dysfunction (Stage I).  5. Normal right ventricular size and systolic function.  6. Agitated saline injection and color flow Doppler  demonstrates no evidence of a patent foramen ovale.    < end of copied text >

## 2021-03-14 NOTE — CONSULT NOTE ADULT - ASSESSMENT
56M with PMHx of HTN (non-compliant with medications) who presented to [facility] on 03/07/2021 with HA and imbalance s/p fall on 3/6, admission SBP >220, found to have right thalamic hemorrhage with intraventricular extension.Admitted for multidisciplinary rehab program- pt/ot/dvt ppx    # fever- unclear etiology, 1 episode on admission rectal temp 101, resolved later, afebrile since, no other complaints, sepsis w/u sent, repeat labs no leukocytosis, covid, cxr and ua negative, will recheck doppler of LE, follow cultures, will monitor off abtx.     #Right thalamic hemorrhage with intraventricular extension  - likely 2/2 uncontrolled HTN, per chart patient non complaint with meds.  - s/p cerebral angiography-->no evidence of AVM; last CT on 3/9 was stable  - per neuro, MRI in 4-6 weeks to r/o vascular malformations  - BP control, as below    #DM2- uncontrolled  - iss, Accu-Cheks Lantus diabetic diet  - will adjust dose of lantus.  - was not taking any  meds for DM.  - A1C with Estimated Average Glucose Result: 8.7% (03.08.21 @ 05:08)      #HTN- placed on multiple antihypertensives, will monitor and adjust  as needed  - c/w Clonidine, HCTZ , Nifedipine, and Losartan     #HLD  - c/w Atorvastatin    #LBP  - CT lumbar spine negative for acute pathology    #Smoking history  - c/w Nicotine patch daily  - advised cessation    #insomnia- Melatonin PRN      #DVT prophylaxis- Lovenox     will follow  d/w rehab team 56M with PMHx of HTN (non-compliant with medications) who presented to [facility] on 03/07/2021 with HA and imbalance s/p fall on 3/6, admission SBP >220, found to have right thalamic hemorrhage with intraventricular extension.Admitted for multidisciplinary rehab program- pt/ot/dvt ppx    # fever- 1 episode on admission rectal temp 101, resolved later, afebrile since, no other complaints, sepsis w/u sent, repeat labs no leukocytosis, covid, cxr and ua negative, will recheck doppler of LE, follow cultures, will monitor off abtx.   - likely due to j/j vaccine patient received before d/c from [facility] on 3/12/21    #Right thalamic hemorrhage with intraventricular extension  - likely 2/2 uncontrolled HTN, per chart patient non complaint with meds.  - s/p cerebral angiography-->no evidence of AVM; last CT on 3/9 was stable  - per neuro, MRI in 4-6 weeks to r/o vascular malformations  - BP control, as below    #DM2- uncontrolled  - iss, Accu-Cheks Lantus diabetic diet  - will adjust dose of Lantus and add metformin 500 bid  - was not taking any  meds for DM.  - A1C with Estimated Average Glucose Result: 8.7% (03.08.21 @ 05:08)    # hyponatremia likely SIADH, monitor , check level in am    # hypokalemia monitor, replete, check lytes in am     #HTN- placed on multiple antihypertensives, will monitor and adjust  as needed  - c/w Clonidine, HCTZ , Nifedipine, and Losartan     #HLD  - c/w Atorvastatin    #LBP  - CT lumbar spine negative for acute pathology    #Smoking history  - c/w Nicotine patch daily  - advised cessation    #insomnia- Melatonin PRN      #DVT prophylaxis- Lovenox     will follow  d/w rehab team

## 2021-03-15 LAB
ANION GAP SERPL CALC-SCNC: 8 MMOL/L — SIGNIFICANT CHANGE UP (ref 5–17)
BUN SERPL-MCNC: 23 MG/DL — SIGNIFICANT CHANGE UP (ref 7–23)
CALCIUM SERPL-MCNC: 9.3 MG/DL — SIGNIFICANT CHANGE UP (ref 8.4–10.5)
CHLORIDE SERPL-SCNC: 89 MMOL/L — LOW (ref 96–108)
CO2 SERPL-SCNC: 30 MMOL/L — SIGNIFICANT CHANGE UP (ref 22–31)
CREAT SERPL-MCNC: 0.76 MG/DL — SIGNIFICANT CHANGE UP (ref 0.5–1.3)
GLUCOSE BLDC GLUCOMTR-MCNC: 148 MG/DL — HIGH (ref 70–99)
GLUCOSE BLDC GLUCOMTR-MCNC: 190 MG/DL — HIGH (ref 70–99)
GLUCOSE BLDC GLUCOMTR-MCNC: 216 MG/DL — HIGH (ref 70–99)
GLUCOSE BLDC GLUCOMTR-MCNC: 228 MG/DL — HIGH (ref 70–99)
GLUCOSE SERPL-MCNC: 187 MG/DL — HIGH (ref 70–99)
HCT VFR BLD CALC: 44.7 % — SIGNIFICANT CHANGE UP (ref 39–50)
HGB BLD-MCNC: 15.4 G/DL — SIGNIFICANT CHANGE UP (ref 13–17)
MAGNESIUM SERPL-MCNC: 2.1 MG/DL — SIGNIFICANT CHANGE UP (ref 1.6–2.6)
MCHC RBC-ENTMCNC: 30.3 PG — SIGNIFICANT CHANGE UP (ref 27–34)
MCHC RBC-ENTMCNC: 34.5 GM/DL — SIGNIFICANT CHANGE UP (ref 32–36)
MCV RBC AUTO: 88 FL — SIGNIFICANT CHANGE UP (ref 80–100)
NRBC # BLD: 0 /100 WBCS — SIGNIFICANT CHANGE UP (ref 0–0)
OSMOLALITY SERPL: 277 MOSMOL/KG — SIGNIFICANT CHANGE UP (ref 275–300)
PLATELET # BLD AUTO: 234 K/UL — SIGNIFICANT CHANGE UP (ref 150–400)
POTASSIUM SERPL-MCNC: 3.2 MMOL/L — LOW (ref 3.5–5.3)
POTASSIUM SERPL-SCNC: 3.2 MMOL/L — LOW (ref 3.5–5.3)
RBC # BLD: 5.08 M/UL — SIGNIFICANT CHANGE UP (ref 4.2–5.8)
RBC # FLD: 12 % — SIGNIFICANT CHANGE UP (ref 10.3–14.5)
SODIUM SERPL-SCNC: 127 MMOL/L — LOW (ref 135–145)
WBC # BLD: 9.74 K/UL — SIGNIFICANT CHANGE UP (ref 3.8–10.5)
WBC # FLD AUTO: 9.74 K/UL — SIGNIFICANT CHANGE UP (ref 3.8–10.5)

## 2021-03-15 PROCEDURE — 99232 SBSQ HOSP IP/OBS MODERATE 35: CPT | Mod: GC

## 2021-03-15 PROCEDURE — 99233 SBSQ HOSP IP/OBS HIGH 50: CPT

## 2021-03-15 RX ORDER — SODIUM CHLORIDE 9 MG/ML
1 INJECTION INTRAMUSCULAR; INTRAVENOUS; SUBCUTANEOUS
Refills: 0 | Status: DISCONTINUED | OUTPATIENT
Start: 2021-03-15 | End: 2021-03-22

## 2021-03-15 RX ORDER — POTASSIUM CHLORIDE 20 MEQ
40 PACKET (EA) ORAL EVERY 4 HOURS
Refills: 0 | Status: COMPLETED | OUTPATIENT
Start: 2021-03-15 | End: 2021-03-15

## 2021-03-15 RX ORDER — METFORMIN HYDROCHLORIDE 850 MG/1
1000 TABLET ORAL
Refills: 0 | Status: DISCONTINUED | OUTPATIENT
Start: 2021-03-15 | End: 2021-03-18

## 2021-03-15 RX ADMIN — Medication 40 MILLIEQUIVALENT(S): at 09:30

## 2021-03-15 RX ADMIN — Medication 1 MILLIGRAM(S): at 11:58

## 2021-03-15 RX ADMIN — Medication 1 PATCH: at 11:59

## 2021-03-15 RX ADMIN — ENOXAPARIN SODIUM 40 MILLIGRAM(S): 100 INJECTION SUBCUTANEOUS at 17:06

## 2021-03-15 RX ADMIN — Medication 40 MILLIEQUIVALENT(S): at 13:59

## 2021-03-15 RX ADMIN — ATORVASTATIN CALCIUM 80 MILLIGRAM(S): 80 TABLET, FILM COATED ORAL at 21:38

## 2021-03-15 RX ADMIN — Medication 0.3 MILLIGRAM(S): at 05:33

## 2021-03-15 RX ADMIN — TRAMADOL HYDROCHLORIDE 50 MILLIGRAM(S): 50 TABLET ORAL at 11:55

## 2021-03-15 RX ADMIN — Medication 1 TABLET(S): at 11:58

## 2021-03-15 RX ADMIN — TRAMADOL HYDROCHLORIDE 50 MILLIGRAM(S): 50 TABLET ORAL at 12:30

## 2021-03-15 RX ADMIN — Medication 1 PATCH: at 12:00

## 2021-03-15 RX ADMIN — METFORMIN HYDROCHLORIDE 1000 MILLIGRAM(S): 850 TABLET ORAL at 16:34

## 2021-03-15 RX ADMIN — TRAMADOL HYDROCHLORIDE 50 MILLIGRAM(S): 50 TABLET ORAL at 21:36

## 2021-03-15 RX ADMIN — Medication 0.3 MILLIGRAM(S): at 21:38

## 2021-03-15 RX ADMIN — Medication 650 MILLIGRAM(S): at 16:30

## 2021-03-15 RX ADMIN — TRAMADOL HYDROCHLORIDE 50 MILLIGRAM(S): 50 TABLET ORAL at 01:59

## 2021-03-15 RX ADMIN — Medication 4: at 07:29

## 2021-03-15 RX ADMIN — SODIUM CHLORIDE 1 GRAM(S): 9 INJECTION INTRAMUSCULAR; INTRAVENOUS; SUBCUTANEOUS at 17:06

## 2021-03-15 RX ADMIN — Medication 2: at 16:33

## 2021-03-15 RX ADMIN — Medication 90 MILLIGRAM(S): at 05:33

## 2021-03-15 RX ADMIN — Medication 4: at 12:02

## 2021-03-15 RX ADMIN — METFORMIN HYDROCHLORIDE 500 MILLIGRAM(S): 850 TABLET ORAL at 07:31

## 2021-03-15 RX ADMIN — INSULIN GLARGINE 20 UNIT(S): 100 INJECTION, SOLUTION SUBCUTANEOUS at 21:39

## 2021-03-15 RX ADMIN — Medication 650 MILLIGRAM(S): at 13:59

## 2021-03-15 RX ADMIN — Medication 1 PATCH: at 21:42

## 2021-03-15 RX ADMIN — Medication 6 MILLIGRAM(S): at 21:39

## 2021-03-15 RX ADMIN — Medication 25 MILLIGRAM(S): at 05:33

## 2021-03-15 RX ADMIN — Medication 100 MILLIGRAM(S): at 11:58

## 2021-03-15 RX ADMIN — LOSARTAN POTASSIUM 100 MILLIGRAM(S): 100 TABLET, FILM COATED ORAL at 07:31

## 2021-03-15 RX ADMIN — SENNA PLUS 2 TABLET(S): 8.6 TABLET ORAL at 21:38

## 2021-03-15 RX ADMIN — TRAMADOL HYDROCHLORIDE 50 MILLIGRAM(S): 50 TABLET ORAL at 22:36

## 2021-03-15 RX ADMIN — Medication 0.3 MILLIGRAM(S): at 13:59

## 2021-03-15 NOTE — DIETITIAN NUTRITION RISK NOTIFICATION - TREATMENT: THE FOLLOWING DIET HAS BEEN RECOMMENDED
Recommend Change Diet to Consistent Carbohydrate Low Sodium Diet & Recommend Initiate Glucerna 8oz PO BID

## 2021-03-15 NOTE — PROGRESS NOTE ADULT - ASSESSMENT
PENNIE GIANG is a 56M with PMHx of HTN (non-compliant with medications) who presented to [facility] on 03/07/2021 with HA and imbalance s/p fall on 3/6, admission SBP >220, found to have right thalamic hemorrhage with intraventricular extension. Admitted for multidisciplinary rehab program.      #Comprehensive Multidisciplinary Rehab Program:  - Gait, ADL, Functional impairments  - PT/OT/ SLP 3 hours a day 5 days a week    #Right thalamic hemorrhage with intraventricular extension  - likely 2/2 uncontrolled HTN  - s/p cerebral angiography-->no evidence of AVM; last CT on 3/9 was stable  - per neuro, MRI in 4-6 weeks to r/o vascular malformations  - BP control, as below    #Hyponatremia  - downtrending Na; DC HCTZ  - serum osm add-on, urine lytes    #HTN  - c/w Clonidine 0.3mg TID, HCTZ 25mg daily, Nifedipine 90mg daily, and Losartan 100mg daily    #Fever  - 3/13 oral temp 99.6F, rectal 101F on admission, other VSS  - s/p J&J vaccine prior to admission; ID w/u neg    #HLD  - c/w Atorvastatin 80mg qhs    #LBP  - CT lumbar spine negative for acute pathology  - pain, as below    #Smoking history  - c/w Nicotine patch daily    #Sleep:  - Maintain quiet hours and low stim environment  - Melatonin PRN    #Pain:  - Tylenol 650mg q6h PRN mild pain, Tramadol 25mg q6h PRN moderate pain, and Tramadol 50mg q6h PRN severe pain  - warm compress to low back PRN  - avoid sedating meds that may affect cognitive recovery    #GI/Bowel:  - Senna 2 tabs qhs and Miralax daily    #/Bladder:  - Monitor PVR if no void in 8h; SC for >400 cc  - Toileting schedule q4h    #Diet / Dysphagia:    - Diet: DASH/TLC and carb consistent  - ongoing SLP assessment  - Nutrition to follow    #Skin/ Pressure Injury Prevention:  - assessment on admission   - Incisions:  - Turn Q2hrs in bed while awake, OOB to Chair, PT/OT/SLP     #DVT prophylaxis:  - Lovenox 40mg daily  - SCDs    #Precautions/ Restrictions  - Falls  - COVID PCR:     --------------------------------------------  Outpatient Follow up:  Sandro Moura)  Neurology; Vascular Neurology  [address], [address]  [place] [address]  Phone: [phone]  Fax: [phone]  Follow Up Time: 1 month    Joni Yeung)  Cardiology; Internal Medicine  [address], [address]  [place] [address]  Phone: [phone]  Fax: [phone]  Follow Up Time: 1 month  --------------------------------------------      MEDICAL PROGNOSIS: GOOD            REHAB POTENTIAL: GOOD             ESTIMATED DISPOSITION: HOME WITH HOME CARE            ELOS: 10-14 Days   EXPECTED THERAPY:     P.T. 1hr/day       O.T. 1hr/day      S.L.P. 1hr/day     P&O Unnecessary     EXP FREQUENCY: 5 days per 7 day period     PRESCREEN COMPARISON:   I have reviewed the prescreen information and I have found no relevant changes between the preadmission screening and my post admission evaluation     RATIONALE FOR INPATIENT ADMISSION - Patient demonstrates the following: (check all that apply)  [X] Medically appropriate for rehabilitation admission  [X] Has attainable rehab goals with an appropriate initial discharge plan  [X] Has rehabilitation potential (expected to make a significant improvement within a reasonable period of time)   [X] Requires close medical management by a rehab physician, rehab nursing care, Hospitalist and comprehensive interdisciplinary team (including PT, OT, & or SLP, Prosthetics and Orthotics)

## 2021-03-15 NOTE — DIETITIAN INITIAL EVALUATION ADULT. - PHYSCIAL ASSESSMENT
Temporalis, Orbital Fat Pads, Buccal Fat Pads  & Hand (Interosseous) Wasting Observed  (Per Nutrition Focused Physical Exam)

## 2021-03-15 NOTE — DIETITIAN INITIAL EVALUATION ADULT. - EDUCATION DIETARY MODIFICATIONS
Education Provided on Need for Supplementation & Consistent Carbohydrate Diet/(2) meets goals/outcomes/verbalization

## 2021-03-15 NOTE — DIETITIAN INITIAL EVALUATION ADULT. - PERTINENT MEDS FT
Lovenox, Lantus, Metformin, Admelog, Lipitor, Tramadol, Catapress, Folic Acid, Multivitamin, Vitamin B1,

## 2021-03-15 NOTE — DIETITIAN INITIAL EVALUATION ADULT. - ORAL INTAKE PTA/DIET HISTORY
on Consistent Carbohydrate DASH-TLC Diet w/ Thin Liquids   Recommend Change Diet to Consistent Carbohydrate Low Sodium Diet & Recommend Initiate Glucerna 8oz PO BID  Education Provided on Need for Supplementation & Consistent Carbohydrate Diet    Patient Does Not Follow Diet @Home, Consumes 1 Meal a Day & Does Take Vitamin/Supplements @Home (MVI)

## 2021-03-15 NOTE — PROGRESS NOTE ADULT - SUBJECTIVE AND OBJECTIVE BOX
56 y o M with PMHx of HTN (non-compliant with medications) who presented to [facility] on 2021 with HA and imbalance s/p fall on 3/6. SBP elevated on admission >220,  CT head findings revealed right thalamic hemorrhage with intraventricular hemorrhage.   received j/j vaccine on 3/12/21    seen at the bedside, c/o pain in the back and B/L LE, since arrival, pain is rated at 8/10, aggravated by PT, and lifting legs, relieved to 3/10 with rest  no n/v, no sob, afebrile this am, no dysuria,      Vital Signs Last 24 Hrs  T(C): 36.6 (15 Mar 2021 07:23), Max: 36.8 (14 Mar 2021 19:40)  T(F): 97.8 (15 Mar 2021 07:23), Max: 98.2 (14 Mar 2021 19:40)  HR: 64 (15 Mar 2021 07:23) (64 - 83)  BP: 124/80 (15 Mar 2021 07:23) (112/76 - 124/80)  BP(mean): --  RR: 16 (15 Mar 2021 07:23) (16 - 16)  SpO2: 100% (15 Mar 2021 07:23) (97% - 100%)      GENERAL- NAD  EAR/NOSE/MOUTH/THROAT - no pharyngeal exudates, no oral lesions  MMM  EYES- MAURY, conjunctiva and Sclera clear  NECK- supple  RESPIRATORY-  clear to auscultation bilaterally  CARDIOVASCULAR - SIS2, RRR  GI - soft NT BS present  EXTREMITIES- no pedal edema  NEUROLOGY- no gross focal deficits, no facial asymmetry.   SKIN- no rashes, warm to touch  PSYCHIATRY- AAO X 3  MUSCULOSKELETAL- ROM normal                    15.4                 127  | 30   | 23           9.74  >-----------< 234     ------------------------< 187                   44.7                 3.2  | 89   | 0.76                                         Ca 9.3   Mg 2.1   Ph x      Urinalysis Basic - ( 13 Mar 2021 22:00 )    Color: Yellow / Appearance: Clear / S.025 / pH: x  Gluc: x / Ketone: Negative  / Bili: Negative / Urobili: Negative   Blood: x / Protein: 15 / Nitrite: Negative   Leuk Esterase: Negative / RBC: Negative /HPF / WBC Negative /HPF   Sq Epi: x / Non Sq Epi: Neg.-Few / Bacteria: Negative /HPF    CAPILLARY BLOOD GLUCOSE      POCT Blood Glucose.: 228 mg/dL (15 Mar 2021 12:01)  POCT Blood Glucose.: 216 mg/dL (15 Mar 2021 07:26)  POCT Blood Glucose.: 255 mg/dL (14 Mar 2021 21:12)  POCT Blood Glucose.: 221 mg/dL (14 Mar 2021 16:37)      MEDICATIONS  (STANDING):  atorvastatin 80 milliGRAM(s) Oral at bedtime  cloNIDine 0.3 milliGRAM(s) Oral three times a day  enoxaparin Injectable 40 milliGRAM(s) SubCutaneous <User Schedule>  folic acid 1 milliGRAM(s) Oral daily  glucagon  Injectable 1 milliGRAM(s) IntraMuscular once  influenza   Vaccine 0.5 milliLiter(s) IntraMuscular once  insulin glargine Injectable (LANTUS) 20 Unit(s) SubCutaneous at bedtime  insulin lispro (ADMELOG) corrective regimen sliding scale   SubCutaneous at bedtime  insulin lispro (ADMELOG) corrective regimen sliding scale   SubCutaneous three times a day before meals  losartan 100 milliGRAM(s) Oral daily  melatonin 6 milliGRAM(s) Oral at bedtime  metFORMIN 500 milliGRAM(s) Oral two times a day with meals  multivitamin 1 Tablet(s) Oral daily  nicotine -  14 mG/24Hr(s) Patch 1 patch Transdermal daily  NIFEdipine XL 90 milliGRAM(s) Oral daily  polyethylene glycol 3350 17 Gram(s) Oral daily  potassium chloride    Tablet ER 40 milliEquivalent(s) Oral every 4 hours  senna 2 Tablet(s) Oral at bedtime  thiamine 100 milliGRAM(s) Oral daily

## 2021-03-15 NOTE — DIETITIAN INITIAL EVALUATION ADULT. - PERTINENT LABORATORY DATA
3/14   Na-129L  K-33.3  BUN-31H  Creat-0.87  Gluc-217H  Hemoglobin A1c - 8.7H(on 3/8)  POCT (over Last 3 Days) - Ranging from 154-402

## 2021-03-15 NOTE — DIETITIAN INITIAL EVALUATION ADULT. - DIET TYPE
Education Provided on Need for Supplementation & Consistent Carbohydrate Diet/low sodium/supplement (specify)/consistent carbohydrate renal with evening snacks

## 2021-03-15 NOTE — PROGRESS NOTE ADULT - ATTENDING COMMENTS
Chart reviewed. Patient seen at bedside with resident Dr. Hiren Stokes  56 year old male with history as stated above admitted to rehab following recent hospitalisation for right thalamic Bleed with intraventricular extension.   This am patient reports right sided LBP ( has mild back pain in the past). Denies any numbness. Exam does not reveal any motor or sensory deficit  + SLR right > Left   Will monitor- Symptomatic treatment, modalities in therapy. Tyelnol/tramadol for pain   Begin full rehab program    2. Labs with worsening of hyponatremia- Urine studies ordered.   DC HCTZ, fluid restriction ordered by hospitalist- Case discussed

## 2021-03-15 NOTE — DIETITIAN INITIAL EVALUATION ADULT. - ADD RECOMMEND
1) Monitor Weights, Intake, Tolerance, Skin, POCT & Labwork  2) Recommend Change Diet to Consistent Carbohydrate Low Sodium Diet 3) Glucerna 8oz PO BID 4) Education Provided on Need for Supplementation & Consistent Carbohydrate Diet 5) Continue Nutrition Plan of Care

## 2021-03-15 NOTE — DIETITIAN INITIAL EVALUATION ADULT. - PERSON TAUGHT/METHOD
Education Provided on Need for Supplementation & Consistent Carbohydrate Diet/verbal instruction/patient instructed

## 2021-03-15 NOTE — PROGRESS NOTE ADULT - SUBJECTIVE AND OBJECTIVE BOX
DAILY PROGRESS NOTE:  HPI:  PENNIE GIANG is a 56M with PMHx of HTN (non-compliant with medications) who presented to [facility] on 2021 with HA and imbalance s/p fall on 3/6. SBP elevated on admisison >220, thus started on nicardipine gtt; CT head findings c/w right thalamic hemorrhage with intraventricular hemorrhage. Cerebral angiography on 3/8 did not show evidence of AVM. Nicardipine weaned off and started on Clonidine, HCTZ, Nifedipine, and Losartan. Repeat CT head on 3/9 with stable findings. Plan for MRI brain in 4-6 weeks to rule out underlying vascular malformation.    Patient was evaluated by PM&R and therapy for functional deficits and gait/ ADL impairments and recommended acute rehabilitation. Patient was medically optimized for discharge to [facility] on 2021.    On admission, patient noted to have temp 99.6F oral, 101F rectal. Patient denies feeling febrile, chills, SOB, dysuria, abd pain. COVID-19 PCR, UA and CXR ordered.  (13 Mar 2021 12:23)      Subjective:  Patient was seen and examined at the bedside this AM. No acute overnight events. C/o intermittent low back pain radiating to b/l thighs, R>L, exacerbated by movement. Endorses prior history of similar symptoms at home, but pain has been worse since being in the hospital. Typically subsides with rest. Currently rated as 8-9/10. Otherwise, reported no other complaints.     ROS:  (+) LBP with radiation ot b/l thighs. Denied HA, numbness, paresthesias, fever, chills, nausea, vomiting, CP, palpitations, coughing, wheezing, SOB, or abdominal pain. BM this morning.       Physical Exam:  Vital Signs Last 24 Hrs  T(C): 36.6 (15 Mar 2021 07:23), Max: 36.8 (14 Mar 2021 19:40)  T(F): 97.8 (15 Mar 2021 07:23), Max: 98.2 (14 Mar 2021 19:40)  HR: 64 (15 Mar 2021 07:23) (64 - 83)  BP: 124/80 (15 Mar 2021 07:23) (112/76 - 128/92)  RR: 16 (15 Mar 2021 07:23) (16 - 16)  SpO2: 100% (15 Mar 2021 07:23) (97% - 100%)      Gen - NAD, Comfortable  HEENT - NCAT, EOMI, MMM  Pulm - CTAB, No wheeze, No rhonchi  Cardiovascular - S1S2  Abdomen - Soft, NT/ND, +BS  Extremities - (+) straight leg b/l  Neuro-     Cognitive - awake and alert     Communication - Fluent, No dysarthria     Cranial Nerves - no facial asymmetry     Motor -                     LEFT    UE - ShAB 5/5, EF 5/5, EE 4/5, WE 4/5,  5/5                    RIGHT UE - ShAB 5/5, EF 5/5, EE 5/5, WE 5/5,  5/5                    LEFT    LE - HF 4/5, KE 5/5, DF 5/5, PF 5/5                    RIGHT LE - HF 5/5, KE 5/5, DF 5/5, PF 5/5     Psychiatric - Mood stable, Affect WNL      Recent Labs/Imaging:                        15.4   9.74  )-----------( 234      ( 15 Mar 2021 06:55 )             44.7     03-15    127<L>  |  89<L>  |  23  ----------------------------<  187<H>  3.2<L>   |  30  |  0.76    Ca    9.3      15 Mar 2021 06:55  Mg     2.1     03-15  TPro  8.1  /  Alb  4.0  /  TBili  1.0  /  DBili  x   /  AST  32  /  ALT  42  /  AlkPhos  103  03-14    Urinalysis Basic - ( 13 Mar 2021 22:00 )  Color: Yellow / Appearance: Clear / S.025 / pH: x  Gluc: x / Ketone: Negative  / Bili: Negative / Urobili: Negative   Blood: x / Protein: 15 / Nitrite: Negative   Leuk Esterase: Negative / RBC: Negative /HPF / WBC Negative /HPF   Sq Epi: x / Non Sq Epi: Neg.-Few / Bacteria: Negative /HPF    POCT Blood Glucose.: 216 mg/dL (03-15-21 @ 07:26)  POCT Blood Glucose.: 255 mg/dL (21 @ 21:12)  POCT Blood Glucose.: 221 mg/dL (21 @ 16:37)      Medications:  acetaminophen   Tablet .. 650 milliGRAM(s) Oral every 6 hours PRN  aluminum hydroxide/magnesium hydroxide/simethicone Suspension 30 milliLiter(s) Oral every 4 hours PRN  atorvastatin 80 milliGRAM(s) Oral at bedtime  cloNIDine 0.3 milliGRAM(s) Oral three times a day  dextrose 40% Gel 15 Gram(s) Oral once  dextrose 5%. 1000 milliLiter(s) IV Continuous <Continuous>  dextrose 5%. 1000 milliLiter(s) IV Continuous <Continuous>  dextrose 50% Injectable 25 Gram(s) IV Push once  dextrose 50% Injectable 12.5 Gram(s) IV Push once  dextrose 50% Injectable 25 Gram(s) IV Push once  enoxaparin Injectable 40 milliGRAM(s) SubCutaneous <User Schedule>  folic acid 1 milliGRAM(s) Oral daily  glucagon  Injectable 1 milliGRAM(s) IntraMuscular once  hydrochlorothiazide 25 milliGRAM(s) Oral daily  influenza   Vaccine 0.5 milliLiter(s) IntraMuscular once  insulin glargine Injectable (LANTUS) 20 Unit(s) SubCutaneous at bedtime  insulin lispro (ADMELOG) corrective regimen sliding scale   SubCutaneous at bedtime  insulin lispro (ADMELOG) corrective regimen sliding scale   SubCutaneous three times a day before meals  losartan 100 milliGRAM(s) Oral daily  melatonin 6 milliGRAM(s) Oral at bedtime  metFORMIN 500 milliGRAM(s) Oral two times a day with meals  multivitamin 1 Tablet(s) Oral daily  nicotine -  14 mG/24Hr(s) Patch 1 patch Transdermal daily  NIFEdipine XL 90 milliGRAM(s) Oral daily  polyethylene glycol 3350 17 Gram(s) Oral daily  potassium chloride    Tablet ER 40 milliEquivalent(s) Oral every 4 hours  senna 2 Tablet(s) Oral at bedtime  thiamine 100 milliGRAM(s) Oral daily  traMADol 25 milliGRAM(s) Oral every 6 hours PRN  traMADol 50 milliGRAM(s) Oral every 6 hours PRN

## 2021-03-15 NOTE — PROGRESS NOTE ADULT - ASSESSMENT
56M with PMHx of HTN (non-compliant with medications) who presented to [facility] on 03/07/2021 with HA and imbalance s/p fall on 3/6, admission SBP >220, found to have right thalamic hemorrhage with intraventricular extension.Admitted for multidisciplinary rehab program- pt/ot/dvt ppx    # fever- resolved, 1 episode on admission rectal temp 101, likely due to j/j vaccine patient received before d/c from [facility] on 3/12/21    #Right thalamic hemorrhage with intraventricular extension  - likely 2/2 uncontrolled HTN, per chart patient non complaint with meds.  - s/p cerebral angiography-->no evidence of AVM; last CT on 3/9 was stable  - per neuro, MRI in 4-6 weeks to r/o vascular malformations  - BP control, as below    #DM2- uncontrolled  - iss, Accu-Cheks Lantus diabetic diet  - increased  Lantus to 20 u 3/14/21, and added metformin 500 bid, tolerating well, will increase dose to 1gm bid today 3/15/21  - was not taking any  meds for DM at home  - A1C with Estimated Average Glucose Result: 8.7% (03.08.21 @ 05:08)    # hyponatremia likely SIADH, monitor - stop hctz, will add salt tabs for now,  reassess    # hypokalemia monitor, replete, check lytes in am , stop hctz    #HTN- placed on multiple antihypertensives, will monitor and adjust  as needed  - c/w Clonidine, Nifedipine, and Losartan   - stop hctz due to hyponatremia and hypokalemia    #HLD  - c/w Atorvastatin    #LBP  - CT lumbar spine negative for acute pathology    #Smoking history  - c/w Nicotine patch daily  - advised cessation    #insomnia- Melatonin PRN    #DVT prophylaxis- Lovenox     will follow  d/w dr. ness

## 2021-03-15 NOTE — DIETITIAN INITIAL EVALUATION ADULT. - OTHER INFO
Initial Nutrition Assessment   48yr Old Male   Dx: CVA  Diet - Consistent Carbohydrate DASH-TLC Diet w/ Thin Liquids  Recommend Change Diet to Consistent Carbohydrate  Low Sodium Diet & Recommend Initiate Glucerna 8oz PO BID  Denies Food Allergy/Intolerance  Tolerates Diet Well - No Chewing/Swallowing Complications (Per Patient)  No Pressure Ulcers (as Per Nursing Flow Sheets)  No Edema Noted (as Per Nursing Flow Sheets)  No Recent Nausea/Vomiting/Diarrhea/Constipation (as Per Patient)

## 2021-03-16 ENCOUNTER — TRANSCRIPTION ENCOUNTER (OUTPATIENT)
Age: 56
End: 2021-03-16

## 2021-03-16 LAB
ANION GAP SERPL CALC-SCNC: 6 MMOL/L — SIGNIFICANT CHANGE UP (ref 5–17)
BUN SERPL-MCNC: 20 MG/DL — SIGNIFICANT CHANGE UP (ref 7–23)
CALCIUM SERPL-MCNC: 9 MG/DL — SIGNIFICANT CHANGE UP (ref 8.4–10.5)
CHLORIDE SERPL-SCNC: 91 MMOL/L — LOW (ref 96–108)
CO2 SERPL-SCNC: 30 MMOL/L — SIGNIFICANT CHANGE UP (ref 22–31)
CREAT SERPL-MCNC: 0.76 MG/DL — SIGNIFICANT CHANGE UP (ref 0.5–1.3)
GLUCOSE BLDC GLUCOMTR-MCNC: 148 MG/DL — HIGH (ref 70–99)
GLUCOSE BLDC GLUCOMTR-MCNC: 164 MG/DL — HIGH (ref 70–99)
GLUCOSE BLDC GLUCOMTR-MCNC: 172 MG/DL — HIGH (ref 70–99)
GLUCOSE BLDC GLUCOMTR-MCNC: 176 MG/DL — HIGH (ref 70–99)
GLUCOSE SERPL-MCNC: 161 MG/DL — HIGH (ref 70–99)
OSMOLALITY SERPL: 275 MOSMOL/KG — SIGNIFICANT CHANGE UP (ref 275–300)
OSMOLALITY UR: 861 MOSM/KG — SIGNIFICANT CHANGE UP (ref 50–1200)
POTASSIUM SERPL-MCNC: 3.9 MMOL/L — SIGNIFICANT CHANGE UP (ref 3.5–5.3)
POTASSIUM SERPL-SCNC: 3.9 MMOL/L — SIGNIFICANT CHANGE UP (ref 3.5–5.3)
SODIUM SERPL-SCNC: 127 MMOL/L — LOW (ref 135–145)
SODIUM UR-SCNC: 26 MMOL/L — SIGNIFICANT CHANGE UP

## 2021-03-16 PROCEDURE — 99233 SBSQ HOSP IP/OBS HIGH 50: CPT

## 2021-03-16 PROCEDURE — 99232 SBSQ HOSP IP/OBS MODERATE 35: CPT | Mod: GC

## 2021-03-16 RX ORDER — GABAPENTIN 400 MG/1
100 CAPSULE ORAL AT BEDTIME
Refills: 0 | Status: DISCONTINUED | OUTPATIENT
Start: 2021-03-16 | End: 2021-03-18

## 2021-03-16 RX ADMIN — TRAMADOL HYDROCHLORIDE 50 MILLIGRAM(S): 50 TABLET ORAL at 12:00

## 2021-03-16 RX ADMIN — Medication 1 TABLET(S): at 12:00

## 2021-03-16 RX ADMIN — TRAMADOL HYDROCHLORIDE 50 MILLIGRAM(S): 50 TABLET ORAL at 12:45

## 2021-03-16 RX ADMIN — SENNA PLUS 2 TABLET(S): 8.6 TABLET ORAL at 21:00

## 2021-03-16 RX ADMIN — Medication 100 MILLIGRAM(S): at 12:00

## 2021-03-16 RX ADMIN — Medication 6 MILLIGRAM(S): at 21:00

## 2021-03-16 RX ADMIN — GABAPENTIN 100 MILLIGRAM(S): 400 CAPSULE ORAL at 21:01

## 2021-03-16 RX ADMIN — Medication 2: at 16:52

## 2021-03-16 RX ADMIN — Medication 650 MILLIGRAM(S): at 10:18

## 2021-03-16 RX ADMIN — METFORMIN HYDROCHLORIDE 1000 MILLIGRAM(S): 850 TABLET ORAL at 17:29

## 2021-03-16 RX ADMIN — Medication 90 MILLIGRAM(S): at 05:21

## 2021-03-16 RX ADMIN — SODIUM CHLORIDE 1 GRAM(S): 9 INJECTION INTRAMUSCULAR; INTRAVENOUS; SUBCUTANEOUS at 05:22

## 2021-03-16 RX ADMIN — TRAMADOL HYDROCHLORIDE 50 MILLIGRAM(S): 50 TABLET ORAL at 19:15

## 2021-03-16 RX ADMIN — Medication 0.3 MILLIGRAM(S): at 05:22

## 2021-03-16 RX ADMIN — POLYETHYLENE GLYCOL 3350 17 GRAM(S): 17 POWDER, FOR SOLUTION ORAL at 11:59

## 2021-03-16 RX ADMIN — Medication 1 MILLIGRAM(S): at 12:00

## 2021-03-16 RX ADMIN — Medication 1 PATCH: at 12:00

## 2021-03-16 RX ADMIN — Medication 2: at 08:06

## 2021-03-16 RX ADMIN — METFORMIN HYDROCHLORIDE 1000 MILLIGRAM(S): 850 TABLET ORAL at 08:07

## 2021-03-16 RX ADMIN — ENOXAPARIN SODIUM 40 MILLIGRAM(S): 100 INJECTION SUBCUTANEOUS at 17:29

## 2021-03-16 RX ADMIN — TRAMADOL HYDROCHLORIDE 50 MILLIGRAM(S): 50 TABLET ORAL at 06:46

## 2021-03-16 RX ADMIN — Medication 1 PATCH: at 06:45

## 2021-03-16 RX ADMIN — Medication 1 PATCH: at 11:59

## 2021-03-16 RX ADMIN — ATORVASTATIN CALCIUM 80 MILLIGRAM(S): 80 TABLET, FILM COATED ORAL at 21:00

## 2021-03-16 RX ADMIN — Medication 0.3 MILLIGRAM(S): at 21:00

## 2021-03-16 RX ADMIN — TRAMADOL HYDROCHLORIDE 50 MILLIGRAM(S): 50 TABLET ORAL at 05:22

## 2021-03-16 RX ADMIN — INSULIN GLARGINE 20 UNIT(S): 100 INJECTION, SOLUTION SUBCUTANEOUS at 21:01

## 2021-03-16 RX ADMIN — LOSARTAN POTASSIUM 100 MILLIGRAM(S): 100 TABLET, FILM COATED ORAL at 05:21

## 2021-03-16 RX ADMIN — Medication 650 MILLIGRAM(S): at 11:00

## 2021-03-16 RX ADMIN — Medication 2: at 12:00

## 2021-03-16 RX ADMIN — Medication 1 PATCH: at 19:32

## 2021-03-16 RX ADMIN — TRAMADOL HYDROCHLORIDE 50 MILLIGRAM(S): 50 TABLET ORAL at 18:45

## 2021-03-16 RX ADMIN — SODIUM CHLORIDE 1 GRAM(S): 9 INJECTION INTRAMUSCULAR; INTRAVENOUS; SUBCUTANEOUS at 17:29

## 2021-03-16 NOTE — PROGRESS NOTE ADULT - SUBJECTIVE AND OBJECTIVE BOX
56 y o M with PMHx of HTN (non-compliant with medications) who presented to [facility] on 03/07/2021 with HA and imbalance s/p fall on 3/6. SBP elevated on admission >220,  CT head findings revealed right thalamic hemorrhage with intraventricular hemorrhage.   received j/j vaccine on 3/12/21    seen at the bedside, c/o pain in the back and B/L LE, 8/10, aggravated lifting legs, relieved to 3/10 with rest  no n/v, no sob,   afebrile, no dysuria,      Vital Signs Last 24 Hrs  T(C): 36.4 (16 Mar 2021 08:50), Max: 36.5 (15 Mar 2021 21:32)  T(F): 97.6 (16 Mar 2021 08:50), Max: 97.7 (15 Mar 2021 21:32)  HR: 69 (16 Mar 2021 08:50) (69 - 79)  BP: 123/90 (16 Mar 2021 08:50) (123/90 - 147/87)  BP(mean): --  RR: 18 (16 Mar 2021 08:50) (16 - 18)  SpO2: 100% (16 Mar 2021 08:50) (100% - 100%)      GENERAL- NAD  EAR/NOSE/MOUTH/THROAT - no pharyngeal exudates, no oral lesions  MMM  EYES- MAURY, conjunctiva and Sclera clear  NECK- supple  RESPIRATORY-  clear to auscultation bilaterally  CARDIOVASCULAR - SIS2, RRR  GI - soft NT BS present  EXTREMITIES- no pedal edema  NEUROLOGY- no gross focal deficits, no facial asymmetry.   SKIN- no rashes, warm to touch  PSYCHIATRY- AAO X 3  MUSCULOSKELETAL- ROM normal                  x                    127  | 30   | 20           x     >-----------< x       ------------------------< 161                   x                    3.9  | 91   | 0.76                                         Ca 9.0   Mg x     Ph x          CAPILLARY BLOOD GLUCOSE      POCT Blood Glucose.: 172 mg/dL (16 Mar 2021 11:52)  POCT Blood Glucose.: 176 mg/dL (16 Mar 2021 08:04)  POCT Blood Glucose.: 148 mg/dL (15 Mar 2021 21:34)  POCT Blood Glucose.: 190 mg/dL (15 Mar 2021 16:29)  POCT Blood Glucose.: 228 mg/dL (15 Mar 2021 12:01)      MEDICATIONS  (STANDING):  atorvastatin 80 milliGRAM(s) Oral at bedtime  cloNIDine 0.3 milliGRAM(s) Oral three times a day  enoxaparin Injectable 40 milliGRAM(s) SubCutaneous <User Schedule>  folic acid 1 milliGRAM(s) Oral daily  glucagon  Injectable 1 milliGRAM(s) IntraMuscular once  influenza   Vaccine 0.5 milliLiter(s) IntraMuscular once  insulin glargine Injectable (LANTUS) 20 Unit(s) SubCutaneous at bedtime  insulin lispro (ADMELOG) corrective regimen sliding scale   SubCutaneous three times a day before meals  insulin lispro (ADMELOG) corrective regimen sliding scale   SubCutaneous at bedtime  losartan 100 milliGRAM(s) Oral daily  melatonin 6 milliGRAM(s) Oral at bedtime  metFORMIN 1000 milliGRAM(s) Oral two times a day with meals  multivitamin 1 Tablet(s) Oral daily  nicotine -  14 mG/24Hr(s) Patch 1 patch Transdermal daily  NIFEdipine XL 90 milliGRAM(s) Oral daily  polyethylene glycol 3350 17 Gram(s) Oral daily  senna 2 Tablet(s) Oral at bedtime  sodium chloride 1 Gram(s) Oral two times a day  thiamine 100 milliGRAM(s) Oral daily    MEDICATIONS  (PRN):  acetaminophen   Tablet .. 650 milliGRAM(s) Oral every 6 hours PRN Temp greater or equal to 38C (100.4F), Mild Pain (1 - 3)  aluminum hydroxide/magnesium hydroxide/simethicone Suspension 30 milliLiter(s) Oral every 4 hours PRN Dyspepsia  traMADol 25 milliGRAM(s) Oral every 6 hours PRN Moderate Pain (4 - 6)  traMADol 50 milliGRAM(s) Oral every 6 hours PRN Severe Pain (7 - 10)

## 2021-03-16 NOTE — DISCHARGE NOTE PROVIDER - DETAILS OF MALNUTRITION DIAGNOSIS/DIAGNOSES
This patient has been assessed with a concern for Malnutrition and was treated during this hospitalization for the following Nutrition diagnosis/diagnoses:     -  03/15/2021: Moderate protein-calorie malnutrition   This patient has been assessed with a concern for Malnutrition and was treated during this hospitalization for the following Nutrition diagnosis/diagnoses:     -  03/15/2021: Moderate protein-calorie malnutrition    This patient has been assessed with a concern for Malnutrition and was treated during this hospitalization for the following Nutrition diagnosis/diagnoses:     -  03/15/2021: Moderate protein-calorie malnutrition

## 2021-03-16 NOTE — DISCHARGE NOTE PROVIDER - PROVIDER TOKENS
PROVIDER:[TOKEN:[[id number]]],PROVIDER:[TOKEN:[[id number]]],FREE:[LAST:[Physiatry],PHONE:[phone]],FAX:[(   )    -],ADDRESS:[[address] [place] [address], [address] [place] [address]]] PROVIDER:[TOKEN:[[id number]],FOLLOWUP:[1 week]],PROVIDER:[TOKEN:[[id number]],FOLLOWUP:[2 weeks]],FREE:[LAST:[Primary Care],FIRST:[Physician],PHONE:[(   )    -],FAX:[(   )    -],FOLLOWUP:[1 week]],PROVIDER:[TOKEN:[[id number]],FOLLOWUP:[1 month]]

## 2021-03-16 NOTE — PROGRESS NOTE ADULT - ATTENDING COMMENTS
Chart reviewed. Patient seen at bedside  Arabic translation used #[id number]  Patient continues to report LBP and radiating down to RLE- posterior part. . Denies any numbness. Motor exam normal. But patient with episode of buckling in PT - will CT LS spine.     Team conference 3/16/21: Goals of supervision to mod I- TDD 3/27 based on progress    2. Hospitalist consult noted    3. Hyponatremia: urine studies pending Chart reviewed. Patient seen at bedside  Bulgarian translation used #[id number]  Patient continues to report LBP and radiating down to RLE- posterior part. . Denies any numbness. Motor exam normal. But patient with episode of buckling in PT - Patient had a CT LS spine on 3/12- that was negative for acute fracture or spinal stenosis. Start gabapentin for radicular pain. If symptoms persist and patient unable to show progress in therapy will order MRI      Team conference 3/16/21: Goals of supervision to mod I- TDD 3/27 based on progress    2. Hospitalist consult noted    3. Hyponatremia: urine studies pending

## 2021-03-16 NOTE — PROGRESS NOTE ADULT - SUBJECTIVE AND OBJECTIVE BOX
DAILY PROGRESS NOTE:  HPI:  PENNIE GIANG is a 56M with PMHx of HTN (non-compliant with medications) who presented to [facility] on 03/07/2021 with HA and imbalance s/p fall on 3/6. SBP elevated on admisison >220, thus started on nicardipine gtt; CT head findings c/w right thalamic hemorrhage with intraventricular hemorrhage. Cerebral angiography on 3/8 did not show evidence of AVM. Nicardipine weaned off and started on Clonidine, HCTZ, Nifedipine, and Losartan. Repeat CT head on 3/9 with stable findings. Plan for MRI brain in 4-6 weeks to rule out underlying vascular malformation.    Patient was evaluated by PM&R and therapy for functional deficits and gait/ ADL impairments and recommended acute rehabilitation. Patient was medically optimized for discharge to [facility] on 03/12/2021.    On admission, patient noted to have temp 99.6F oral, 101F rectal. Patient denies feeling febrile, chills, SOB, dysuria, abd pain. COVID-19 PCR, UA and CXR ordered.  (13 Mar 2021 12:23)      Subjective:  Patient was seen and examined at the bedside this AM; [facility] Interpret[employer] ID [id number] provided Albanian translations. No acute overnight events. Endorses LBP radiating down the back of b/l LEs. Pain relieved by tramadol and Tylenol; patient unsure if heat compress with therapy has helped.     ROS:  (+) LBP radiating to b/l LEs; denied numbness or tingling. Denied fever, chills, nausea, vomiting, CP, palpitations, coughing, wheezing, SOB, or abdominal pain.      Physical Exam:  Vital Signs Last 24 Hrs  T(C): 36.4 (16 Mar 2021 08:50), Max: 36.5 (15 Mar 2021 21:32)  T(F): 97.6 (16 Mar 2021 08:50), Max: 97.7 (15 Mar 2021 21:32)  HR: 69 (16 Mar 2021 08:50) (69 - 79)  BP: 123/90 (16 Mar 2021 08:50) (123/90 - 147/87)  RR: 18 (16 Mar 2021 08:50) (16 - 18)  SpO2: 100% (16 Mar 2021 08:50) (100% - 100%)      Gen - NAD, Comfortable  HEENT - NCAT, EOMI, MMM  Pulm - CTAB, No wheeze, No rhonchi  Cardiovascular - S1S2  Abdomen - Soft, NT/ND, +BS  Extremities - (+) straight leg b/l  Neuro-     Cognitive - awake and alert     Communication - Fluent, No dysarthria     Cranial Nerves - no facial asymmetry     Motor -                     LEFT    UE - ShAB 5/5, EF 5/5, EE 4/5, WE 4/5,  5/5                    RIGHT UE - ShAB 5/5, EF 5/5, EE 5/5, WE 5/5,  5/5                    LEFT    LE - HF 4/5, KE 5/5, DF 5/5, PF 5/5                    RIGHT LE - HF 5/5, KE 5/5, DF 5/5, PF 5/5     Psychiatric - Mood stable, Affect WNL      Recent Labs/Imaging:                        15.4   9.74  )-----------( 234      ( 15 Mar 2021 06:55 )             44.7     03-16    127<L>  |  91<L>  |  20  ----------------------------<  161<H>  3.9   |  30  |  0.76    Ca    9.0      16 Mar 2021 06:15  Mg     2.1     03-15    POCT Blood Glucose.: 176 mg/dL (03-16-21 @ 08:04)  POCT Blood Glucose.: 148 mg/dL (03-15-21 @ 21:34)  POCT Blood Glucose.: 190 mg/dL (03-15-21 @ 16:29)  POCT Blood Glucose.: 228 mg/dL (03-15-21 @ 12:01)      Medications:  acetaminophen   Tablet .. 650 milliGRAM(s) Oral every 6 hours PRN  aluminum hydroxide/magnesium hydroxide/simethicone Suspension 30 milliLiter(s) Oral every 4 hours PRN  atorvastatin 80 milliGRAM(s) Oral at bedtime  cloNIDine 0.3 milliGRAM(s) Oral three times a day  dextrose 40% Gel 15 Gram(s) Oral once  dextrose 5%. 1000 milliLiter(s) IV Continuous <Continuous>  dextrose 5%. 1000 milliLiter(s) IV Continuous <Continuous>  dextrose 50% Injectable 25 Gram(s) IV Push once  dextrose 50% Injectable 12.5 Gram(s) IV Push once  dextrose 50% Injectable 25 Gram(s) IV Push once  enoxaparin Injectable 40 milliGRAM(s) SubCutaneous <User Schedule>  folic acid 1 milliGRAM(s) Oral daily  glucagon  Injectable 1 milliGRAM(s) IntraMuscular once  influenza   Vaccine 0.5 milliLiter(s) IntraMuscular once  insulin glargine Injectable (LANTUS) 20 Unit(s) SubCutaneous at bedtime  insulin lispro (ADMELOG) corrective regimen sliding scale   SubCutaneous three times a day before meals  insulin lispro (ADMELOG) corrective regimen sliding scale   SubCutaneous at bedtime  losartan 100 milliGRAM(s) Oral daily  melatonin 6 milliGRAM(s) Oral at bedtime  metFORMIN 1000 milliGRAM(s) Oral two times a day with meals  multivitamin 1 Tablet(s) Oral daily  nicotine -  14 mG/24Hr(s) Patch 1 patch Transdermal daily  NIFEdipine XL 90 milliGRAM(s) Oral daily  polyethylene glycol 3350 17 Gram(s) Oral daily  senna 2 Tablet(s) Oral at bedtime  sodium chloride 1 Gram(s) Oral two times a day  thiamine 100 milliGRAM(s) Oral daily  traMADol 25 milliGRAM(s) Oral every 6 hours PRN  traMADol 50 milliGRAM(s) Oral every 6 hours PRN

## 2021-03-16 NOTE — DISCHARGE NOTE PROVIDER - CARE PROVIDER_API CALL
Sandro Moura)  Neurology; Vascular Neurology  [address] [facility], [address]  [place] [address]  Phone: [phone]  Fax: [phone]  Follow Up Time:     Joni Yeung ()  Cardiology; Internal Medicine  [address], [address]  [place] [address]  Phone: [phone]  Fax: [phone]  Follow Up Time:     Physiatry,   [address] [facility], [address] [place] [address]  Phone: [phone]  Fax: (   )    -  Follow Up Time:    Sandro Moura)  Neurology; Vascular Neurology  [address] [facility], [address]  [place] [address]  Phone: [phone]  Fax: [phone]  Follow Up Time: 1 week    Joni Yeung ()  Cardiology; Internal Medicine  [address], [address]  [place] [address]  Phone: [phone]  Fax: [phone]  Follow Up Time: 2 weeks    Primary Care, Physician  Phone: (   )    -  Fax: (   )    -  Follow Up Time: 1 week    Lianna Roberts)  Brain Injury Medicine; PhysicalRehab Medicine  [address] [place] [address]  Phone: [phone]  Fax: [phone]  Follow Up Time: 1 month

## 2021-03-16 NOTE — PROGRESS NOTE ADULT - ASSESSMENT
56M with PMHx of HTN (non-compliant with medications) who presented to [facility] on 03/07/2021 with HA and imbalance s/p fall on 3/6, admission SBP >220, found to have right thalamic hemorrhage with intraventricular extension.Admitted for multidisciplinary rehab program- pt/ot/dvt ppx    #Right thalamic hemorrhage with intraventricular extension  - likely 2/2 uncontrolled HTN, per chart patient non complaint with meds.  - s/p cerebral angiography-->no evidence of AVM; last CT on 3/9 was stable  - per neuro, MRI in 4-6 weeks to r/o vascular malformations  - BP control, as below    #DM2- uncontrolled  - iss, Accu-Cheks Lantus diabetic diet  - C/W  Lantus 20 u AND METFORMIN 1gm bid  - was not taking any  meds for DM at home  - A1C with Estimated Average Glucose Result: 8.7% (03.08.21 @ 05:08)    # hyponatremia likely SIADH, monitor - stop hctz, C/W salt tabs for now,  reassess    # hypokalemia monitor, replete, check lytes in am , stop hctz    #HTN- placed on multiple antihypertensives, will monitor and adjust  as needed  - c/w Clonidine, Nifedipine, and Losartan   - stop hctz due to hyponatremia and hypokalemia  - mag normal 3/15/21    #HLD  - c/w Atorvastatin    #LBP  - CT lumbar spine negative for acute pathology    #Smoking history  - c/w Nicotine patch daily  - advised cessation    #insomnia- Melatonin PRN    #DVT prophylaxis- Lovenox     will follow  d/w dr. ness

## 2021-03-16 NOTE — DISCHARGE NOTE PROVIDER - NSDCCPCAREPLAN_GEN_ALL_CORE_FT
PRINCIPAL DISCHARGE DIAGNOSIS  Diagnosis: Thalamic hemorrhage  Assessment and Plan of Treatment: You were admitted to [facility] for rehab after having a hemorrhagic stroke. You tolerated a course of rehab and are now ready to go home. After beign discharged, please continue to take your medications, as prescribed and have close follow up with your cardiologist and neurologist.      SECONDARY DISCHARGE DIAGNOSES  Diagnosis: Low back pain  Assessment and Plan of Treatment:      PRINCIPAL DISCHARGE DIAGNOSIS  Diagnosis: Thalamic hemorrhage  Assessment and Plan of Treatment: You were admitted to [facility] for rehab after having a hemorrhagic stroke. You tolerated a course of rehab and are now ready to go home. After beign discharged, please continue to take your medications, as prescribed and have close follow up with your cardiologist and neurologist.      SECONDARY DISCHARGE DIAGNOSES  Diagnosis: Low back pain  Assessment and Plan of Treatment: You were experiencing low back pain since prior to your admission to [facility]. A CT scan of your low back was performed at [facility] was negative for fractures, disclocations, or spinal canal stenosis. You were started on a Lidocaine patch, lidocaine gel, and a medication, called Gabapentin for nerve pain. After being discharged, you should continue to take Gabapentin, as prescribed.     PRINCIPAL DISCHARGE DIAGNOSIS  Diagnosis: Thalamic hemorrhage  Assessment and Plan of Treatment: You were admitted to [facility] for rehab after having a hemorrhagic stroke. You tolerated a course of rehab and are now ready to go home. After beign discharged, please continue to take your medications, as prescribed and have close follow up with your cardiologist and neurologist.      SECONDARY DISCHARGE DIAGNOSES  Diagnosis: Low back pain  Assessment and Plan of Treatment: You were experiencing low back pain since prior to your admission to [facility]. A CT scan of your low back was performed at [facility] was negative for fractures, disclocations, or spinal canal stenosis. You were started on a Lidocaine patch, lidocaine gel, and a medication, called Gabapentin for nerve pain. After being discharged, you should continue to take Gabapentin, as prescribed. In case your pain worsens, we have provided you the contact information for a [facility] outpatient office that can help manage your pain.     PRINCIPAL DISCHARGE DIAGNOSIS  Diagnosis: Thalamic hemorrhage  Assessment and Plan of Treatment: You were admitted to [facility] for rehab after having a hemorrhagic stroke. You tolerated a course of rehab and are now ready to go home. After beign discharged, please continue to take your medications, as prescribed and have close follow up with your cardiologist and neurologist.      SECONDARY DISCHARGE DIAGNOSES  Diagnosis: Low back pain  Assessment and Plan of Treatment: You were experiencing low back pain since prior to your admission to [facility]. A CT scan of your low back was performed at [facility] was negative for fractures, disclocations, or spinal canal stenosis. You were started on a Lidocaine patch, lidocaine gel, and a medication, called Gabapentin for nerve pain. After being discharged, you should continue to take Gabapentin, as prescribed. In case your pain worsens, we have provided you the contact information for a [facility] outpatient office that can help manage your pain.    Diagnosis: Encounter for smoking cessation counseling  Assessment and Plan of Treatment: You were started on Nicotine patches to help you quit smoking. We have been gradually decreasing the amount of nicotine you have been receiving to help you quit. You will be discharged with a 2 week supply of Nicotine patches. Please apply once per day for 2 weeks and then, discontinue. If you are having trouble quitting smoking despite the nicotine patches, please do not hesitate to seek help from your PCP.

## 2021-03-16 NOTE — DISCHARGE NOTE PROVIDER - NSDCFUADDAPPT_GEN_ALL_CORE_FT
Please follow up with your PCP in 1 week. Your PCP will need to continue all your medications as needed. Your prescriptions from us are only for one month

## 2021-03-16 NOTE — DISCHARGE NOTE PROVIDER - HOSPITAL COURSE
PENNIE GIANG is a 56M with PMHx of HTN (non-compliant with medications) who presented to [facility] on 03/07/2021 with HA and imbalance s/p fall on 3/6. SBP elevated on admisison >220, thus started on nicardipine gtt; CT head findings c/w right thalamic hemorrhage with intraventricular hemorrhage. Cerebral angiography on 3/8 did not show evidence of AVM. Nicardipine weaned off and started on Clonidine, HCTZ, Nifedipine, and Losartan. Repeat CT head on 3/9 with stable findings. Plan for MRI brain in 4-6 weeks to rule out underlying vascular malformation.    Patient was evaluated by PM&R and therapy for functional deficits and gait/ ADL impairments and recommended acute rehabilitation. Patient was medically optimized for discharge to [facility] on 03/12/2021.    Patient was stable upon rehab admission to  [facility]. Admitted with gait instabilty, ADL, and functional impairments.     Rehab Course significant for fever on admission, thought to be 2/2 receiving J&J COVID-19 vaccine prior to discharge from [facility]; ID workup was negative.    All other medical co-morbidities were stable.     Patient tolerated course of inpatient PT/OT/SLP rehab with significant functional improvements and met rehab goals prior to discharge. Patient was medically cleared on ___  for discharged to ___     Patient will follow up with the following: PENNIE GIANG is a 56M with PMHx of HTN (non-compliant with medications) who presented to [facility] on 03/07/2021 with HA and imbalance s/p fall on 3/6. SBP elevated on admisison >220, thus started on nicardipine gtt; CT head findings c/w right thalamic hemorrhage with intraventricular hemorrhage. Cerebral angiography on 3/8 did not show evidence of AVM. Nicardipine weaned off and started on Clonidine, HCTZ, Nifedipine, and Losartan. Repeat CT head on 3/9 with stable findings. Plan for MRI brain in 4-6 weeks to rule out underlying vascular malformation.    Patient was evaluated by PM&R and therapy for functional deficits and gait/ ADL impairments and recommended acute rehabilitation. Patient was medically optimized for discharge to [facility] on 03/12/2021.    Patient was stable upon rehab admission to  [facility]patient [facility]. Admitted with gait instabilty, ADL, and functional impairments.     Rehab Course significant for fever on admission, thought to be 2/2 receiving J&J COVID-19 vaccine prior to discharge from [facility]; ID workup was negative. Course c/b LBP radiating to b/l LEs; started on Lidocaine patch, lidocaine gel, and gabapentin qhs with subsequent improvement in pain. All other medical co-morbidities were stable. Patient tolerated course of inpatient PT/OT/SLP rehab with significant functional improvements and met rehab goals prior to discharge. Caregiver training was completed on 3/26. Patient was medically cleared on 03/31/2021 for discharged to home.    Patient will follow up with the following:   Cardiology  Neurology  PCP

## 2021-03-16 NOTE — DISCHARGE NOTE PROVIDER - NSDCMRMEDTOKEN_GEN_ALL_CORE_FT
aluminum hydroxide-magnesium hydroxide 200 mg-200 mg/5 mL oral suspension: 30 milliliter(s) orally every 4 hours, As needed, Dyspepsia  atorvastatin 80 mg oral tablet: 1 tab(s) orally once a day (at bedtime)  cloNIDine 0.3 mg oral tablet: 1 tab(s) orally 3 times a day  enoxaparin: 40 milligram(s) subcutaneous once a day (at bedtime)  folic acid 1 mg oral tablet: 1 tab(s) orally once a day  hydroCHLOROthiazide 25 mg oral tablet: 1 tab(s) orally once a day  insulin glargine: 8 unit(s) subcutaneous once a day (at bedtime)  insulin lispro 100 units/mL injectable solution: 5 unit(s) injectable 3 times a day (before meals)  insulin lispro 100 units/mL subcutaneous solution: 2 Unit(s) if Glucose 151 - 200  4 Unit(s) if Glucose 201 - 250  6 Unit(s) if Glucose 251 - 300  8 Unit(s) if Glucose 301 - 350  10 Unit(s) if Glucose 351 - 400  12 Unit(s) if Glucose Greater Than 400  Three times a day before meals and before bedtime  losartan 100 mg oral tablet: 1 tab(s) orally once a day  Multiple Vitamins oral tablet: 1 tab(s) orally once a day  nicotine 14 mg/24 hr transdermal film, extended release: 1 patch transdermal once a day  NIFEdipine 90 mg oral tablet, extended release: 1 tab(s) orally once a day  polyethylene glycol 3350 oral powder for reconstitution: 17 gram(s) orally 2 times a day  senna oral tablet: 2 tab(s) orally once a day (at bedtime)  traMADol 50 mg oral tablet: 0.5 tab(s) orally every 6 hours, As needed, Moderate Pain (4 - 6)   acetaminophen 325 mg oral tablet: 2 tab(s) orally every 6 hours, As needed, Temp greater or equal to 38C (100.4F), Mild Pain (1 - 3)  folic acid 1 mg oral tablet: 1 tab(s) orally once a day  Multiple Vitamins oral tablet: 1 tab(s) orally once a day   acetaminophen 325 mg oral tablet: 2 tab(s) orally every 6 hours, As needed, Temp greater or equal to 38C (100.4F), Mild Pain (1 - 3)  atorvastatin 80 mg oral tablet: 1 tab(s) orally once a day (at bedtime)  cloNIDine 0.1 mg oral tablet: 1 tab(s) orally 2 times a day    DO NOT STOP ABRUPTLY  folic acid 1 mg oral tablet: 1 tab(s) orally once a day  gabapentin 300 mg oral capsule: 1 cap(s) orally once a day (at bedtime)  losartan 50 mg oral tablet: 1 tab(s) orally once a day  metFORMIN 850 mg oral tablet: 1 tab(s) orally 2 times a day (with meals)  Multiple Vitamins oral tablet: 1 tab(s) orally once a day  nicotine 7 mg/24 hr transdermal film, extended release: 1 patch transdermal once a day   NIFEdipine 30 mg oral tablet, extended release: 1 tab(s) orally once a day

## 2021-03-16 NOTE — DISCHARGE NOTE PROVIDER - CARE PROVIDERS DIRECT ADDRESSES
,DirectAddress_Unknown,DirectAddress_Unknown,DirectAddress_Unknown ,DirectAddress_Unknown,DirectAddress_Unknown,DirectAddress_Unknown,[email or web address]

## 2021-03-16 NOTE — PROGRESS NOTE ADULT - ASSESSMENT
PENNIE GIANG is a 56M with PMHx of HTN (non-compliant with medications) who presented to  on 03/07/2021 with HA and imbalance s/p fall on 3/6, admission SBP >220, found to have right thalamic hemorrhage with intraventricular extension. Admitted for multidisciplinary rehab program.      #Comprehensive Multidisciplinary Rehab Program:  - Gait, ADL, Functional impairments  - PT/OT/ SLP 3 hours a day 5 days a week    #Right thalamic hemorrhage with intraventricular extension  - likely 2/2 uncontrolled HTN  - s/p cerebral angiography-->no evidence of AVM; last CT on 3/9 was stable  - per neuro, MRI in 4-6 weeks to r/o vascular malformations  - BP control, as below    #LBP  - symptoms radicular in nature; denies numbness or paresthesias  - plan to start Gabapentin 100mg qhs    #Hyponatremia  - overall downtrending Na; DC HCTZ  - serum osm 277, urine lytes pending    #HTN  - c/w Clonidine 0.3mg TID, HCTZ 25mg daily, Nifedipine 90mg daily, and Losartan 100mg daily    #Fever  - 3/13 oral temp 99.6F, rectal 101F on admission, other VSS  - s/p J&J vaccine prior to admission; ID w/u neg    #HLD  - c/w Atorvastatin 80mg qhs    #LBP  - CT lumbar spine negative for acute pathology  - pain, as below    #Smoking history  - c/w Nicotine patch daily    #Sleep:  - Maintain quiet hours and low stim environment  - Melatonin PRN    #Pain:  - Tylenol 650mg q6h PRN mild pain, Tramadol 25mg q6h PRN moderate pain, and Tramadol 50mg q6h PRN severe pain  - warm compress to low back PRN  - avoid sedating meds that may affect cognitive recovery    #GI/Bowel:  - Senna 2 tabs qhs and Miralax daily    #/Bladder:  - Monitor PVR if no void in 8h; SC for >400 cc  - Toileting schedule q4h    #Diet / Dysphagia:    - Diet: DASH/TLC and carb consistent  - ongoing SLP assessment  - Nutrition to follow    #Skin/ Pressure Injury Prevention:  - assessment on admission   - Incisions:  - Turn Q2hrs in bed while awake, OOB to Chair, PT/OT/SLP     #DVT prophylaxis:  - Lovenox 40mg daily  - SCDs    #Precautions/ Restrictions  - Falls  - COVID PCR:     #Dispo: IDR 03/16/2021  - OT: setup for eating and grooming; Cat with UE/LE dressing; Cat toilet transfer and bathing  - PT: Cat for transfers; ambulates 40' with RW Cat  - SLP: continuing for cognitive/executive function  - Barriers: left inattention  - TDD: 3/27 to home

## 2021-03-17 LAB
GLUCOSE BLDC GLUCOMTR-MCNC: 146 MG/DL — HIGH (ref 70–99)
GLUCOSE BLDC GLUCOMTR-MCNC: 147 MG/DL — HIGH (ref 70–99)
GLUCOSE BLDC GLUCOMTR-MCNC: 174 MG/DL — HIGH (ref 70–99)
GLUCOSE BLDC GLUCOMTR-MCNC: 185 MG/DL — HIGH (ref 70–99)

## 2021-03-17 PROCEDURE — 99232 SBSQ HOSP IP/OBS MODERATE 35: CPT

## 2021-03-17 PROCEDURE — 99233 SBSQ HOSP IP/OBS HIGH 50: CPT

## 2021-03-17 RX ORDER — LACTOBACILLUS ACIDOPHILUS 100MM CELL
1 CAPSULE ORAL DAILY
Refills: 0 | Status: DISCONTINUED | OUTPATIENT
Start: 2021-03-17 | End: 2021-03-31

## 2021-03-17 RX ADMIN — LOSARTAN POTASSIUM 100 MILLIGRAM(S): 100 TABLET, FILM COATED ORAL at 05:43

## 2021-03-17 RX ADMIN — Medication 0.3 MILLIGRAM(S): at 22:09

## 2021-03-17 RX ADMIN — Medication 0.3 MILLIGRAM(S): at 05:43

## 2021-03-17 RX ADMIN — Medication 1 PATCH: at 12:17

## 2021-03-17 RX ADMIN — Medication 1 PATCH: at 19:51

## 2021-03-17 RX ADMIN — ATORVASTATIN CALCIUM 80 MILLIGRAM(S): 80 TABLET, FILM COATED ORAL at 22:09

## 2021-03-17 RX ADMIN — METFORMIN HYDROCHLORIDE 1000 MILLIGRAM(S): 850 TABLET ORAL at 17:42

## 2021-03-17 RX ADMIN — Medication 0.3 MILLIGRAM(S): at 13:08

## 2021-03-17 RX ADMIN — TRAMADOL HYDROCHLORIDE 50 MILLIGRAM(S): 50 TABLET ORAL at 13:30

## 2021-03-17 RX ADMIN — Medication 90 MILLIGRAM(S): at 05:43

## 2021-03-17 RX ADMIN — GABAPENTIN 100 MILLIGRAM(S): 400 CAPSULE ORAL at 22:09

## 2021-03-17 RX ADMIN — TRAMADOL HYDROCHLORIDE 50 MILLIGRAM(S): 50 TABLET ORAL at 12:45

## 2021-03-17 RX ADMIN — Medication 2: at 12:18

## 2021-03-17 RX ADMIN — TRAMADOL HYDROCHLORIDE 50 MILLIGRAM(S): 50 TABLET ORAL at 19:52

## 2021-03-17 RX ADMIN — TRAMADOL HYDROCHLORIDE 50 MILLIGRAM(S): 50 TABLET ORAL at 19:04

## 2021-03-17 RX ADMIN — TRAMADOL HYDROCHLORIDE 50 MILLIGRAM(S): 50 TABLET ORAL at 05:34

## 2021-03-17 RX ADMIN — TRAMADOL HYDROCHLORIDE 50 MILLIGRAM(S): 50 TABLET ORAL at 04:26

## 2021-03-17 RX ADMIN — Medication 6 MILLIGRAM(S): at 22:10

## 2021-03-17 RX ADMIN — INSULIN GLARGINE 20 UNIT(S): 100 INJECTION, SOLUTION SUBCUTANEOUS at 22:10

## 2021-03-17 RX ADMIN — SODIUM CHLORIDE 1 GRAM(S): 9 INJECTION INTRAMUSCULAR; INTRAVENOUS; SUBCUTANEOUS at 17:42

## 2021-03-17 RX ADMIN — Medication 1 MILLIGRAM(S): at 12:17

## 2021-03-17 RX ADMIN — ENOXAPARIN SODIUM 40 MILLIGRAM(S): 100 INJECTION SUBCUTANEOUS at 17:42

## 2021-03-17 RX ADMIN — Medication 1 TABLET(S): at 12:17

## 2021-03-17 RX ADMIN — Medication 1 PATCH: at 06:46

## 2021-03-17 RX ADMIN — Medication 1 PATCH: at 12:00

## 2021-03-17 RX ADMIN — SODIUM CHLORIDE 1 GRAM(S): 9 INJECTION INTRAMUSCULAR; INTRAVENOUS; SUBCUTANEOUS at 05:43

## 2021-03-17 RX ADMIN — Medication 100 MILLIGRAM(S): at 12:17

## 2021-03-17 RX ADMIN — Medication 2: at 07:43

## 2021-03-17 RX ADMIN — Medication 1 TABLET(S): at 22:09

## 2021-03-17 RX ADMIN — METFORMIN HYDROCHLORIDE 1000 MILLIGRAM(S): 850 TABLET ORAL at 08:24

## 2021-03-17 NOTE — PROGRESS NOTE ADULT - SUBJECTIVE AND OBJECTIVE BOX
56 y o M with PMHx of HTN (non-compliant with medications) who presented to [facility] on 03/07/2021 with HA and imbalance s/p fall on 3/6. SBP elevated on admission >220,  CT head findings revealed right thalamic hemorrhage with intraventricular hemorrhage.   received j/j vaccine on 3/12/21    seen at the bedside, still c/o pain in the back and B/L LE, 8/10, states pain is aggravated by trying to  lift legs, relieved to 3/10 with rest, no n/v, no sob,   afebrile, no dysuria,      Vital Signs Last 24 Hrs  T(C): 37.4 (17 Mar 2021 08:52), Max: 37.4 (17 Mar 2021 08:52)  T(F): 99.3 (17 Mar 2021 08:52), Max: 99.3 (17 Mar 2021 08:52)  HR: 67 (17 Mar 2021 08:52) (67 - 83)  BP: 117/78 (17 Mar 2021 08:52) (105/69 - 159/81)  BP(mean): --  RR: 18 (17 Mar 2021 08:52) (18 - 18)  SpO2: 97% (17 Mar 2021 08:52) (97% - 100%)    GENERAL- NAD  EAR/NOSE/MOUTH/THROAT - no pharyngeal exudates, no oral lesions  MMM  EYES- MAURY, conjunctiva and Sclera clear  NECK- supple  RESPIRATORY-  clear to auscultation bilaterally  CARDIOVASCULAR - SIS2, RRR  GI - soft NT BS present  EXTREMITIES- no pedal edema  NEUROLOGY- no gross focal deficits, no facial asymmetry.   SKIN- no rashes, warm to touch  PSYCHIATRY- AAO X 3  MUSCULOSKELETAL- ROM normal                x                    127  | 30   | 20           x     >-----------< x       ------------------------< 161                   x                    3.9  | 91   | 0.76                                         Ca 9.0   Mg x     Ph x        CAPILLARY BLOOD GLUCOSE      POCT Blood Glucose.: 174 mg/dL (17 Mar 2021 07:41)  POCT Blood Glucose.: 148 mg/dL (16 Mar 2021 20:56)  POCT Blood Glucose.: 164 mg/dL (16 Mar 2021 16:51)  POCT Blood Glucose.: 172 mg/dL (16 Mar 2021 11:52)    MEDICATIONS  (STANDING):  atorvastatin 80 milliGRAM(s) Oral at bedtime  cloNIDine 0.3 milliGRAM(s) Oral three times a day  enoxaparin Injectable 40 milliGRAM(s) SubCutaneous <User Schedule>  folic acid 1 milliGRAM(s) Oral daily  gabapentin 100 milliGRAM(s) Oral at bedtime  glucagon  Injectable 1 milliGRAM(s) IntraMuscular once  influenza   Vaccine 0.5 milliLiter(s) IntraMuscular once  insulin glargine Injectable (LANTUS) 20 Unit(s) SubCutaneous at bedtime  insulin lispro (ADMELOG) corrective regimen sliding scale   SubCutaneous three times a day before meals  insulin lispro (ADMELOG) corrective regimen sliding scale   SubCutaneous at bedtime  losartan 100 milliGRAM(s) Oral daily  melatonin 6 milliGRAM(s) Oral at bedtime  metFORMIN 1000 milliGRAM(s) Oral two times a day with meals  multivitamin 1 Tablet(s) Oral daily  nicotine -  14 mG/24Hr(s) Patch 1 patch Transdermal daily  NIFEdipine XL 90 milliGRAM(s) Oral daily  polyethylene glycol 3350 17 Gram(s) Oral daily  senna 2 Tablet(s) Oral at bedtime  sodium chloride 1 Gram(s) Oral two times a day  thiamine 100 milliGRAM(s) Oral daily    MEDICATIONS  (PRN):  acetaminophen   Tablet .. 650 milliGRAM(s) Oral every 6 hours PRN Temp greater or equal to 38C (100.4F), Mild Pain (1 - 3)  aluminum hydroxide/magnesium hydroxide/simethicone Suspension 30 milliLiter(s) Oral every 4 hours PRN Dyspepsia  traMADol 25 milliGRAM(s) Oral every 6 hours PRN Moderate Pain (4 - 6)  traMADol 50 milliGRAM(s) Oral every 6 hours PRN Severe Pain (7 - 10)

## 2021-03-17 NOTE — PROVIDER CONTACT NOTE (OTHER) - SITUATION
pt had 2 episodes of loose stool today pt had 2 episodes of loose stool today Scan blood also noted while wiping pt after last BM

## 2021-03-17 NOTE — PROGRESS NOTE ADULT - SUBJECTIVE AND OBJECTIVE BOX
DAILY PROGRESS NOTE:  HPI:  PENNIE GIANG is a 56M with PMHx of HTN (non-compliant with medications) who presented to [facility] on 03/07/2021 with HA and imbalance s/p fall on 3/6. SBP elevated on admisison >220, thus started on nicardipine gtt; CT head findings c/w right thalamic hemorrhage with intraventricular hemorrhage. Cerebral angiography on 3/8 did not show evidence of AVM. Nicardipine weaned off and started on Clonidine, HCTZ, Nifedipine, and Losartan. Repeat CT head on 3/9 with stable findings. Plan for MRI brain in 4-6 weeks to rule out underlying vascular malformation.    Patient was evaluated by PM&R and therapy for functional deficits and gait/ ADL impairments and recommended acute rehabilitation. Patient was medically optimized for discharge to [facility] on 03/12/2021.    On admission, patient noted to have temp 99.6F oral, 101F rectal. Patient denies feeling febrile, chills, SOB, dysuria, abd pain. COVID-19 PCR, UA and CXR ordered.  (13 Mar 2021 12:23)      Subjective:  Patient was seen and examined at the bedside this AM; No acute overnight events.   LBP better over night, but felt sleepy with gabapentin.  No pain while resting, but concerned that pain will in increase during therapy and activity     ROS:  Denies radiating pain   denied numbness or tingling. Denied fever, chills, nausea, vomiting, CP, palpitations, coughing, wheezing, SOB, or abdominal pain.      Physical Exam:  Vital Signs Last 24 Hrs  T(C): 37.4 (17 Mar 2021 08:52), Max: 37.4 (17 Mar 2021 08:52)  T(F): 99.3 (17 Mar 2021 08:52), Max: 99.3 (17 Mar 2021 08:52)  HR: 67 (17 Mar 2021 08:52) (67 - 83)  BP: 117/78 (17 Mar 2021 08:52) (105/69 - 159/81)  BP(mean): --  RR: 18 (17 Mar 2021 08:52) (18 - 18)  SpO2: 97% (17 Mar 2021 08:52) (97% - 100%)          Gen - NAD, Comfortable  Pulm - CTAB, No wheeze, No rhonchi  Cardiovascular - S1S2  Abdomen - Soft, NT/ND, +BS  Extremities - no edema or calf tenderness  Neuro-     Cognitive - awake and alert     Communication - Fluent, No dysarthria     Cranial Nerves - no facial asymmetry     Motor -                     LEFT    UE - ShAB 5/5, EF 5/5, EE 4/5, WE 4/5,  5/5                    RIGHT UE - ShAB 5/5, EF 5/5, EE 5/5, WE 5/5,  5/5                    LEFT    LE - HF 4/5, KE 5/5, DF 5/5, PF 5/5                    RIGHT LE - HF 5/5, KE 5/5, DF 5/5, PF 5/5     Psychiatric - Mood stable, Affect WNL    MEDICATIONS  (STANDING):  atorvastatin 80 milliGRAM(s) Oral at bedtime  cloNIDine 0.3 milliGRAM(s) Oral three times a day  dextrose 40% Gel 15 Gram(s) Oral once  dextrose 5%. 1000 milliLiter(s) (50 mL/Hr) IV Continuous <Continuous>  dextrose 5%. 1000 milliLiter(s) (100 mL/Hr) IV Continuous <Continuous>  dextrose 50% Injectable 25 Gram(s) IV Push once  dextrose 50% Injectable 12.5 Gram(s) IV Push once  dextrose 50% Injectable 25 Gram(s) IV Push once  enoxaparin Injectable 40 milliGRAM(s) SubCutaneous <User Schedule>  folic acid 1 milliGRAM(s) Oral daily  gabapentin 100 milliGRAM(s) Oral at bedtime  glucagon  Injectable 1 milliGRAM(s) IntraMuscular once  influenza   Vaccine 0.5 milliLiter(s) IntraMuscular once  insulin glargine Injectable (LANTUS) 20 Unit(s) SubCutaneous at bedtime  insulin lispro (ADMELOG) corrective regimen sliding scale   SubCutaneous three times a day before meals  insulin lispro (ADMELOG) corrective regimen sliding scale   SubCutaneous at bedtime  losartan 100 milliGRAM(s) Oral daily  melatonin 6 milliGRAM(s) Oral at bedtime  metFORMIN 1000 milliGRAM(s) Oral two times a day with meals  multivitamin 1 Tablet(s) Oral daily  nicotine -  14 mG/24Hr(s) Patch 1 patch Transdermal daily  NIFEdipine XL 90 milliGRAM(s) Oral daily  polyethylene glycol 3350 17 Gram(s) Oral daily  senna 2 Tablet(s) Oral at bedtime  sodium chloride 1 Gram(s) Oral two times a day  thiamine 100 milliGRAM(s) Oral daily    MEDICATIONS  (PRN):  acetaminophen   Tablet .. 650 milliGRAM(s) Oral every 6 hours PRN Temp greater or equal to 38C (100.4F), Mild Pain (1 - 3)  aluminum hydroxide/magnesium hydroxide/simethicone Suspension 30 milliLiter(s) Oral every 4 hours PRN Dyspepsia  traMADol 25 milliGRAM(s) Oral every 6 hours PRN Moderate Pain (4 - 6)  traMADol 50 milliGRAM(s) Oral every 6 hours PRN Severe Pain (7 - 10)      CAPILLARY BLOOD GLUCOSE      POCT Blood Glucose.: 174 mg/dL (17 Mar 2021 07:41)  POCT Blood Glucose.: 148 mg/dL (16 Mar 2021 20:56)  POCT Blood Glucose.: 164 mg/dL (16 Mar 2021 16:51)  POCT Blood Glucose.: 172 mg/dL (16 Mar 2021 11:52)      Recent Labs/Imaging:             Osmolality, Random Urine (03.16.21 @ 13:44)    Osmolality, Random Urine: 861 mosm/Kg    Osmolality, Serum (03.16.21 @ 06:15)    Osmolality, Serum: 275 mosmol/kg    Sodium, Random Urine (03.16.21 @ 13:44)    Sodium, Random Urine: 26: Test Repeated  Reference Ranges have NOT been established for random urine analytes due  to variability in fluid intake and concentration. mmol/L                       15.4   9.74  )-----------( 234      ( 15 Mar 2021 06:55 )             44.7     03-16    127<L>  |  91<L>  |  20  ----------------------------<  161<H>  3.9   |  30  |  0.76    Ca    9.0      16 Mar 2021 06:15  Mg     2.1     03-15

## 2021-03-17 NOTE — PROGRESS NOTE ADULT - ASSESSMENT
56M with PMHx of HTN (non-compliant with medications) who presented to [facility] on 03/07/2021 with HA and imbalance s/p fall on 3/6, admission SBP >220, found to have right thalamic hemorrhage with intraventricular extension.  Admitted for multidisciplinary rehab program- pt/ot/dvt ppx    #Right thalamic hemorrhage with intraventricular extension  - likely 2/2 uncontrolled HTN, per chart patient non complaint with meds.  - s/p cerebral angiography-->no evidence of AVM; last CT on 3/9 was stable  - per neuro, MRI in 4-6 weeks to r/o vascular malformations  - BP control, as below    #DM2- uncontrolled  - iss, Accu-Cheks Lantus diabetic diet  - C/W  Lantus 20 u AND METFORMIN 1gm bid  - was not taking any  meds for DM at home  - A1C with Estimated Average Glucose Result: 8.7% (03.08.21 @ 05:08)    # hyponatremia likely SIADH, monitor - stop hctz, C/W salt tabs for now, fluid restriction,  reassess    # hypokalemia monitor, replete, check lytes in am , stop hctz    #HTN- placed on multiple antihypertensives, will monitor and adjust  as needed  - c/w Clonidine, Nifedipine, and Losartan   - stop hctz due to hyponatremia and hypokalemia  - mag normal 3/15/21    #HLD  - c/w Atorvastatin    #LBP  - CT lumbar spine negative for acute pathology    #Smoking history  - c/w Nicotine patch daily  - advised cessation    #insomnia- Melatonin PRN    #DVT prophylaxis- Lovenox     will follow  d/w dr. ness

## 2021-03-17 NOTE — PROGRESS NOTE ADULT - ASSESSMENT
PENNIE GIANG is a 56M with PMHx of HTN (non-compliant with medications) who presented to [facility] on 03/07/2021 with HA and imbalance s/p fall on 3/6, admission SBP >220, found to have right thalamic hemorrhage with intraventricular extension. Admitted for multidisciplinary rehab program.      #Comprehensive Multidisciplinary Rehab Program:  - Gait, ADL, Functional impairments  - PT/OT/ SLP 3 hours a day 5 days a week    #Right thalamic hemorrhage with intraventricular extension:- likely 2/2 uncontrolled HTN  - s/p cerebral angiography-->no evidence of AVM; last CT on 3/9 was stable  - per neuro, MRI in 4-6 weeks to r/o vascular malformations  - BP control, as below    #LBP:- symptoms radicular in nature; denies numbness or paresthesias  - Continue  Gabapentin 100mg qhs    #Hyponatremia  - overall downtrending Na; DC HCTZ  Urine studies suggestive of SIADH- fluid restriction started   Labs in am     HTN  - c/w Clonidine 0.3mg TID, HCTZ 25mg daily, Nifedipine 90mg daily, and Losartan 100mg daily    Fever:-at admission, - s/p J&J vaccine prior to admission; ID w/u neg    HLD:- c/w Atorvastatin 80mg qhs    Smoking history  - c/w Nicotine patch daily    #Sleep:- Melatonin PRN    #Pain:  - Tylenol 650mg q6h PRN mild pain, Tramadol 25mg q6h PRN moderate pain, and Tramadol 50mg q6h PRN severe pain  - warm compress to low back PRN  - avoid sedating meds that may affect cognitive recovery    #GI/Bowel:- Senna 2 tabs qhs and Miralax daily    #/Bladder:- Toileting schedule q4h    #Diet :- Diet: DASH/TLC and carb consistent  - Nutrition to follow    #Skin/ Pressure Injury Prevention: Turn Q2hrs in bed while awake, OOB to Chair, PT/OT    #DVT prophylaxis:  - Lovenox 40mg daily  - SCDs    #Precautions/ Restrictions  - Falls  - COVID PCR:     #Dispo: IDR 03/16/2021  - OT: setup for eating and grooming; Cat with UE/LE dressing; Cat toilet transfer and bathing  - PT: Cat for transfers; ambulates 40' with RW Cat  - SLP: continuing for cognitive/executive function  - Barriers: left inattention  - TDD: 3/27 to home

## 2021-03-18 LAB
ANION GAP SERPL CALC-SCNC: 10 MMOL/L — SIGNIFICANT CHANGE UP (ref 5–17)
BUN SERPL-MCNC: 16 MG/DL — SIGNIFICANT CHANGE UP (ref 7–23)
CALCIUM SERPL-MCNC: 9.3 MG/DL — SIGNIFICANT CHANGE UP (ref 8.4–10.5)
CHLORIDE SERPL-SCNC: 93 MMOL/L — LOW (ref 96–108)
CO2 SERPL-SCNC: 24 MMOL/L — SIGNIFICANT CHANGE UP (ref 22–31)
CREAT SERPL-MCNC: 0.7 MG/DL — SIGNIFICANT CHANGE UP (ref 0.5–1.3)
GLUCOSE BLDC GLUCOMTR-MCNC: 131 MG/DL — HIGH (ref 70–99)
GLUCOSE BLDC GLUCOMTR-MCNC: 132 MG/DL — HIGH (ref 70–99)
GLUCOSE BLDC GLUCOMTR-MCNC: 157 MG/DL — HIGH (ref 70–99)
GLUCOSE BLDC GLUCOMTR-MCNC: 174 MG/DL — HIGH (ref 70–99)
GLUCOSE SERPL-MCNC: 133 MG/DL — HIGH (ref 70–99)
HCT VFR BLD CALC: 43.4 % — SIGNIFICANT CHANGE UP (ref 39–50)
HGB BLD-MCNC: 15.3 G/DL — SIGNIFICANT CHANGE UP (ref 13–17)
MCHC RBC-ENTMCNC: 31.1 PG — SIGNIFICANT CHANGE UP (ref 27–34)
MCHC RBC-ENTMCNC: 35.3 GM/DL — SIGNIFICANT CHANGE UP (ref 32–36)
MCV RBC AUTO: 88.2 FL — SIGNIFICANT CHANGE UP (ref 80–100)
NRBC # BLD: 0 /100 WBCS — SIGNIFICANT CHANGE UP (ref 0–0)
PLATELET # BLD AUTO: 262 K/UL — SIGNIFICANT CHANGE UP (ref 150–400)
POTASSIUM SERPL-MCNC: 4 MMOL/L — SIGNIFICANT CHANGE UP (ref 3.5–5.3)
POTASSIUM SERPL-SCNC: 4 MMOL/L — SIGNIFICANT CHANGE UP (ref 3.5–5.3)
RBC # BLD: 4.92 M/UL — SIGNIFICANT CHANGE UP (ref 4.2–5.8)
RBC # FLD: 11.8 % — SIGNIFICANT CHANGE UP (ref 10.3–14.5)
SODIUM SERPL-SCNC: 127 MMOL/L — LOW (ref 135–145)
WBC # BLD: 8.12 K/UL — SIGNIFICANT CHANGE UP (ref 3.8–10.5)
WBC # FLD AUTO: 8.12 K/UL — SIGNIFICANT CHANGE UP (ref 3.8–10.5)

## 2021-03-18 PROCEDURE — 99232 SBSQ HOSP IP/OBS MODERATE 35: CPT | Mod: GC

## 2021-03-18 PROCEDURE — 99233 SBSQ HOSP IP/OBS HIGH 50: CPT

## 2021-03-18 RX ORDER — ACETAMINOPHEN 500 MG
975 TABLET ORAL EVERY 8 HOURS
Refills: 0 | Status: COMPLETED | OUTPATIENT
Start: 2021-03-18 | End: 2021-03-21

## 2021-03-18 RX ORDER — TRAMADOL HYDROCHLORIDE 50 MG/1
50 TABLET ORAL EVERY 6 HOURS
Refills: 0 | Status: DISCONTINUED | OUTPATIENT
Start: 2021-03-18 | End: 2021-03-21

## 2021-03-18 RX ORDER — TRAMADOL HYDROCHLORIDE 50 MG/1
25 TABLET ORAL EVERY 6 HOURS
Refills: 0 | Status: DISCONTINUED | OUTPATIENT
Start: 2021-03-18 | End: 2021-03-18

## 2021-03-18 RX ORDER — GABAPENTIN 400 MG/1
300 CAPSULE ORAL AT BEDTIME
Refills: 0 | Status: DISCONTINUED | OUTPATIENT
Start: 2021-03-18 | End: 2021-03-31

## 2021-03-18 RX ORDER — METFORMIN HYDROCHLORIDE 850 MG/1
500 TABLET ORAL
Refills: 0 | Status: DISCONTINUED | OUTPATIENT
Start: 2021-03-19 | End: 2021-03-21

## 2021-03-18 RX ADMIN — ATORVASTATIN CALCIUM 80 MILLIGRAM(S): 80 TABLET, FILM COATED ORAL at 21:15

## 2021-03-18 RX ADMIN — Medication 1 PATCH: at 11:50

## 2021-03-18 RX ADMIN — INSULIN GLARGINE 20 UNIT(S): 100 INJECTION, SOLUTION SUBCUTANEOUS at 21:16

## 2021-03-18 RX ADMIN — Medication 90 MILLIGRAM(S): at 05:31

## 2021-03-18 RX ADMIN — Medication 975 MILLIGRAM(S): at 15:00

## 2021-03-18 RX ADMIN — GABAPENTIN 300 MILLIGRAM(S): 400 CAPSULE ORAL at 21:15

## 2021-03-18 RX ADMIN — Medication 6 MILLIGRAM(S): at 21:16

## 2021-03-18 RX ADMIN — ENOXAPARIN SODIUM 40 MILLIGRAM(S): 100 INJECTION SUBCUTANEOUS at 17:00

## 2021-03-18 RX ADMIN — Medication 100 MILLIGRAM(S): at 11:51

## 2021-03-18 RX ADMIN — Medication 975 MILLIGRAM(S): at 21:15

## 2021-03-18 RX ADMIN — Medication 1 PATCH: at 12:00

## 2021-03-18 RX ADMIN — Medication 1 TABLET(S): at 11:51

## 2021-03-18 RX ADMIN — SODIUM CHLORIDE 1 GRAM(S): 9 INJECTION INTRAMUSCULAR; INTRAVENOUS; SUBCUTANEOUS at 05:31

## 2021-03-18 RX ADMIN — Medication 2: at 07:40

## 2021-03-18 RX ADMIN — LOSARTAN POTASSIUM 100 MILLIGRAM(S): 100 TABLET, FILM COATED ORAL at 05:31

## 2021-03-18 RX ADMIN — Medication 0.3 MILLIGRAM(S): at 21:15

## 2021-03-18 RX ADMIN — Medication 975 MILLIGRAM(S): at 14:21

## 2021-03-18 RX ADMIN — TRAMADOL HYDROCHLORIDE 50 MILLIGRAM(S): 50 TABLET ORAL at 10:18

## 2021-03-18 RX ADMIN — Medication 1 PATCH: at 06:54

## 2021-03-18 RX ADMIN — TRAMADOL HYDROCHLORIDE 50 MILLIGRAM(S): 50 TABLET ORAL at 10:45

## 2021-03-18 RX ADMIN — Medication 1 MILLIGRAM(S): at 11:51

## 2021-03-18 RX ADMIN — SODIUM CHLORIDE 1 GRAM(S): 9 INJECTION INTRAMUSCULAR; INTRAVENOUS; SUBCUTANEOUS at 17:00

## 2021-03-18 RX ADMIN — Medication 0.3 MILLIGRAM(S): at 05:31

## 2021-03-18 RX ADMIN — Medication 975 MILLIGRAM(S): at 22:08

## 2021-03-18 RX ADMIN — Medication 1 PATCH: at 20:30

## 2021-03-18 RX ADMIN — METFORMIN HYDROCHLORIDE 1000 MILLIGRAM(S): 850 TABLET ORAL at 07:40

## 2021-03-18 RX ADMIN — Medication 0.3 MILLIGRAM(S): at 14:23

## 2021-03-18 NOTE — PROGRESS NOTE ADULT - SUBJECTIVE AND OBJECTIVE BOX
DAILY PROGRESS NOTE:  HPI:  PENNIE GIANG is a 56M with PMHx of HTN (non-compliant with medications) who presented to [facility] on 03/07/2021 with HA and imbalance s/p fall on 3/6. SBP elevated on admisison >220, thus started on nicardipine gtt; CT head findings c/w right thalamic hemorrhage with intraventricular hemorrhage. Cerebral angiography on 3/8 did not show evidence of AVM. Nicardipine weaned off and started on Clonidine, HCTZ, Nifedipine, and Losartan. Repeat CT head on 3/9 with stable findings. Plan for MRI brain in 4-6 weeks to rule out underlying vascular malformation.    Patient was evaluated by PM&R and therapy for functional deficits and gait/ ADL impairments and recommended acute rehabilitation. Patient was medically optimized for discharge to [facility] on 03/12/2021.    On admission, patient noted to have temp 99.6F oral, 101F rectal. Patient denies feeling febrile, chills, SOB, dysuria, abd pain. COVID-19 PCR, UA and CXR ordered.  (13 Mar 2021 12:23)      Subjective:  Patient was seen and examined at the bedside this AM. No acute overnight events. C/o LBP radiating to back of right leg with OT this morning. Pain during encounter was 2/10.     ROS:  (+) LBP radiating down posterior aspect of right LE. Denied fever, chills, nausea, vomiting, CP, palpitations, coughing, wheezing, SOB, or abdominal pain.      Physical Exam:  Vital Signs Last 24 Hrs  T(C): 36.3 (18 Mar 2021 08:28), Max: 37.1 (17 Mar 2021 13:08)  T(F): 97.4 (18 Mar 2021 08:28), Max: 98.7 (17 Mar 2021 13:08)  HR: 68 (18 Mar 2021 08:28) (64 - 98)  BP: 144/90 (18 Mar 2021 08:28) (118/77 - 144/90)  RR: 16 (18 Mar 2021 08:28) (16 - 16)  SpO2: 99% (18 Mar 2021 08:28) (98% - 100%)      Gen - NAD, Comfortable  Pulm - CTAB, No wheeze, No rhonchi  Cardiovascular - S1S2  Abdomen - Soft, NT/ND, +BS  Extremities - no edema or calf tenderness  Neuro-     Cognitive - awake and alert     Communication - Fluent, No dysarthria     Cranial Nerves - no facial asymmetry     Motor -                     LEFT    UE - ShAB 5/5, EF 5/5, EE 4/5, WE 4/5,  5/5                    RIGHT UE - ShAB 5/5, EF 5/5, EE 5/5, WE 5/5,  5/5                    LEFT    LE - HF 4/5, KE 5/5, DF 5/5, PF 5/5                    RIGHT LE - HF 5/5, KE 5/5, DF 5/5, PF 5/5     Psychiatric - Mood stable, Affect WNL      Recent Labs/Imaging:                        15.3   8.12  )-----------( 262      ( 18 Mar 2021 05:30 )             43.4     03-18    127<L>  |  93<L>  |  16  ----------------------------<  133<H>  4.0   |  24  |  0.70    Ca    9.3      18 Mar 2021 05:30    POCT Blood Glucose.: 157 mg/dL (03-18-21 @ 07:37)  POCT Blood Glucose.: 146 mg/dL (03-17-21 @ 22:07)  POCT Blood Glucose.: 147 mg/dL (03-17-21 @ 17:10)  POCT Blood Glucose.: 185 mg/dL (03-17-21 @ 12:10)      Medications:  acetaminophen   Tablet .. 650 milliGRAM(s) Oral every 6 hours PRN  aluminum hydroxide/magnesium hydroxide/simethicone Suspension 30 milliLiter(s) Oral every 4 hours PRN  atorvastatin 80 milliGRAM(s) Oral at bedtime  cloNIDine 0.3 milliGRAM(s) Oral three times a day  dextrose 40% Gel 15 Gram(s) Oral once  dextrose 5%. 1000 milliLiter(s) IV Continuous <Continuous>  dextrose 5%. 1000 milliLiter(s) IV Continuous <Continuous>  dextrose 50% Injectable 25 Gram(s) IV Push once  dextrose 50% Injectable 12.5 Gram(s) IV Push once  dextrose 50% Injectable 25 Gram(s) IV Push once  enoxaparin Injectable 40 milliGRAM(s) SubCutaneous <User Schedule>  folic acid 1 milliGRAM(s) Oral daily  gabapentin 100 milliGRAM(s) Oral at bedtime  glucagon  Injectable 1 milliGRAM(s) IntraMuscular once  influenza   Vaccine 0.5 milliLiter(s) IntraMuscular once  insulin glargine Injectable (LANTUS) 20 Unit(s) SubCutaneous at bedtime  insulin lispro (ADMELOG) corrective regimen sliding scale   SubCutaneous three times a day before meals  insulin lispro (ADMELOG) corrective regimen sliding scale   SubCutaneous at bedtime  lactobacillus acidophilus 1 Tablet(s) Oral daily  losartan 100 milliGRAM(s) Oral daily  melatonin 6 milliGRAM(s) Oral at bedtime  metFORMIN 1000 milliGRAM(s) Oral two times a day with meals  multivitamin 1 Tablet(s) Oral daily  nicotine -  14 mG/24Hr(s) Patch 1 patch Transdermal daily  NIFEdipine XL 90 milliGRAM(s) Oral daily  sodium chloride 1 Gram(s) Oral two times a day  thiamine 100 milliGRAM(s) Oral daily  traMADol 25 milliGRAM(s) Oral every 6 hours PRN  traMADol 50 milliGRAM(s) Oral every 6 hours PRN

## 2021-03-18 NOTE — PROGRESS NOTE ADULT - SUBJECTIVE AND OBJECTIVE BOX
56 y o M with PMHx of HTN (non-compliant with medications) who presented to [facility] on 03/07/2021 with HA and imbalance s/p fall on 3/6. SBP elevated on admission >220,  CT head findings revealed right thalamic hemorrhage with intraventricular hemorrhage.   received j/j vaccine on 3/12/21    seen at the bedside, still c/o pain in the back and B/L LE, 8/10, states pain is aggravated by trying to lift legs, relieved to 3/10 with rest, no n/v, no sob,   afebrile, no dysuria      Vital Signs Last 24 Hrs  T(C): 36.3 (18 Mar 2021 08:28), Max: 37.1 (17 Mar 2021 13:08)  T(F): 97.4 (18 Mar 2021 08:28), Max: 98.7 (17 Mar 2021 13:08)  HR: 68 (18 Mar 2021 08:28) (64 - 98)  BP: 144/90 (18 Mar 2021 08:28) (118/77 - 144/90)  BP(mean): --  RR: 16 (18 Mar 2021 08:28) (16 - 16)  SpO2: 99% (18 Mar 2021 08:28) (98% - 100%)      GENERAL- NAD  EAR/NOSE/MOUTH/THROAT - no pharyngeal exudates, no oral lesions  MMM  EYES- MAURY, conjunctiva and Sclera clear  NECK- supple  RESPIRATORY-  clear to auscultation bilaterally  CARDIOVASCULAR - SIS2, RRR  GI - soft NT BS present  EXTREMITIES- no pedal edema  NEUROLOGY- no gross focal deficits, no facial asymmetry.   SKIN- no rashes, warm to touch  PSYCHIATRY- AAO X 3  MUSCULOSKELETAL- ROM normal                  15.3                 127  | 24   | 16           8.12  >-----------< 262     ------------------------< 133                   43.4                 4.0  | 93   | 0.70                                         Ca 9.3   Mg x     Ph x          CAPILLARY BLOOD GLUCOSE      POCT Blood Glucose.: 157 mg/dL (18 Mar 2021 07:37)  POCT Blood Glucose.: 146 mg/dL (17 Mar 2021 22:07)  POCT Blood Glucose.: 147 mg/dL (17 Mar 2021 17:10)  POCT Blood Glucose.: 185 mg/dL (17 Mar 2021 12:10)      MEDICATIONS  (STANDING):  atorvastatin 80 milliGRAM(s) Oral at bedtime  cloNIDine 0.3 milliGRAM(s) Oral three times a day  enoxaparin Injectable 40 milliGRAM(s) SubCutaneous <User Schedule>  folic acid 1 milliGRAM(s) Oral daily  gabapentin 100 milliGRAM(s) Oral at bedtime  glucagon  Injectable 1 milliGRAM(s) IntraMuscular once  influenza   Vaccine 0.5 milliLiter(s) IntraMuscular once  insulin glargine Injectable (LANTUS) 20 Unit(s) SubCutaneous at bedtime  insulin lispro (ADMELOG) corrective regimen sliding scale   SubCutaneous three times a day before meals  insulin lispro (ADMELOG) corrective regimen sliding scale   SubCutaneous at bedtime  lactobacillus acidophilus 1 Tablet(s) Oral daily  losartan 100 milliGRAM(s) Oral daily  melatonin 6 milliGRAM(s) Oral at bedtime  metFORMIN 1000 milliGRAM(s) Oral two times a day with meals  multivitamin 1 Tablet(s) Oral daily  nicotine -  14 mG/24Hr(s) Patch 1 patch Transdermal daily  NIFEdipine XL 90 milliGRAM(s) Oral daily  sodium chloride 1 Gram(s) Oral two times a day  thiamine 100 milliGRAM(s) Oral daily    MEDICATIONS  (PRN):  acetaminophen   Tablet .. 650 milliGRAM(s) Oral every 6 hours PRN Temp greater or equal to 38C (100.4F), Mild Pain (1 - 3)  aluminum hydroxide/magnesium hydroxide/simethicone Suspension 30 milliLiter(s) Oral every 4 hours PRN Dyspepsia  traMADol 25 milliGRAM(s) Oral every 6 hours PRN Moderate Pain (4 - 6)  traMADol 50 milliGRAM(s) Oral every 6 hours PRN Severe Pain (7 - 10)   56 y o M with PMHx of HTN (non-compliant with medications) who presented to [facility] on 03/07/2021 with HA and imbalance s/p fall on 3/6. SBP elevated on admission >220,  CT head findings revealed right thalamic hemorrhage with intraventricular hemorrhage.   received j/j vaccine on 3/12/21    seen at the bedside, still c/o intermittent pain in the back and right LE 10/10,  relieved to 3/10 with rest, no n/v, no sob,   afebrile, no dysuria      Vital Signs Last 24 Hrs  T(C): 36.3 (18 Mar 2021 08:28), Max: 37.1 (17 Mar 2021 13:08)  T(F): 97.4 (18 Mar 2021 08:28), Max: 98.7 (17 Mar 2021 13:08)  HR: 68 (18 Mar 2021 08:28) (64 - 98)  BP: 144/90 (18 Mar 2021 08:28) (118/77 - 144/90)  BP(mean): --  RR: 16 (18 Mar 2021 08:28) (16 - 16)  SpO2: 99% (18 Mar 2021 08:28) (98% - 100%)      GENERAL- NAD  EAR/NOSE/MOUTH/THROAT - no pharyngeal exudates, no oral lesions  MMM  EYES- MAURY, conjunctiva and Sclera clear  NECK- supple  RESPIRATORY-  clear to auscultation bilaterally  CARDIOVASCULAR - SIS2, RRR  GI - soft NT BS present  EXTREMITIES- no pedal edema  NEUROLOGY- no gross focal deficits, no facial asymmetry.   SKIN- no rashes, warm to touch  PSYCHIATRY- AAO X 3  MUSCULOSKELETAL- ROM normal                  15.3                 127  | 24   | 16           8.12  >-----------< 262     ------------------------< 133                   43.4                 4.0  | 93   | 0.70                                         Ca 9.3   Mg x     Ph x          CAPILLARY BLOOD GLUCOSE      POCT Blood Glucose.: 157 mg/dL (18 Mar 2021 07:37)  POCT Blood Glucose.: 146 mg/dL (17 Mar 2021 22:07)  POCT Blood Glucose.: 147 mg/dL (17 Mar 2021 17:10)  POCT Blood Glucose.: 185 mg/dL (17 Mar 2021 12:10)      MEDICATIONS  (STANDING):  atorvastatin 80 milliGRAM(s) Oral at bedtime  cloNIDine 0.3 milliGRAM(s) Oral three times a day  enoxaparin Injectable 40 milliGRAM(s) SubCutaneous <User Schedule>  folic acid 1 milliGRAM(s) Oral daily  gabapentin 100 milliGRAM(s) Oral at bedtime  glucagon  Injectable 1 milliGRAM(s) IntraMuscular once  influenza   Vaccine 0.5 milliLiter(s) IntraMuscular once  insulin glargine Injectable (LANTUS) 20 Unit(s) SubCutaneous at bedtime  insulin lispro (ADMELOG) corrective regimen sliding scale   SubCutaneous three times a day before meals  insulin lispro (ADMELOG) corrective regimen sliding scale   SubCutaneous at bedtime  lactobacillus acidophilus 1 Tablet(s) Oral daily  losartan 100 milliGRAM(s) Oral daily  melatonin 6 milliGRAM(s) Oral at bedtime  metFORMIN 1000 milliGRAM(s) Oral two times a day with meals  multivitamin 1 Tablet(s) Oral daily  nicotine -  14 mG/24Hr(s) Patch 1 patch Transdermal daily  NIFEdipine XL 90 milliGRAM(s) Oral daily  sodium chloride 1 Gram(s) Oral two times a day  thiamine 100 milliGRAM(s) Oral daily    MEDICATIONS  (PRN):  acetaminophen   Tablet .. 650 milliGRAM(s) Oral every 6 hours PRN Temp greater or equal to 38C (100.4F), Mild Pain (1 - 3)  aluminum hydroxide/magnesium hydroxide/simethicone Suspension 30 milliLiter(s) Oral every 4 hours PRN Dyspepsia  traMADol 25 milliGRAM(s) Oral every 6 hours PRN Moderate Pain (4 - 6)  traMADol 50 milliGRAM(s) Oral every 6 hours PRN Severe Pain (7 - 10)

## 2021-03-18 NOTE — PROGRESS NOTE ADULT - ASSESSMENT
PENNIE GIANG is a 56M with PMHx of HTN (non-compliant with medications) who presented to [facility] on 03/07/2021 with HA and imbalance s/p fall on 3/6, admission SBP >220, found to have right thalamic hemorrhage with intraventricular extension. Admitted for multidisciplinary rehab program.      #Comprehensive Multidisciplinary Rehab Program:  - Gait, ADL, Functional impairments  - PT/OT/ SLP 3 hours a day 5 days a week    #Right thalamic hemorrhage with intraventricular extension:- likely 2/2 uncontrolled HTN  - s/p cerebral angiography-->no evidence of AVM; last CT on 3/9 was stable  - per neuro, MRI in 4-6 weeks to r/o vascular malformations  - BP control, as below    #LBP:- symptoms radicular in nature; denies numbness or paresthesias  - increase Gabapentin to 300mg qhs  - Tylenol 975mg q8h standing for 3 days    #Hyponatremia  - overall downtrending Na; DC HCTZ  Urine studies suggestive of SIADH- fluid restriction started, c/w salt tabs  Labs in am     HTN  - c/w Clonidine 0.3mg TID, HCTZ 25mg daily, Nifedipine 90mg daily, and Losartan 100mg daily    Fever:-at admission, - s/p J&J vaccine prior to admission; ID w/u neg    HLD:- c/w Atorvastatin 80mg qhs    Smoking history  - c/w Nicotine patch daily    #Sleep:- Melatonin PRN    #Pain:  - Tylenol 650mg q6h PRN mild pain, Tramadol 25mg q6h PRN moderate pain, and Tramadol 50mg q6h PRN severe pain  - warm compress to low back PRN  - avoid sedating meds that may affect cognitive recovery    #GI/Bowel:- Senna 2 tabs qhs and Miralax daily    #/Bladder:- Toileting schedule q4h    #Diet :- Diet: DASH/TLC and carb consistent  - Nutrition to follow    #Skin/ Pressure Injury Prevention: Turn Q2hrs in bed while awake, OOB to Chair, PT/OT    #DVT prophylaxis:  - Lovenox 40mg daily  - SCDs    #Precautions/ Restrictions  - Falls  - COVID PCR:     #Dispo: IDR 03/16/2021  - OT: setup for eating and grooming; Cat with UE/LE dressing; Cat toilet transfer and bathing  - PT: Cat for transfers; ambulates 40' with RW Cat  - SLP: continuing for cognitive/executive function  - Barriers: left inattention  - TDD: 3/27 to home

## 2021-03-18 NOTE — PROGRESS NOTE ADULT - ATTENDING COMMENTS
Chart reviewed. Patient seen at bedside  During OT session, patient with increased complaints of back pain radiating down both legs. Will trial Tylenol 975 mg TID for pain control. If pain not controlled, then trial medrol dose lida.  Increase gabapentin 300mg qhs.     2. Nursing staff reports watery Bms - ? due to metformin- will dc for today and restart 500mg bid in am     3. Labs with persistent hyponatremia- Patient on fluid on salt tablets- Patient denies any new fatigue. FU BMP in am.     4.Hospitalist fu noted

## 2021-03-18 NOTE — PROGRESS NOTE ADULT - ASSESSMENT
56M with PMHx of HTN (non-compliant with medications) who presented to [facility] on 03/07/2021 with HA and imbalance s/p fall on 3/6, admission SBP >220, found to have right thalamic hemorrhage with intraventricular extension.  Admitted for multidisciplinary rehab program- pt/ot/dvt ppx    #Right thalamic hemorrhage with intraventricular extension  - likely 2/2 uncontrolled HTN, per chart patient non complaint with meds.  - s/p cerebral angiography-->no evidence of AVM; last CT on 3/9 was stable  - per neuro, MRI in 4-6 weeks to r/o vascular malformations  - BP control, as below    #DM2- uncontrolled  - iss, Accu-Cheks Lantus diabetic diet  - C/W  Lantus 20 u AND METFORMIN 1gm bid  - was not taking any  meds for DM at home  - A1C with Estimated Average Glucose Result: 8.7% (03.08.21 @ 05:08)    # hyponatremia likely SIADH, monitor - stop hctz, C/W salt tabs for now, fluid restriction,  reassess    # hypokalemia monitor, replete, check lytes in am , stop hctz    #HTN- placed on multiple antihypertensives, will monitor and adjust  as needed  - c/w Clonidine, Nifedipine, and Losartan   - stop hctz due to hyponatremia and hypokalemia  - mag normal 3/15/21    #HLD  - c/w Atorvastatin    #LBP  - CT lumbar spine negative for acute pathology    #Smoking history  - c/w Nicotine patch daily  - advised cessation    #insomnia- Melatonin PRN    #DVT prophylaxis- Lovenox     will follow  d/w dr. ness

## 2021-03-19 LAB
CULTURE RESULTS: SIGNIFICANT CHANGE UP
CULTURE RESULTS: SIGNIFICANT CHANGE UP
GLUCOSE BLDC GLUCOMTR-MCNC: 109 MG/DL — HIGH (ref 70–99)
GLUCOSE BLDC GLUCOMTR-MCNC: 130 MG/DL — HIGH (ref 70–99)
GLUCOSE BLDC GLUCOMTR-MCNC: 139 MG/DL — HIGH (ref 70–99)
GLUCOSE BLDC GLUCOMTR-MCNC: 169 MG/DL — HIGH (ref 70–99)
OSMOLALITY SERPL: 280 MOSMOL/KG — SIGNIFICANT CHANGE UP (ref 275–300)
SARS-COV-2 RNA SPEC QL NAA+PROBE: SIGNIFICANT CHANGE UP
SPECIMEN SOURCE: SIGNIFICANT CHANGE UP
SPECIMEN SOURCE: SIGNIFICANT CHANGE UP

## 2021-03-19 PROCEDURE — 99232 SBSQ HOSP IP/OBS MODERATE 35: CPT | Mod: GC

## 2021-03-19 PROCEDURE — 99233 SBSQ HOSP IP/OBS HIGH 50: CPT

## 2021-03-19 RX ORDER — LIDOCAINE 4 G/100G
1 CREAM TOPICAL
Refills: 0 | Status: DISCONTINUED | OUTPATIENT
Start: 2021-03-19 | End: 2021-03-19

## 2021-03-19 RX ORDER — LIDOCAINE 4 G/100G
1 CREAM TOPICAL
Refills: 0 | Status: DISCONTINUED | OUTPATIENT
Start: 2021-03-19 | End: 2021-03-31

## 2021-03-19 RX ADMIN — Medication 1 PATCH: at 19:33

## 2021-03-19 RX ADMIN — Medication 6 MILLIGRAM(S): at 21:42

## 2021-03-19 RX ADMIN — Medication 975 MILLIGRAM(S): at 23:00

## 2021-03-19 RX ADMIN — LIDOCAINE 1 PATCH: 4 CREAM TOPICAL at 09:13

## 2021-03-19 RX ADMIN — LOSARTAN POTASSIUM 100 MILLIGRAM(S): 100 TABLET, FILM COATED ORAL at 05:57

## 2021-03-19 RX ADMIN — Medication 975 MILLIGRAM(S): at 14:10

## 2021-03-19 RX ADMIN — Medication 975 MILLIGRAM(S): at 21:42

## 2021-03-19 RX ADMIN — LIDOCAINE 1 PATCH: 4 CREAM TOPICAL at 19:32

## 2021-03-19 RX ADMIN — Medication 1 TABLET(S): at 11:55

## 2021-03-19 RX ADMIN — LIDOCAINE 1 PATCH: 4 CREAM TOPICAL at 21:46

## 2021-03-19 RX ADMIN — Medication 1 PATCH: at 06:25

## 2021-03-19 RX ADMIN — ATORVASTATIN CALCIUM 80 MILLIGRAM(S): 80 TABLET, FILM COATED ORAL at 21:42

## 2021-03-19 RX ADMIN — INSULIN GLARGINE 20 UNIT(S): 100 INJECTION, SOLUTION SUBCUTANEOUS at 21:43

## 2021-03-19 RX ADMIN — GABAPENTIN 300 MILLIGRAM(S): 400 CAPSULE ORAL at 21:43

## 2021-03-19 RX ADMIN — METFORMIN HYDROCHLORIDE 500 MILLIGRAM(S): 850 TABLET ORAL at 07:58

## 2021-03-19 RX ADMIN — ENOXAPARIN SODIUM 40 MILLIGRAM(S): 100 INJECTION SUBCUTANEOUS at 17:32

## 2021-03-19 RX ADMIN — TRAMADOL HYDROCHLORIDE 50 MILLIGRAM(S): 50 TABLET ORAL at 21:43

## 2021-03-19 RX ADMIN — Medication 0.3 MILLIGRAM(S): at 21:42

## 2021-03-19 RX ADMIN — TRAMADOL HYDROCHLORIDE 50 MILLIGRAM(S): 50 TABLET ORAL at 10:30

## 2021-03-19 RX ADMIN — Medication 975 MILLIGRAM(S): at 05:57

## 2021-03-19 RX ADMIN — Medication 100 MILLIGRAM(S): at 11:56

## 2021-03-19 RX ADMIN — Medication 90 MILLIGRAM(S): at 05:57

## 2021-03-19 RX ADMIN — SODIUM CHLORIDE 1 GRAM(S): 9 INJECTION INTRAMUSCULAR; INTRAVENOUS; SUBCUTANEOUS at 17:32

## 2021-03-19 RX ADMIN — TRAMADOL HYDROCHLORIDE 50 MILLIGRAM(S): 50 TABLET ORAL at 09:37

## 2021-03-19 RX ADMIN — LIDOCAINE 1 APPLICATION(S): 4 CREAM TOPICAL at 22:10

## 2021-03-19 RX ADMIN — Medication 1 TABLET(S): at 11:56

## 2021-03-19 RX ADMIN — Medication 1 PATCH: at 11:30

## 2021-03-19 RX ADMIN — Medication 975 MILLIGRAM(S): at 13:20

## 2021-03-19 RX ADMIN — Medication 1 PATCH: at 11:55

## 2021-03-19 RX ADMIN — Medication 2: at 07:55

## 2021-03-19 RX ADMIN — Medication 1 MILLIGRAM(S): at 11:56

## 2021-03-19 RX ADMIN — METFORMIN HYDROCHLORIDE 500 MILLIGRAM(S): 850 TABLET ORAL at 17:32

## 2021-03-19 RX ADMIN — TRAMADOL HYDROCHLORIDE 50 MILLIGRAM(S): 50 TABLET ORAL at 23:00

## 2021-03-19 RX ADMIN — Medication 975 MILLIGRAM(S): at 06:59

## 2021-03-19 RX ADMIN — Medication 0.3 MILLIGRAM(S): at 05:57

## 2021-03-19 RX ADMIN — SODIUM CHLORIDE 1 GRAM(S): 9 INJECTION INTRAMUSCULAR; INTRAVENOUS; SUBCUTANEOUS at 05:57

## 2021-03-19 NOTE — PROGRESS NOTE ADULT - ASSESSMENT
56M with PMHx of HTN (non-compliant with medications) who presented to [facility] on 03/07/2021 with HA and imbalance s/p fall on 3/6, admission SBP >220, found to have right thalamic hemorrhage with intraventricular extension.  Admitted for multidisciplinary rehab program- pt/ot/dvt ppx    #Right thalamic hemorrhage with intraventricular extension  - suspect secondary to uncontrolled HTN, per chart patient non complaint with meds  - s/p cerebral angiography-->no evidence of AVM; last CT on 3/9 was stable  - per neuro, MRI in 4-6 weeks to r/o vascular malformations  - BP control, as below    #DM2- uncontrolled  - A1C 8.7% on3/8/21  - was not taking any  meds for DM at home  - metformin 500mg bid + lantus 20 qhs  - consider increasing metformin and lowering lantus dose  - FS well controlled now    # Hyponatremia, ? SIADH  # Hypokalemia  - stop hctz, C/W salt tabs for now, fluid restriction  - will order urine sodium and osm, serum osm 3/19/21    #HTN  - Clonidine, Nifedipine, and Losartan   - Hctz discontinued due to hyponatremia and hypokalemia    #HLD  - c/w Atorvastatin    #LBP  - CT lumbar spine negative for acute pathology  - lidocaine patch trial 3/19/21  - agree can consider medrol dose pack if lidocaine unsuccessful    #Smoking history  - c/w Nicotine patch daily  - advised cessation    #insomnia- Melatonin PRN    #DVT prophylaxis- Lovenox

## 2021-03-19 NOTE — PROGRESS NOTE ADULT - SUBJECTIVE AND OBJECTIVE BOX
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   Patient seen and examined at bedside.  Patient complains of back pain.    ALLERGIES:  No Known Allergies    MEDICATIONS  (STANDING):  acetaminophen   Tablet .. 975 milliGRAM(s) Oral every 8 hours  atorvastatin 80 milliGRAM(s) Oral at bedtime  cloNIDine 0.3 milliGRAM(s) Oral three times a day  dextrose 40% Gel 15 Gram(s) Oral once  dextrose 5%. 1000 milliLiter(s) (50 mL/Hr) IV Continuous <Continuous>  dextrose 5%. 1000 milliLiter(s) (100 mL/Hr) IV Continuous <Continuous>  dextrose 50% Injectable 25 Gram(s) IV Push once  dextrose 50% Injectable 12.5 Gram(s) IV Push once  dextrose 50% Injectable 25 Gram(s) IV Push once  enoxaparin Injectable 40 milliGRAM(s) SubCutaneous <User Schedule>  folic acid 1 milliGRAM(s) Oral daily  gabapentin 300 milliGRAM(s) Oral at bedtime  glucagon  Injectable 1 milliGRAM(s) IntraMuscular once  influenza   Vaccine 0.5 milliLiter(s) IntraMuscular once  insulin glargine Injectable (LANTUS) 20 Unit(s) SubCutaneous at bedtime  insulin lispro (ADMELOG) corrective regimen sliding scale   SubCutaneous at bedtime  insulin lispro (ADMELOG) corrective regimen sliding scale   SubCutaneous three times a day before meals  lactobacillus acidophilus 1 Tablet(s) Oral daily  lidocaine   Patch 1 Patch Transdermal <User Schedule>  losartan 100 milliGRAM(s) Oral daily  melatonin 6 milliGRAM(s) Oral at bedtime  metFORMIN 500 milliGRAM(s) Oral two times a day  multivitamin 1 Tablet(s) Oral daily  nicotine -  14 mG/24Hr(s) Patch 1 patch Transdermal daily  NIFEdipine XL 90 milliGRAM(s) Oral daily  sodium chloride 1 Gram(s) Oral two times a day  thiamine 100 milliGRAM(s) Oral daily    MEDICATIONS  (PRN):  aluminum hydroxide/magnesium hydroxide/simethicone Suspension 30 milliLiter(s) Oral every 4 hours PRN Dyspepsia  traMADol 25 milliGRAM(s) Oral every 6 hours PRN Moderate Pain (4 - 6)  traMADol 50 milliGRAM(s) Oral every 6 hours PRN Severe Pain (7 - 10)    Vital Signs Last 24 Hrs  T(F): 98.1 (19 Mar 2021 08:33), Max: 98.4 (18 Mar 2021 21:19)  HR: 76 (19 Mar 2021 08:33) (72 - 84)  BP: 100/70 (19 Mar 2021 08:33) (100/70 - 138/88)  RR: 15 (19 Mar 2021 08:33) (15 - 15)  SpO2: 99% (19 Mar 2021 08:33) (95% - 99%)    GENERAL- NAD  EAR/NOSE/MOUTH/THROAT - no pharyngeal exudates, no oral lesions  MMM  EYES- MAURY, conjunctiva and Sclera clear  NECK- supple  RESPIRATORY-  clear to auscultation bilaterally  CARDIOVASCULAR - SIS2, RRR  GI - soft NT BS present  EXTREMITIES- no pedal edema  NEUROLOGY- no gross focal deficits, no facial asymmetry.   SKIN- no rashes, warm to touch  PSYCHIATRY- AAO X 3  MUSCULOSKELETAL- ROM normal    LABS:                        15.3   8.12  )-----------( 262      ( 18 Mar 2021 05:30 )             43.4       03-18    127  |  93  |  16  ----------------------------<  133  4.0   |  24  |  0.70    Ca    9.3      18 Mar 2021 05:30       eGFR if Non African American: 105 mL/min/1.73M2 (03-18-21 @ 05:30)  eGFR if African American: 122 mL/min/1.73M2 (03-18-21 @ 05:30)    03-08 Chol 234 mg/dL LDL -- HDL 54 mg/dL Trig 200 mg/dL    POCT Blood Glucose.: 169 mg/dL (19 Mar 2021 07:53)  POCT Blood Glucose.: 174 mg/dL (18 Mar 2021 21:14)  POCT Blood Glucose.: 132 mg/dL (18 Mar 2021 16:57)  POCT Blood Glucose.: 131 mg/dL (18 Mar 2021 11:49)    Culture - Blood (collected 13 Mar 2021 17:30)  Source: .Blood Blood-Venous  Final Report (19 Mar 2021 02:01):    No Growth Final    Culture - Blood (collected 13 Mar 2021 17:30)  Source: .Blood Blood-Venous  Final Report (19 Mar 2021 02:01):    No Growth Final

## 2021-03-19 NOTE — PROGRESS NOTE ADULT - ASSESSMENT
PENNIE GIANG is a 56M with PMHx of HTN (non-compliant with medications) who presented to [facility] on 03/07/2021 with HA and imbalance s/p fall on 3/6, admission SBP >220, found to have right thalamic hemorrhage with intraventricular extension. Admitted for multidisciplinary rehab program.      #Comprehensive Multidisciplinary Rehab Program:  - Gait, ADL, Functional impairments  - PT/OT/ SLP 3 hours a day 5 days a week    #Right thalamic hemorrhage with intraventricular extension:- likely 2/2 uncontrolled HTN  - s/p cerebral angiography-->no evidence of AVM; last CT on 3/9 was stable  - per neuro, MRI in 4-6 weeks to r/o vascular malformations  - BP control, as below    #LBP:- symptoms radicular in nature; denies numbness or paresthesias  - c/w Gabapentin to 300mg qhs  - Tylenol 975mg q8h standing for 3 days  - start Lidocaine patch daily with Lidocaine gel for low back at night    #Hyponatremia  - overall downtrending Na; DC HCTZ  Urine studies suggestive of SIADH- fluid restriction started, c/w salt tabs  Labs in am     HTN  - c/w Clonidine 0.3mg TID, HCTZ 25mg daily, Nifedipine 90mg daily, and Losartan 100mg daily    Fever:-at admission, - s/p J&J vaccine prior to admission; ID w/u neg    HLD:- c/w Atorvastatin 80mg qhs    Smoking history  - c/w Nicotine patch daily    #Sleep:- Melatonin PRN    #Pain:  - Tylenol 650mg q6h PRN mild pain, Tramadol 25mg q6h PRN moderate pain, and Tramadol 50mg q6h PRN severe pain  - warm compress to low back PRN  - avoid sedating meds that may affect cognitive recovery    #GI/Bowel:- Senna 2 tabs qhs and Miralax daily    #/Bladder:- Toileting schedule q4h    #Diet :- Diet: DASH/TLC and carb consistent  - Nutrition to follow    #Skin/ Pressure Injury Prevention: Turn Q2hrs in bed while awake, OOB to Chair, PT/OT    #DVT prophylaxis:  - Lovenox 40mg daily  - SCDs    #Precautions/ Restrictions  - Falls    #Dispo: IDR 03/16/2021  - OT: setup for eating and grooming; Cat with UE/LE dressing; Cat toilet transfer and bathing  - PT: Cat for transfers; ambulates 40' with RW Cat  - SLP: continuing for cognitive/executive function  - Barriers: left inattention  - TDD: 3/27 to home normal

## 2021-03-19 NOTE — PROGRESS NOTE ADULT - ATTENDING COMMENTS
Chart reviewed. Patient seen at bedside  Continues to have back pain and with radiation down LE. today reports some numbness in posterior thighs( states that he always had it, but did not report to us) . Started on lidoderm patch to back and to take tramadol prior to therapy. Per OT did much better during session this am.   If continues to have pain, will start medrol dose lida     2. fu BMP in am - On fluid restriction and salt tabs for SIADH   Patient off diuretic    3. Continue rehab program

## 2021-03-20 LAB
ANION GAP SERPL CALC-SCNC: 5 MMOL/L — SIGNIFICANT CHANGE UP (ref 5–17)
BUN SERPL-MCNC: 15 MG/DL — SIGNIFICANT CHANGE UP (ref 7–23)
CALCIUM SERPL-MCNC: 9.7 MG/DL — SIGNIFICANT CHANGE UP (ref 8.4–10.5)
CHLORIDE SERPL-SCNC: 100 MMOL/L — SIGNIFICANT CHANGE UP (ref 96–108)
CO2 SERPL-SCNC: 31 MMOL/L — SIGNIFICANT CHANGE UP (ref 22–31)
CREAT SERPL-MCNC: 0.85 MG/DL — SIGNIFICANT CHANGE UP (ref 0.5–1.3)
GLUCOSE BLDC GLUCOMTR-MCNC: 126 MG/DL — HIGH (ref 70–99)
GLUCOSE BLDC GLUCOMTR-MCNC: 132 MG/DL — HIGH (ref 70–99)
GLUCOSE BLDC GLUCOMTR-MCNC: 134 MG/DL — HIGH (ref 70–99)
GLUCOSE BLDC GLUCOMTR-MCNC: 156 MG/DL — HIGH (ref 70–99)
GLUCOSE SERPL-MCNC: 132 MG/DL — HIGH (ref 70–99)
POTASSIUM SERPL-MCNC: 5.1 MMOL/L — SIGNIFICANT CHANGE UP (ref 3.5–5.3)
POTASSIUM SERPL-SCNC: 5.1 MMOL/L — SIGNIFICANT CHANGE UP (ref 3.5–5.3)
SODIUM SERPL-SCNC: 136 MMOL/L — SIGNIFICANT CHANGE UP (ref 135–145)

## 2021-03-20 PROCEDURE — 99232 SBSQ HOSP IP/OBS MODERATE 35: CPT

## 2021-03-20 RX ADMIN — LIDOCAINE 1 PATCH: 4 CREAM TOPICAL at 19:24

## 2021-03-20 RX ADMIN — METFORMIN HYDROCHLORIDE 500 MILLIGRAM(S): 850 TABLET ORAL at 05:27

## 2021-03-20 RX ADMIN — SODIUM CHLORIDE 1 GRAM(S): 9 INJECTION INTRAMUSCULAR; INTRAVENOUS; SUBCUTANEOUS at 17:21

## 2021-03-20 RX ADMIN — METFORMIN HYDROCHLORIDE 500 MILLIGRAM(S): 850 TABLET ORAL at 17:21

## 2021-03-20 RX ADMIN — Medication 975 MILLIGRAM(S): at 22:54

## 2021-03-20 RX ADMIN — Medication 6 MILLIGRAM(S): at 21:40

## 2021-03-20 RX ADMIN — ENOXAPARIN SODIUM 40 MILLIGRAM(S): 100 INJECTION SUBCUTANEOUS at 17:21

## 2021-03-20 RX ADMIN — Medication 1 PATCH: at 21:45

## 2021-03-20 RX ADMIN — Medication 1 PATCH: at 11:39

## 2021-03-20 RX ADMIN — GABAPENTIN 300 MILLIGRAM(S): 400 CAPSULE ORAL at 21:41

## 2021-03-20 RX ADMIN — Medication 975 MILLIGRAM(S): at 14:10

## 2021-03-20 RX ADMIN — Medication 0.3 MILLIGRAM(S): at 14:12

## 2021-03-20 RX ADMIN — TRAMADOL HYDROCHLORIDE 50 MILLIGRAM(S): 50 TABLET ORAL at 22:54

## 2021-03-20 RX ADMIN — Medication 975 MILLIGRAM(S): at 15:06

## 2021-03-20 RX ADMIN — Medication 975 MILLIGRAM(S): at 21:40

## 2021-03-20 RX ADMIN — Medication 1 TABLET(S): at 11:40

## 2021-03-20 RX ADMIN — TRAMADOL HYDROCHLORIDE 50 MILLIGRAM(S): 50 TABLET ORAL at 21:41

## 2021-03-20 RX ADMIN — LOSARTAN POTASSIUM 100 MILLIGRAM(S): 100 TABLET, FILM COATED ORAL at 05:26

## 2021-03-20 RX ADMIN — LIDOCAINE 1 APPLICATION(S): 4 CREAM TOPICAL at 21:41

## 2021-03-20 RX ADMIN — Medication 2: at 08:09

## 2021-03-20 RX ADMIN — Medication 1 PATCH: at 11:45

## 2021-03-20 RX ADMIN — INSULIN GLARGINE 20 UNIT(S): 100 INJECTION, SOLUTION SUBCUTANEOUS at 21:41

## 2021-03-20 RX ADMIN — Medication 0.3 MILLIGRAM(S): at 21:40

## 2021-03-20 RX ADMIN — Medication 100 MILLIGRAM(S): at 11:40

## 2021-03-20 RX ADMIN — ATORVASTATIN CALCIUM 80 MILLIGRAM(S): 80 TABLET, FILM COATED ORAL at 21:40

## 2021-03-20 RX ADMIN — Medication 1 MILLIGRAM(S): at 11:40

## 2021-03-20 RX ADMIN — Medication 1 PATCH: at 07:46

## 2021-03-20 RX ADMIN — LIDOCAINE 1 PATCH: 4 CREAM TOPICAL at 08:09

## 2021-03-20 RX ADMIN — Medication 0.3 MILLIGRAM(S): at 05:26

## 2021-03-20 RX ADMIN — SODIUM CHLORIDE 1 GRAM(S): 9 INJECTION INTRAMUSCULAR; INTRAVENOUS; SUBCUTANEOUS at 05:26

## 2021-03-20 RX ADMIN — Medication 975 MILLIGRAM(S): at 05:55

## 2021-03-20 RX ADMIN — LIDOCAINE 1 PATCH: 4 CREAM TOPICAL at 21:45

## 2021-03-20 RX ADMIN — Medication 975 MILLIGRAM(S): at 05:25

## 2021-03-20 RX ADMIN — Medication 90 MILLIGRAM(S): at 05:26

## 2021-03-20 NOTE — PROGRESS NOTE ADULT - ASSESSMENT
56M with PMHx of HTN (non-compliant with medications) who presented to [facility] on 03/07/2021 with HA and imbalance s/p fall on 3/6, admission SBP >220, found to have right thalamic hemorrhage with intraventricular extension.  Admitted for multidisciplinary rehab program- pt/ot/dvt ppx    #Right thalamic hemorrhage with intraventricular extension  - suspect secondary to uncontrolled HTN, per chart patient non complaint with meds  - s/p cerebral angiography-->no evidence of AVM; last CT on 3/9 was stable  - per neuro, MRI in 4-6 weeks to r/o vascular malformations    #HTN  - Clonidine, Nifedipine, and Losartan   - Hctz discontinued due to hyponatremia and hypokalemia  - BP controlled  - goal < 140/90    #DM2- uncontrolled  - A1C 8.7% on3/8/21  - was not taking any  meds for DM at home  - metformin 500mg bid + lantus 20 qhs  - consider increasing metformin and lowering lantus dose  - FS well controlled now    # Hyponatremia, ? SIADH  # Hypokalemia -resolved  - hctz stopped  - C/W salt tabs for now, 1.2L fluid restriction   - urine Na: 26 urine Osmo: 821 (3/16)    #HLD  - c/w Atorvastatin    #LBP  - CT lumbar spine negative for acute pathology  - lidocaine patch trial 3/19/21  - consider medrol dose pack if lidocaine unsuccessful    #Smoking history  - c/w Nicotine patch daily  - advised cessation    #insomnia- Melatonin PRN    #DVT prophylaxis- Lovenox

## 2021-03-20 NOTE — PROGRESS NOTE ADULT - SUBJECTIVE AND OBJECTIVE BOX
No overnight events.  Slept well.  Reports NO pain.  Pain controlled, no chest pain, no N/V, no Fevers/Chills. No other new ROS.  Has been tolerating rehabilitation program.    VITALS  T(C): 36.7 (03-19-21 @ 20:27), Max: 36.7 (03-19-21 @ 08:33)  HR: 80 (03-20-21 @ 05:24) (76 - 98)  BP: 133/89 (03-20-21 @ 05:24) (100/70 - 142/91)  RR: 15 (03-19-21 @ 20:27) (15 - 15)  SpO2: 99% (03-19-21 @ 20:27) (99% - 99%)  Wt(kg): --     MEDICATIONS   acetaminophen   Tablet .. 975 milliGRAM(s) every 8 hours  aluminum hydroxide/magnesium hydroxide/simethicone Suspension 30 milliLiter(s) every 4 hours PRN  atorvastatin 80 milliGRAM(s) at bedtime  cloNIDine 0.3 milliGRAM(s) three times a day  dextrose 40% Gel 15 Gram(s) once  dextrose 5%. 1000 milliLiter(s) <Continuous>  dextrose 5%. 1000 milliLiter(s) <Continuous>  dextrose 50% Injectable 25 Gram(s) once  dextrose 50% Injectable 12.5 Gram(s) once  dextrose 50% Injectable 25 Gram(s) once  enoxaparin Injectable 40 milliGRAM(s) <User Schedule>  folic acid 1 milliGRAM(s) daily  gabapentin 300 milliGRAM(s) at bedtime  glucagon  Injectable 1 milliGRAM(s) once  influenza   Vaccine 0.5 milliLiter(s) once  insulin glargine Injectable (LANTUS) 20 Unit(s) at bedtime  insulin lispro (ADMELOG) corrective regimen sliding scale   three times a day before meals  insulin lispro (ADMELOG) corrective regimen sliding scale   at bedtime  lactobacillus acidophilus 1 Tablet(s) daily  lidocaine   Patch 1 Patch <User Schedule>  lidocaine 4% Topical Solution 1 Application(s) <User Schedule>  losartan 100 milliGRAM(s) daily  melatonin 6 milliGRAM(s) at bedtime  metFORMIN 500 milliGRAM(s) two times a day  multivitamin 1 Tablet(s) daily  nicotine -  14 mG/24Hr(s) Patch 1 patch daily  NIFEdipine XL 90 milliGRAM(s) daily  sodium chloride 1 Gram(s) two times a day  thiamine 100 milliGRAM(s) daily  traMADol 25 milliGRAM(s) every 6 hours PRN  traMADol 50 milliGRAM(s) every 6 hours PRN      RECENT LABS/IMAGING                    POCT Blood Glucose.: 156 mg/dL (03-20-21 @ 07:37)  POCT Blood Glucose.: 130 mg/dL (03-19-21 @ 21:39)  POCT Blood Glucose.: 109 mg/dL (03-19-21 @ 16:44)  POCT Blood Glucose.: 139 mg/dL (03-19-21 @ 11:53)        ------------------------------------------  PHYSICAL EXAM  Constitutional - NAD, Comfortable  Pulm - Breathing comfortably, wheezing  Abd - Nondistended  Extremities - No calf tenderness  Neurologic Exam - Awake, Alert            Motor - Exam unchanged  Psychiatric - Mood WNL     ASSESSMENT/PLAN  56y Male with impairments in mobility and ADLs   - Continue current rehabilitation program 3hrs a day   - Continue current medications, patient is medically stable   - DVT prophylaxis    - Skin - OOB and mobilization daily

## 2021-03-21 LAB
GLUCOSE BLDC GLUCOMTR-MCNC: 112 MG/DL — HIGH (ref 70–99)
GLUCOSE BLDC GLUCOMTR-MCNC: 128 MG/DL — HIGH (ref 70–99)
GLUCOSE BLDC GLUCOMTR-MCNC: 134 MG/DL — HIGH (ref 70–99)
GLUCOSE BLDC GLUCOMTR-MCNC: 140 MG/DL — HIGH (ref 70–99)
OSMOLALITY UR: 670 MOSM/KG — SIGNIFICANT CHANGE UP (ref 50–1200)
SODIUM UR-SCNC: 51 MMOL/L — SIGNIFICANT CHANGE UP

## 2021-03-21 PROCEDURE — 99232 SBSQ HOSP IP/OBS MODERATE 35: CPT

## 2021-03-21 RX ORDER — INSULIN GLARGINE 100 [IU]/ML
14 INJECTION, SOLUTION SUBCUTANEOUS AT BEDTIME
Refills: 0 | Status: DISCONTINUED | OUTPATIENT
Start: 2021-03-21 | End: 2021-03-23

## 2021-03-21 RX ORDER — METFORMIN HYDROCHLORIDE 850 MG/1
1000 TABLET ORAL
Refills: 0 | Status: DISCONTINUED | OUTPATIENT
Start: 2021-03-21 | End: 2021-03-26

## 2021-03-21 RX ADMIN — METFORMIN HYDROCHLORIDE 1000 MILLIGRAM(S): 850 TABLET ORAL at 17:25

## 2021-03-21 RX ADMIN — ATORVASTATIN CALCIUM 80 MILLIGRAM(S): 80 TABLET, FILM COATED ORAL at 21:40

## 2021-03-21 RX ADMIN — Medication 1 TABLET(S): at 11:38

## 2021-03-21 RX ADMIN — TRAMADOL HYDROCHLORIDE 50 MILLIGRAM(S): 50 TABLET ORAL at 21:41

## 2021-03-21 RX ADMIN — SODIUM CHLORIDE 1 GRAM(S): 9 INJECTION INTRAMUSCULAR; INTRAVENOUS; SUBCUTANEOUS at 05:22

## 2021-03-21 RX ADMIN — LIDOCAINE 1 PATCH: 4 CREAM TOPICAL at 18:59

## 2021-03-21 RX ADMIN — Medication 1 PATCH: at 11:51

## 2021-03-21 RX ADMIN — Medication 6 MILLIGRAM(S): at 21:40

## 2021-03-21 RX ADMIN — Medication 1 PATCH: at 11:38

## 2021-03-21 RX ADMIN — LIDOCAINE 1 APPLICATION(S): 4 CREAM TOPICAL at 21:40

## 2021-03-21 RX ADMIN — Medication 1 MILLIGRAM(S): at 11:39

## 2021-03-21 RX ADMIN — Medication 975 MILLIGRAM(S): at 05:51

## 2021-03-21 RX ADMIN — Medication 0.3 MILLIGRAM(S): at 21:40

## 2021-03-21 RX ADMIN — Medication 100 MILLIGRAM(S): at 11:39

## 2021-03-21 RX ADMIN — METFORMIN HYDROCHLORIDE 500 MILLIGRAM(S): 850 TABLET ORAL at 05:23

## 2021-03-21 RX ADMIN — SODIUM CHLORIDE 1 GRAM(S): 9 INJECTION INTRAMUSCULAR; INTRAVENOUS; SUBCUTANEOUS at 17:25

## 2021-03-21 RX ADMIN — Medication 0.3 MILLIGRAM(S): at 05:22

## 2021-03-21 RX ADMIN — ENOXAPARIN SODIUM 40 MILLIGRAM(S): 100 INJECTION SUBCUTANEOUS at 17:25

## 2021-03-21 RX ADMIN — TRAMADOL HYDROCHLORIDE 50 MILLIGRAM(S): 50 TABLET ORAL at 22:34

## 2021-03-21 RX ADMIN — GABAPENTIN 300 MILLIGRAM(S): 400 CAPSULE ORAL at 21:40

## 2021-03-21 RX ADMIN — Medication 1 PATCH: at 08:08

## 2021-03-21 RX ADMIN — Medication 0.3 MILLIGRAM(S): at 13:46

## 2021-03-21 RX ADMIN — Medication 975 MILLIGRAM(S): at 05:21

## 2021-03-21 RX ADMIN — LIDOCAINE 1 PATCH: 4 CREAM TOPICAL at 08:08

## 2021-03-21 RX ADMIN — LIDOCAINE 1 PATCH: 4 CREAM TOPICAL at 21:46

## 2021-03-21 RX ADMIN — Medication 1 TABLET(S): at 11:39

## 2021-03-21 RX ADMIN — Medication 90 MILLIGRAM(S): at 05:22

## 2021-03-21 RX ADMIN — LOSARTAN POTASSIUM 100 MILLIGRAM(S): 100 TABLET, FILM COATED ORAL at 05:22

## 2021-03-21 RX ADMIN — Medication 1 PATCH: at 18:59

## 2021-03-21 RX ADMIN — INSULIN GLARGINE 14 UNIT(S): 100 INJECTION, SOLUTION SUBCUTANEOUS at 21:40

## 2021-03-21 NOTE — PROGRESS NOTE ADULT - SUBJECTIVE AND OBJECTIVE BOX
No overnight events.  Slept well.  Reports no back pain.  Pain controlled, no chest pain, no N/V, no Fevers/Chills. No other new ROS.  Has been tolerating rehabilitation program.    VITALS  T(C): 36.8 (03-20-21 @ 20:01), Max: 36.8 (03-20-21 @ 08:58)  HR: 77 (03-21-21 @ 05:20) (71 - 92)  BP: 134/94 (03-21-21 @ 05:20) (100/60 - 135/84)  RR: 15 (03-20-21 @ 20:01) (15 - 15)  SpO2: 99% (03-20-21 @ 20:01) (99% - 99%)  Wt(kg): --     MEDICATIONS   aluminum hydroxide/magnesium hydroxide/simethicone Suspension 30 milliLiter(s) every 4 hours PRN  atorvastatin 80 milliGRAM(s) at bedtime  cloNIDine 0.3 milliGRAM(s) three times a day  dextrose 40% Gel 15 Gram(s) once  dextrose 5%. 1000 milliLiter(s) <Continuous>  dextrose 5%. 1000 milliLiter(s) <Continuous>  dextrose 50% Injectable 25 Gram(s) once  dextrose 50% Injectable 12.5 Gram(s) once  dextrose 50% Injectable 25 Gram(s) once  enoxaparin Injectable 40 milliGRAM(s) <User Schedule>  folic acid 1 milliGRAM(s) daily  gabapentin 300 milliGRAM(s) at bedtime  glucagon  Injectable 1 milliGRAM(s) once  influenza   Vaccine 0.5 milliLiter(s) once  insulin glargine Injectable (LANTUS) 20 Unit(s) at bedtime  insulin lispro (ADMELOG) corrective regimen sliding scale   three times a day before meals  insulin lispro (ADMELOG) corrective regimen sliding scale   at bedtime  lactobacillus acidophilus 1 Tablet(s) daily  lidocaine   Patch 1 Patch <User Schedule>  lidocaine 4% Topical Solution 1 Application(s) <User Schedule>  losartan 100 milliGRAM(s) daily  melatonin 6 milliGRAM(s) at bedtime  metFORMIN 500 milliGRAM(s) two times a day  multivitamin 1 Tablet(s) daily  nicotine -  14 mG/24Hr(s) Patch 1 patch daily  NIFEdipine XL 90 milliGRAM(s) daily  sodium chloride 1 Gram(s) two times a day  thiamine 100 milliGRAM(s) daily  traMADol 25 milliGRAM(s) every 6 hours PRN  traMADol 50 milliGRAM(s) every 6 hours PRN      RECENT LABS/IMAGING    03-20    136  |  100  |  15  ----------------------------<  132<H>  5.1   |  31  |  0.85    Ca    9.7      20 Mar 2021 06:00                POCT Blood Glucose.: 134 mg/dL (03-21-21 @ 07:34)  POCT Blood Glucose.: 134 mg/dL (03-20-21 @ 21:39)  POCT Blood Glucose.: 126 mg/dL (03-20-21 @ 16:37)  POCT Blood Glucose.: 132 mg/dL (03-20-21 @ 11:39)        ------------------------------------------  PHYSICAL EXAM  Constitutional - NAD, Comfortable  Pulm - Breathing comfortably, No wheezing  Abd - Nondistended  Extremities - No calf tenderness  Neurologic Exam - Awake, Alert  Psychiatric - Mood WNL     ASSESSMENT/PLAN  56y Male with impairments in mobility and ADLs   - Continue current rehabilitation program 3hrs a day   - Continue current medications, patient is medically stable   - DVT prophylaxis  - Skin - OOB and mobilization daily

## 2021-03-21 NOTE — PROGRESS NOTE ADULT - ASSESSMENT
56M with PMHx of HTN (non-compliant with medications) who presented to [facility] on 03/07/2021 with HA and imbalance s/p fall on 3/6, admission SBP >220, found to have right thalamic hemorrhage with intraventricular extension.  Admitted for multidisciplinary rehab program- pt/ot/dvt ppx    #Right thalamic hemorrhage with intraventricular extension  - suspect secondary to uncontrolled HTN, per chart patient non complaint with meds  - s/p cerebral angiography-->no evidence of AVM; last CT on 3/9 was stable  - per neuro, MRI in 4-6 weeks to r/o vascular malformations  - statin     #HTN  - Clonidine, Nifedipine, and Losartan   - Hctz discontinued due to hyponatremia and hypokalemia  - BP controlled  - goal < 140/90    #DM2- uncontrolled  - A1C 8.7% on3/8/21  - was not taking any  meds for DM at home  - increase metformin to 1000mg BID and reduce lantus to 14u qHS  - FS well controlled now    # Hyponatremia, possibly SIADH  # Hypokalemia -resolved  - hctz stopped  - C/W salt tabs for now, 1.2L fluid restriction   - urine Na: 26 urine Osmo: 821 (3/16)  - bmp tomorrow AM    #HLD  - c/w Atorvastatin    #LBP  - CT lumbar spine negative for acute pathology  - lidocaine patch trial 3/19/21  - consider medrol dose pack if lidocaine unsuccessful    #Smoking history  - c/w Nicotine patch daily  - advised cessation    #insomnia- Melatonin PRN    #DVT prophylaxis- Lovenox

## 2021-03-21 NOTE — PROGRESS NOTE ADULT - SUBJECTIVE AND OBJECTIVE BOX
Patient is a 56y old  Male who presents with a chief complaint of 1.1 Right thalamic hemorrhage with IVH (20 Mar 2021 11:31)      Patient seen and examined at bedside. no complaints. + BM. no sob, cp, headache, dizziness    ALLERGIES:  No Known Allergies    MEDICATIONS  (STANDING):  atorvastatin 80 milliGRAM(s) Oral at bedtime  cloNIDine 0.3 milliGRAM(s) Oral three times a day  dextrose 40% Gel 15 Gram(s) Oral once  dextrose 5%. 1000 milliLiter(s) (50 mL/Hr) IV Continuous <Continuous>  dextrose 5%. 1000 milliLiter(s) (100 mL/Hr) IV Continuous <Continuous>  dextrose 50% Injectable 25 Gram(s) IV Push once  dextrose 50% Injectable 25 Gram(s) IV Push once  dextrose 50% Injectable 12.5 Gram(s) IV Push once  enoxaparin Injectable 40 milliGRAM(s) SubCutaneous <User Schedule>  folic acid 1 milliGRAM(s) Oral daily  gabapentin 300 milliGRAM(s) Oral at bedtime  glucagon  Injectable 1 milliGRAM(s) IntraMuscular once  influenza   Vaccine 0.5 milliLiter(s) IntraMuscular once  insulin glargine Injectable (LANTUS) 20 Unit(s) SubCutaneous at bedtime  insulin lispro (ADMELOG) corrective regimen sliding scale   SubCutaneous at bedtime  insulin lispro (ADMELOG) corrective regimen sliding scale   SubCutaneous three times a day before meals  lactobacillus acidophilus 1 Tablet(s) Oral daily  lidocaine   Patch 1 Patch Transdermal <User Schedule>  lidocaine 4% Topical Solution 1 Application(s) Topical <User Schedule>  losartan 100 milliGRAM(s) Oral daily  melatonin 6 milliGRAM(s) Oral at bedtime  metFORMIN 500 milliGRAM(s) Oral two times a day  multivitamin 1 Tablet(s) Oral daily  nicotine -  14 mG/24Hr(s) Patch 1 patch Transdermal daily  NIFEdipine XL 90 milliGRAM(s) Oral daily  sodium chloride 1 Gram(s) Oral two times a day  thiamine 100 milliGRAM(s) Oral daily    MEDICATIONS  (PRN):  aluminum hydroxide/magnesium hydroxide/simethicone Suspension 30 milliLiter(s) Oral every 4 hours PRN Dyspepsia  traMADol 25 milliGRAM(s) Oral every 6 hours PRN Moderate Pain (4 - 6)  traMADol 50 milliGRAM(s) Oral every 6 hours PRN Severe Pain (7 - 10)    Vital Signs Last 24 Hrs  T(F): 98.3 (21 Mar 2021 08:23), Max: 98.3 (20 Mar 2021 20:01)  HR: 71 (21 Mar 2021 08:23) (71 - 92)  BP: 123/85 (21 Mar 2021 08:23) (116/77 - 135/84)  RR: 15 (21 Mar 2021 08:23) (15 - 15)  SpO2: 99% (21 Mar 2021 08:23) (99% - 99%)  I&O's Summary    20 Mar 2021 07:01  -  21 Mar 2021 07:00  --------------------------------------------------------  IN: 200 mL / OUT: 0 mL / NET: 200 mL      PHYSICAL EXAM:  General: NAD, awake alert answering questions   ENT: MMM  Neck: Supple, No JVD  Lungs: Clear to auscultation bilaterally  Cardio: RRR, S1/S2, No murmurs  Abdomen: Soft, Nontender, Nondistended; Bowel sounds present  Extremities: No calf tenderness, No pitting edema    LABS:    03-20    136  |  100  |  15  ----------------------------<  132  5.1   |  31  |  0.85    Ca    9.7      20 Mar 2021 06:00      eGFR if Non African American: 98 mL/min/1.73M2 (03-20-21 @ 06:00)  eGFR if : 113 mL/min/1.73M2 (03-20-21 @ 06:00)            03-08 Chol 234 mg/dL LDL -- HDL 54 mg/dL Trig 200 mg/dL          POCT Blood Glucose.: 140 mg/dL (21 Mar 2021 11:37)  POCT Blood Glucose.: 134 mg/dL (21 Mar 2021 07:34)  POCT Blood Glucose.: 134 mg/dL (20 Mar 2021 21:39)  POCT Blood Glucose.: 126 mg/dL (20 Mar 2021 16:37)            RADIOLOGY & ADDITIONAL TESTS:    Care Discussed with Consultants/Other Providers:

## 2021-03-22 LAB
ANION GAP SERPL CALC-SCNC: 2 MMOL/L — LOW (ref 5–17)
ANION GAP SERPL CALC-SCNC: 9 MMOL/L — SIGNIFICANT CHANGE UP (ref 5–17)
BASOPHILS # BLD AUTO: 0.04 K/UL — SIGNIFICANT CHANGE UP (ref 0–0.2)
BASOPHILS NFR BLD AUTO: 0.7 % — SIGNIFICANT CHANGE UP (ref 0–2)
BUN SERPL-MCNC: 19 MG/DL — SIGNIFICANT CHANGE UP (ref 7–23)
BUN SERPL-MCNC: 24 MG/DL — HIGH (ref 7–23)
CALCIUM SERPL-MCNC: 9.1 MG/DL — SIGNIFICANT CHANGE UP (ref 8.4–10.5)
CALCIUM SERPL-MCNC: 9.8 MG/DL — SIGNIFICANT CHANGE UP (ref 8.4–10.5)
CHLORIDE SERPL-SCNC: 103 MMOL/L — SIGNIFICANT CHANGE UP (ref 96–108)
CHLORIDE SERPL-SCNC: 99 MMOL/L — SIGNIFICANT CHANGE UP (ref 96–108)
CO2 SERPL-SCNC: 26 MMOL/L — SIGNIFICANT CHANGE UP (ref 22–31)
CO2 SERPL-SCNC: 31 MMOL/L — SIGNIFICANT CHANGE UP (ref 22–31)
CREAT SERPL-MCNC: 0.91 MG/DL — SIGNIFICANT CHANGE UP (ref 0.5–1.3)
CREAT SERPL-MCNC: 0.99 MG/DL — SIGNIFICANT CHANGE UP (ref 0.5–1.3)
EOSINOPHIL # BLD AUTO: 0.12 K/UL — SIGNIFICANT CHANGE UP (ref 0–0.5)
EOSINOPHIL NFR BLD AUTO: 2 % — SIGNIFICANT CHANGE UP (ref 0–6)
GLUCOSE BLDC GLUCOMTR-MCNC: 122 MG/DL — HIGH (ref 70–99)
GLUCOSE BLDC GLUCOMTR-MCNC: 123 MG/DL — HIGH (ref 70–99)
GLUCOSE BLDC GLUCOMTR-MCNC: 132 MG/DL — HIGH (ref 70–99)
GLUCOSE BLDC GLUCOMTR-MCNC: 132 MG/DL — HIGH (ref 70–99)
GLUCOSE SERPL-MCNC: 137 MG/DL — HIGH (ref 70–99)
GLUCOSE SERPL-MCNC: 151 MG/DL — HIGH (ref 70–99)
HCT VFR BLD CALC: 41.5 % — SIGNIFICANT CHANGE UP (ref 39–50)
HGB BLD-MCNC: 14 G/DL — SIGNIFICANT CHANGE UP (ref 13–17)
IMM GRANULOCYTES NFR BLD AUTO: 0.2 % — SIGNIFICANT CHANGE UP (ref 0–1.5)
LYMPHOCYTES # BLD AUTO: 1.77 K/UL — SIGNIFICANT CHANGE UP (ref 1–3.3)
LYMPHOCYTES # BLD AUTO: 28.8 % — SIGNIFICANT CHANGE UP (ref 13–44)
MCHC RBC-ENTMCNC: 30.8 PG — SIGNIFICANT CHANGE UP (ref 27–34)
MCHC RBC-ENTMCNC: 33.7 GM/DL — SIGNIFICANT CHANGE UP (ref 32–36)
MCV RBC AUTO: 91.2 FL — SIGNIFICANT CHANGE UP (ref 80–100)
MONOCYTES # BLD AUTO: 0.34 K/UL — SIGNIFICANT CHANGE UP (ref 0–0.9)
MONOCYTES NFR BLD AUTO: 5.5 % — SIGNIFICANT CHANGE UP (ref 2–14)
NEUTROPHILS # BLD AUTO: 3.86 K/UL — SIGNIFICANT CHANGE UP (ref 1.8–7.4)
NEUTROPHILS NFR BLD AUTO: 62.8 % — SIGNIFICANT CHANGE UP (ref 43–77)
NRBC # BLD: 0 /100 WBCS — SIGNIFICANT CHANGE UP (ref 0–0)
PLATELET # BLD AUTO: 273 K/UL — SIGNIFICANT CHANGE UP (ref 150–400)
POTASSIUM SERPL-MCNC: 4.5 MMOL/L — SIGNIFICANT CHANGE UP (ref 3.5–5.3)
POTASSIUM SERPL-MCNC: 5.5 MMOL/L — HIGH (ref 3.5–5.3)
POTASSIUM SERPL-SCNC: 4.5 MMOL/L — SIGNIFICANT CHANGE UP (ref 3.5–5.3)
POTASSIUM SERPL-SCNC: 5.5 MMOL/L — HIGH (ref 3.5–5.3)
RBC # BLD: 4.55 M/UL — SIGNIFICANT CHANGE UP (ref 4.2–5.8)
RBC # FLD: 11.9 % — SIGNIFICANT CHANGE UP (ref 10.3–14.5)
SODIUM SERPL-SCNC: 134 MMOL/L — LOW (ref 135–145)
SODIUM SERPL-SCNC: 136 MMOL/L — SIGNIFICANT CHANGE UP (ref 135–145)
WBC # BLD: 6.14 K/UL — SIGNIFICANT CHANGE UP (ref 3.8–10.5)
WBC # FLD AUTO: 6.14 K/UL — SIGNIFICANT CHANGE UP (ref 3.8–10.5)

## 2021-03-22 PROCEDURE — 99232 SBSQ HOSP IP/OBS MODERATE 35: CPT

## 2021-03-22 RX ORDER — SODIUM CHLORIDE 9 MG/ML
1 INJECTION INTRAMUSCULAR; INTRAVENOUS; SUBCUTANEOUS DAILY
Refills: 0 | Status: DISCONTINUED | OUTPATIENT
Start: 2021-03-23 | End: 2021-03-25

## 2021-03-22 RX ORDER — POLYETHYLENE GLYCOL 3350 17 G/17G
17 POWDER, FOR SOLUTION ORAL DAILY
Refills: 0 | Status: DISCONTINUED | OUTPATIENT
Start: 2021-03-22 | End: 2021-03-23

## 2021-03-22 RX ORDER — LOSARTAN POTASSIUM 100 MG/1
50 TABLET, FILM COATED ORAL DAILY
Refills: 0 | Status: DISCONTINUED | OUTPATIENT
Start: 2021-03-23 | End: 2021-03-24

## 2021-03-22 RX ORDER — HYDRALAZINE HCL 50 MG
10 TABLET ORAL THREE TIMES A DAY
Refills: 0 | Status: DISCONTINUED | OUTPATIENT
Start: 2021-03-23 | End: 2021-03-23

## 2021-03-22 RX ORDER — SENNA PLUS 8.6 MG/1
2 TABLET ORAL AT BEDTIME
Refills: 0 | Status: DISCONTINUED | OUTPATIENT
Start: 2021-03-22 | End: 2021-03-31

## 2021-03-22 RX ADMIN — INSULIN GLARGINE 14 UNIT(S): 100 INJECTION, SOLUTION SUBCUTANEOUS at 22:14

## 2021-03-22 RX ADMIN — METFORMIN HYDROCHLORIDE 1000 MILLIGRAM(S): 850 TABLET ORAL at 17:04

## 2021-03-22 RX ADMIN — Medication 90 MILLIGRAM(S): at 05:16

## 2021-03-22 RX ADMIN — METFORMIN HYDROCHLORIDE 1000 MILLIGRAM(S): 850 TABLET ORAL at 05:16

## 2021-03-22 RX ADMIN — Medication 1 TABLET(S): at 12:07

## 2021-03-22 RX ADMIN — Medication 6 MILLIGRAM(S): at 22:14

## 2021-03-22 RX ADMIN — Medication 1 PATCH: at 19:47

## 2021-03-22 RX ADMIN — LIDOCAINE 1 PATCH: 4 CREAM TOPICAL at 08:19

## 2021-03-22 RX ADMIN — Medication 100 MILLIGRAM(S): at 12:08

## 2021-03-22 RX ADMIN — POLYETHYLENE GLYCOL 3350 17 GRAM(S): 17 POWDER, FOR SOLUTION ORAL at 12:06

## 2021-03-22 RX ADMIN — ATORVASTATIN CALCIUM 80 MILLIGRAM(S): 80 TABLET, FILM COATED ORAL at 22:14

## 2021-03-22 RX ADMIN — LOSARTAN POTASSIUM 100 MILLIGRAM(S): 100 TABLET, FILM COATED ORAL at 05:16

## 2021-03-22 RX ADMIN — ENOXAPARIN SODIUM 40 MILLIGRAM(S): 100 INJECTION SUBCUTANEOUS at 17:04

## 2021-03-22 RX ADMIN — Medication 0.2 MILLIGRAM(S): at 22:14

## 2021-03-22 RX ADMIN — Medication 1 PATCH: at 07:26

## 2021-03-22 RX ADMIN — Medication 1 TABLET(S): at 12:06

## 2021-03-22 RX ADMIN — Medication 0.3 MILLIGRAM(S): at 05:15

## 2021-03-22 RX ADMIN — GABAPENTIN 300 MILLIGRAM(S): 400 CAPSULE ORAL at 22:14

## 2021-03-22 RX ADMIN — LIDOCAINE 1 APPLICATION(S): 4 CREAM TOPICAL at 22:15

## 2021-03-22 RX ADMIN — Medication 1 PATCH: at 12:07

## 2021-03-22 RX ADMIN — Medication 1 MILLIGRAM(S): at 12:07

## 2021-03-22 RX ADMIN — Medication 1 PATCH: at 12:00

## 2021-03-22 RX ADMIN — LIDOCAINE 1 PATCH: 4 CREAM TOPICAL at 20:47

## 2021-03-22 RX ADMIN — SODIUM CHLORIDE 1 GRAM(S): 9 INJECTION INTRAMUSCULAR; INTRAVENOUS; SUBCUTANEOUS at 05:16

## 2021-03-22 RX ADMIN — SENNA PLUS 2 TABLET(S): 8.6 TABLET ORAL at 22:14

## 2021-03-22 RX ADMIN — LIDOCAINE 1 PATCH: 4 CREAM TOPICAL at 20:46

## 2021-03-22 NOTE — PROGRESS NOTE ADULT - SUBJECTIVE AND OBJECTIVE BOX
DAILY PROGRESS NOTE:  HPI:  PENNIE GIANG is a 56M with PMHx of HTN (non-compliant with medications) who presented to [facility] on 03/07/2021 with HA and imbalance s/p fall on 3/6. SBP elevated on admisison >220, thus started on nicardipine gtt; CT head findings c/w right thalamic hemorrhage with intraventricular hemorrhage. Cerebral angiography on 3/8 did not show evidence of AVM. Nicardipine weaned off and started on Clonidine, HCTZ, Nifedipine, and Losartan. Repeat CT head on 3/9 with stable findings. Plan for MRI brain in 4-6 weeks to rule out underlying vascular malformation.    Patient was evaluated by PM&R and therapy for functional deficits and gait/ ADL impairments and recommended acute rehabilitation. Patient was medically optimized for discharge to [facility] on 03/12/2021.    On admission, patient noted to have temp 99.6F oral, 101F rectal. Patient denies feeling febrile, chills, SOB, dysuria, abd pain. COVID-19 PCR, UA and CXR ordered.  (13 Mar 2021 12:23)      Subjective:  Patient was seen and examined at the bedside this   Seen during ST session and telephone interpretor used at times   Patient is conversational in English  States back pain is much better, but he feels that he has gotten weaker since stroke      ROS:  Denied fever, chills, nausea, vomiting, CP, palpitations, coughing, dyspnea  No BM since 3/18       Physical Exam:    Vital Signs Last 24 Hrs  T(C): 36.8 (22 Mar 2021 08:56), Max: 37.3 (21 Mar 2021 19:34)  T(F): 98.2 (22 Mar 2021 08:56), Max: 99.2 (21 Mar 2021 19:34)  HR: 77 (22 Mar 2021 08:56) (77 - 96)  BP: 117/77 (22 Mar 2021 08:56) (117/77 - 127/89)  BP(mean): --  RR: 16 (22 Mar 2021 08:56) (15 - 16)  SpO2: 95% (22 Mar 2021 08:56) (95% - 98%)          Gen - NAD, Comfortable  Pulm - CTAB, No wheeze, No rhonchi  Cardiovascular - S1S2  Abdomen - Soft, NT/ND, +BS  Extremities - no edema or calf tenderness  Neuro-     Cognitive - awake and alert     Communication - Fluent, No dysarthria     Cranial Nerves - no facial asymmetry     Motor - 5/5      Psychiatric - Mood stable, Affect WNL      MEDICATIONS  (STANDING):  atorvastatin 80 milliGRAM(s) Oral at bedtime  cloNIDine 0.3 milliGRAM(s) Oral three times a day  dextrose 40% Gel 15 Gram(s) Oral once  dextrose 5%. 1000 milliLiter(s) (50 mL/Hr) IV Continuous <Continuous>  dextrose 5%. 1000 milliLiter(s) (100 mL/Hr) IV Continuous <Continuous>  dextrose 50% Injectable 25 Gram(s) IV Push once  dextrose 50% Injectable 25 Gram(s) IV Push once  dextrose 50% Injectable 12.5 Gram(s) IV Push once  enoxaparin Injectable 40 milliGRAM(s) SubCutaneous <User Schedule>  folic acid 1 milliGRAM(s) Oral daily  gabapentin 300 milliGRAM(s) Oral at bedtime  glucagon  Injectable 1 milliGRAM(s) IntraMuscular once  influenza   Vaccine 0.5 milliLiter(s) IntraMuscular once  insulin glargine Injectable (LANTUS) 14 Unit(s) SubCutaneous at bedtime  insulin lispro (ADMELOG) corrective regimen sliding scale   SubCutaneous three times a day before meals  insulin lispro (ADMELOG) corrective regimen sliding scale   SubCutaneous at bedtime  lactobacillus acidophilus 1 Tablet(s) Oral daily  lidocaine   Patch 1 Patch Transdermal <User Schedule>  lidocaine 4% Topical Solution 1 Application(s) Topical <User Schedule>  melatonin 6 milliGRAM(s) Oral at bedtime  metFORMIN 1000 milliGRAM(s) Oral two times a day  multivitamin 1 Tablet(s) Oral daily  nicotine -  14 mG/24Hr(s) Patch 1 patch Transdermal daily  NIFEdipine XL 90 milliGRAM(s) Oral daily  polyethylene glycol 3350 17 Gram(s) Oral daily  senna 2 Tablet(s) Oral at bedtime  sodium chloride 1 Gram(s) Oral two times a day  thiamine 100 milliGRAM(s) Oral daily    MEDICATIONS  (PRN):  aluminum hydroxide/magnesium hydroxide/simethicone Suspension 30 milliLiter(s) Oral every 4 hours PRN Dyspepsia  traMADol 25 milliGRAM(s) Oral every 6 hours PRN Moderate Pain (4 - 6)  traMADol 50 milliGRAM(s) Oral every 6 hours PRN Severe Pain (7 - 10)                              14.0   6.14  )-----------( 273      ( 22 Mar 2021 05:00 )             41.5     03-22    136  |  103  |  19  ----------------------------<  151<H>  5.5<H>   |  31  |  0.99    Ca    9.1      22 Mar 2021 05:00    CAPILLARY BLOOD GLUCOSE      POCT Blood Glucose.: 122 mg/dL (22 Mar 2021 11:22)  POCT Blood Glucose.: 132 mg/dL (22 Mar 2021 07:47)  POCT Blood Glucose.: 112 mg/dL (21 Mar 2021 21:38)  POCT Blood Glucose.: 128 mg/dL (21 Mar 2021 16:43)

## 2021-03-22 NOTE — PROGRESS NOTE ADULT - ASSESSMENT
56M with PMHx of HTN (non-compliant with medications) who presented to [facility] on 03/07/2021 with HA and imbalance s/p fall on 3/6, admission SBP >220, found to have right thalamic hemorrhage with intraventricular extension.  Admitted for multidisciplinary rehab program- pt/ot/dvt ppx    #Right thalamic hemorrhage with intraventricular extension  - suspect secondary to uncontrolled HTN, per chart patient non complaint with meds  - s/p cerebral angiography-->no evidence of AVM; last CT on 3/9 was stable  - per neuro, MRI in 4-6 weeks to r/o vascular malformations  - statin     #HTN  #Hyperkalemia suspect possibly due to ARB  - K 5.5  - Clonidine, Nifedipine, and Losartan   - decrease losartan from 100mg to 50mg and add hydralazine 10mg q8  - Hctz discontinued due to hyponatremia and hypokalemia  - BP controlled  - goal < 140/90    #DM2- uncontrolled  - A1C 8.7% on3/8/21  - was not taking any meds for DM at home  - increase metformin to 1000mg BID and reduce lantus to 14u qHS  - FS well controlled now    # Hyponatremia - resolved. possibly SIADH   - hctz stopped  - C/W salt tabs for now, 1.2L fluid restriction   - urine Na: 26 urine Osmo: 821 (3/16)  - bmp tomorrow AM    # Constipation  - add senna, miralax     #HLD  - c/w Atorvastatin    #LBP  - CT lumbar spine negative for acute pathology  - lidocaine patch trial 3/19/21  - consider medrol dose pack if lidocaine unsuccessful    #Smoking history  - c/w Nicotine patch daily  - advised cessation    #insomnia- Melatonin PRN    #DVT prophylaxis- Lovenox      56M with PMHx of HTN (non-compliant with medications) who presented to [facility] on 03/07/2021 with HA and imbalance s/p fall on 3/6, admission SBP >220, found to have right thalamic hemorrhage with intraventricular extension.  Admitted for multidisciplinary rehab program- pt/ot/dvt ppx    #Right thalamic hemorrhage with intraventricular extension  - suspect secondary to uncontrolled HTN, per chart patient non complaint with meds  - s/p cerebral angiography-->no evidence of AVM; last CT on 3/9 was stable  - per neuro, MRI in 4-6 weeks to r/o vascular malformations  - statin     #HTN  #Hyperkalemia suspect possibly due to ARB  - K 5.5  - Clonidine, Nifedipine, and Losartan   - decrease losartan from 100mg to 50mg and add hydralazine 10mg q8  - start taper down clonidine to 0.2mg q8  - Hctz discontinued due to hyponatremia and hypokalemia  - BP controlled  - goal < 140/90    #DM2- uncontrolled  - A1C 8.7% on3/8/21  - was not taking any meds for DM at home  - increase metformin to 1000mg BID and reduce lantus to 14u qHS  - FS well controlled now    # Hyponatremia - resolved. possibly SIADH   - hctz stopped  - C/W salt tabs for now, 1.2L fluid restriction   - urine Na: 26 urine Osmo: 821 (3/16)  - bmp tomorrow AM    # Constipation  - add senna, miralax     #HLD  - c/w Atorvastatin    #LBP  - CT lumbar spine negative for acute pathology  - lidocaine patch trial 3/19/21  - consider medrol dose pack if lidocaine unsuccessful    #Smoking history  - c/w Nicotine patch daily  - advised cessation    #insomnia- Melatonin PRN    #DVT prophylaxis- Lovenox

## 2021-03-22 NOTE — PROGRESS NOTE ADULT - ASSESSMENT
PENNIE GIANG is a 56M with PMHx of HTN (non-compliant with medications) who presented to [facility] on 03/07/2021 with HA and imbalance s/p fall on 3/6, admission SBP >220, found to have right thalamic hemorrhage with intraventricular extension. Admitted for multidisciplinary rehab program.      #Comprehensive Multidisciplinary Rehab Program:- PT/OT/ SLP 3 hours a day 5 days a week    #Right thalamic hemorrhage with intraventricular extension:- likely 2/2 uncontrolled HTN  - s/p cerebral angiography-->no evidence of AVM; last CT on 3/9 was stable  - per neuro, MRI in 4-6 weeks to r/o vascular malformations  - BP control, as below    #LBP:- Improved  c/w Gabapentin to 300mg qhs  - tramadol prn   -  Lidocaine patch daily with Lidocaine gel for low back at night    #Hyponatremia: improved Most likely SIADH - reduce salt tabs to once daily   Continue fluid restriction     Labs with hyperkalemia: repeat this noon      HTN:- c/w Clonidine 0.3mg TID, HCTZ 25mg daily, Nifedipine 90mg daily, and Losartan 100mg daily    HLD:- c/w Atorvastatin 80mg qhs    Smoking history:- c/w Nicotine patch daily    #Sleep:- Melatonin PRN    #GI/Bowel:- Senna 2 tabs qhs and Miralax daily    #/Bladder:- Toileting schedule q4h    #Diet :- Diet: DASH/TLC and carb consistent  - Nutrition to follow    #Skin/ Pressure Injury Prevention: Turn Q2hrs in bed while awake, OOB to Chair, PT/OT    #DVT prophylaxis:  - Lovenox 40mg daily  - SCDs    Fever:-at admission, - s/p J&J vaccine prior to admission; ID w/u neg  #Precautions/ Restrictions    - Falls    #Dispo: IDR 03/16/2021  -- TDD: 3/27 to home  Progressing well in therapy  PENNIE GIANG is a 56M with PMHx of HTN (non-compliant with medications) who presented to [facility] on 03/07/2021 with HA and imbalance s/p fall on 3/6, admission SBP >220, found to have right thalamic hemorrhage with intraventricular extension. Admitted for multidisciplinary rehab program.      #Comprehensive Multidisciplinary Rehab Program:- PT/OT/ SLP 3 hours a day 5 days a week    #Right thalamic hemorrhage with intraventricular extension:- likely 2/2 uncontrolled HTN  - s/p cerebral angiography-->no evidence of AVM; last CT on 3/9 was stable  - per neuro, MRI in 4-6 weeks to r/o vascular malformations  - BP control, as below    #LBP:- Improved  c/w Gabapentin to 300mg qhs  - tramadol prn   -  Lidocaine patch daily with Lidocaine gel for low back at night    #Hyponatremia: improved Most likely SIADH - reduce salt tabs to once daily   Continue fluid restriction     Labs with hyperkalemia: repeat this noon      HTN:- c/w Clonidine 0.3mg TID, HCTZ 25mg daily, Nifedipine 90mg daily, and Losartan 100mg daily    HLD:- c/w Atorvastatin 80mg qhs    Smoking history:- c/w Nicotine patch daily    #Sleep:- Melatonin PRN    #GI/Bowel:- meds held due to diarrhea last week. restart senna    #/Bladder:- Toileting schedule q4h    #Diet :- Diet: DASH/TLC and carb consistent  - Nutrition to follow    #Skin/ Pressure Injury Prevention: Turn Q2hrs in bed while awake, OOB to Chair, PT/OT    #DVT prophylaxis:  - Lovenox 40mg daily  - SCDs    Fever:-at admission, - s/p J&J vaccine prior to admission; ID w/u neg  #Precautions/ Restrictions    - Falls    #Dispo: IDR 03/16/2021  -- TDD: 3/27 to home  Progressing well in therapy  PENNIE GIANG is a 56M with PMHx of HTN (non-compliant with medications) who presented to [facility] on 03/07/2021 with HA and imbalance s/p fall on 3/6, admission SBP >220, found to have right thalamic hemorrhage with intraventricular extension. Admitted for multidisciplinary rehab program.      #Comprehensive Multidisciplinary Rehab Program:- PT/OT/ SLP 3 hours a day 5 days a week    #Right thalamic hemorrhage with intraventricular extension:- likely 2/2 uncontrolled HTN  - s/p cerebral angiography-->no evidence of AVM; last CT on 3/9 was stable  - per neuro, MRI in 4-6 weeks to r/o vascular malformations  - BP control, as below    #LBP:- Improved  c/w Gabapentin to 300mg qhs  - tramadol prn   -  Lidocaine patch daily with Lidocaine gel for low back at night    #Hyponatremia: improved Most likely SIADH - reduce salt tabs to once daily   Continue fluid restriction     Labs with hyperkalemia: repeat this noon      HTN:- c/w Clonidine 0.3mg TID, Nifedipine 90mg daily, and Losartan dose reduced 50mg daily- for hyperkalemia. Hydralazine 10mg TID ordered per hospitalist - will reduce clonidine to 0.2mg tid     HLD:- c/w Atorvastatin 80mg qhs    Smoking history:- c/w Nicotine patch daily    #Sleep:- Melatonin PRN    #GI/Bowel:- meds held due to diarrhea last week. restart senna    #/Bladder:- Toileting schedule q4h    #Diet :- Diet: DASH/TLC and carb consistent  - Nutrition to follow    #Skin/ Pressure Injury Prevention: Turn Q2hrs in bed while awake, OOB to Chair, PT/OT    #DVT prophylaxis:  - Lovenox 40mg daily  - SCDs    Fever:-at admission, - s/p J&J vaccine prior to admission; ID w/u neg  #Precautions/ Restrictions    - Falls    #Dispo: IDR 03/16/2021  -- TDD: 3/27 to home  Progressing well in therapy

## 2021-03-22 NOTE — PROGRESS NOTE ADULT - SUBJECTIVE AND OBJECTIVE BOX
Patient is a 56y old  Male who presents with a chief complaint of 1.1 Right thalamic hemorrhage with IVH (21 Mar 2021 13:10)      Patient seen and examined at bedside. pt reports no complaints. reports back pain better today. constipation for three days. no abd pain, sob, cp    ALLERGIES:  No Known Allergies    MEDICATIONS  (STANDING):  atorvastatin 80 milliGRAM(s) Oral at bedtime  cloNIDine 0.3 milliGRAM(s) Oral three times a day  dextrose 40% Gel 15 Gram(s) Oral once  dextrose 5%. 1000 milliLiter(s) (50 mL/Hr) IV Continuous <Continuous>  dextrose 5%. 1000 milliLiter(s) (100 mL/Hr) IV Continuous <Continuous>  dextrose 50% Injectable 25 Gram(s) IV Push once  dextrose 50% Injectable 25 Gram(s) IV Push once  dextrose 50% Injectable 12.5 Gram(s) IV Push once  enoxaparin Injectable 40 milliGRAM(s) SubCutaneous <User Schedule>  folic acid 1 milliGRAM(s) Oral daily  gabapentin 300 milliGRAM(s) Oral at bedtime  glucagon  Injectable 1 milliGRAM(s) IntraMuscular once  influenza   Vaccine 0.5 milliLiter(s) IntraMuscular once  insulin glargine Injectable (LANTUS) 14 Unit(s) SubCutaneous at bedtime  insulin lispro (ADMELOG) corrective regimen sliding scale   SubCutaneous three times a day before meals  insulin lispro (ADMELOG) corrective regimen sliding scale   SubCutaneous at bedtime  lactobacillus acidophilus 1 Tablet(s) Oral daily  lidocaine   Patch 1 Patch Transdermal <User Schedule>  lidocaine 4% Topical Solution 1 Application(s) Topical <User Schedule>  melatonin 6 milliGRAM(s) Oral at bedtime  metFORMIN 1000 milliGRAM(s) Oral two times a day  multivitamin 1 Tablet(s) Oral daily  nicotine -  14 mG/24Hr(s) Patch 1 patch Transdermal daily  NIFEdipine XL 90 milliGRAM(s) Oral daily  sodium chloride 1 Gram(s) Oral two times a day  thiamine 100 milliGRAM(s) Oral daily    MEDICATIONS  (PRN):  aluminum hydroxide/magnesium hydroxide/simethicone Suspension 30 milliLiter(s) Oral every 4 hours PRN Dyspepsia  traMADol 25 milliGRAM(s) Oral every 6 hours PRN Moderate Pain (4 - 6)  traMADol 50 milliGRAM(s) Oral every 6 hours PRN Severe Pain (7 - 10)    Vital Signs Last 24 Hrs  T(F): 98.2 (22 Mar 2021 08:56), Max: 99.2 (21 Mar 2021 19:34)  HR: 77 (22 Mar 2021 08:56) (77 - 96)  BP: 117/77 (22 Mar 2021 08:56) (117/77 - 127/89)  RR: 16 (22 Mar 2021 08:56) (15 - 16)  SpO2: 95% (22 Mar 2021 08:56) (95% - 98%)  I&O's Summary    21 Mar 2021 07:01  -  22 Mar 2021 07:00  --------------------------------------------------------  IN: 800 mL / OUT: 0 mL / NET: 800 mL      PHYSICAL EXAM:  General: NAD, A/O x 3  ENT: MMM  Neck: Supple, No JVD  Lungs: Clear to auscultation bilaterally  Cardio: RRR, S1/S2, No murmurs  Abdomen: Soft, Nontender, Nondistended; Bowel sounds present  Extremities: No calf tenderness, No pitting edema    LABS:                        14.0   6.14  )-----------( 273      ( 22 Mar 2021 05:00 )             41.5     03-22    136  |  103  |  19  ----------------------------<  151  5.5   |  31  |  0.99    Ca    9.1      22 Mar 2021 05:00      eGFR if Non African American: 85 mL/min/1.73M2 (03-22-21 @ 05:00)  eGFR if : 99 mL/min/1.73M2 (03-22-21 @ 05:00)            03-08 Chol 234 mg/dL LDL -- HDL 54 mg/dL Trig 200 mg/dL              POCT Blood Glucose.: 122 mg/dL (22 Mar 2021 11:22)  POCT Blood Glucose.: 132 mg/dL (22 Mar 2021 07:47)  POCT Blood Glucose.: 112 mg/dL (21 Mar 2021 21:38)  POCT Blood Glucose.: 128 mg/dL (21 Mar 2021 16:43)  POCT Blood Glucose.: 140 mg/dL (21 Mar 2021 11:37)            RADIOLOGY & ADDITIONAL TESTS:    Care Discussed with Consultants/Other Providers: rehab

## 2021-03-23 LAB
ANION GAP SERPL CALC-SCNC: 6 MMOL/L — SIGNIFICANT CHANGE UP (ref 5–17)
BUN SERPL-MCNC: 16 MG/DL — SIGNIFICANT CHANGE UP (ref 7–23)
CALCIUM SERPL-MCNC: 9.6 MG/DL — SIGNIFICANT CHANGE UP (ref 8.4–10.5)
CHLORIDE SERPL-SCNC: 101 MMOL/L — SIGNIFICANT CHANGE UP (ref 96–108)
CO2 SERPL-SCNC: 30 MMOL/L — SIGNIFICANT CHANGE UP (ref 22–31)
CREAT SERPL-MCNC: 0.87 MG/DL — SIGNIFICANT CHANGE UP (ref 0.5–1.3)
GLUCOSE BLDC GLUCOMTR-MCNC: 100 MG/DL — HIGH (ref 70–99)
GLUCOSE BLDC GLUCOMTR-MCNC: 123 MG/DL — HIGH (ref 70–99)
GLUCOSE BLDC GLUCOMTR-MCNC: 124 MG/DL — HIGH (ref 70–99)
GLUCOSE BLDC GLUCOMTR-MCNC: 97 MG/DL — SIGNIFICANT CHANGE UP (ref 70–99)
GLUCOSE SERPL-MCNC: 105 MG/DL — HIGH (ref 70–99)
POTASSIUM SERPL-MCNC: 5 MMOL/L — SIGNIFICANT CHANGE UP (ref 3.5–5.3)
POTASSIUM SERPL-SCNC: 5 MMOL/L — SIGNIFICANT CHANGE UP (ref 3.5–5.3)
SODIUM SERPL-SCNC: 137 MMOL/L — SIGNIFICANT CHANGE UP (ref 135–145)

## 2021-03-23 PROCEDURE — 99232 SBSQ HOSP IP/OBS MODERATE 35: CPT

## 2021-03-23 PROCEDURE — 99233 SBSQ HOSP IP/OBS HIGH 50: CPT

## 2021-03-23 RX ORDER — INSULIN GLARGINE 100 [IU]/ML
10 INJECTION, SOLUTION SUBCUTANEOUS AT BEDTIME
Refills: 0 | Status: DISCONTINUED | OUTPATIENT
Start: 2021-03-23 | End: 2021-03-24

## 2021-03-23 RX ORDER — POLYETHYLENE GLYCOL 3350 17 G/17G
17 POWDER, FOR SOLUTION ORAL
Refills: 0 | Status: DISCONTINUED | OUTPATIENT
Start: 2021-03-23 | End: 2021-03-31

## 2021-03-23 RX ORDER — MAGNESIUM HYDROXIDE 400 MG/1
30 TABLET, CHEWABLE ORAL ONCE
Refills: 0 | Status: COMPLETED | OUTPATIENT
Start: 2021-03-23 | End: 2021-03-23

## 2021-03-23 RX ADMIN — POLYETHYLENE GLYCOL 3350 17 GRAM(S): 17 POWDER, FOR SOLUTION ORAL at 11:41

## 2021-03-23 RX ADMIN — Medication 0.2 MILLIGRAM(S): at 22:10

## 2021-03-23 RX ADMIN — Medication 1 PATCH: at 12:00

## 2021-03-23 RX ADMIN — Medication 10 MILLIGRAM(S): at 05:26

## 2021-03-23 RX ADMIN — SODIUM CHLORIDE 1 GRAM(S): 9 INJECTION INTRAMUSCULAR; INTRAVENOUS; SUBCUTANEOUS at 11:42

## 2021-03-23 RX ADMIN — LIDOCAINE 1 PATCH: 4 CREAM TOPICAL at 07:57

## 2021-03-23 RX ADMIN — ENOXAPARIN SODIUM 40 MILLIGRAM(S): 100 INJECTION SUBCUTANEOUS at 17:52

## 2021-03-23 RX ADMIN — METFORMIN HYDROCHLORIDE 1000 MILLIGRAM(S): 850 TABLET ORAL at 17:52

## 2021-03-23 RX ADMIN — Medication 1 PATCH: at 11:41

## 2021-03-23 RX ADMIN — Medication 1 MILLIGRAM(S): at 11:41

## 2021-03-23 RX ADMIN — GABAPENTIN 300 MILLIGRAM(S): 400 CAPSULE ORAL at 22:10

## 2021-03-23 RX ADMIN — LOSARTAN POTASSIUM 50 MILLIGRAM(S): 100 TABLET, FILM COATED ORAL at 05:26

## 2021-03-23 RX ADMIN — Medication 0.2 MILLIGRAM(S): at 13:32

## 2021-03-23 RX ADMIN — MAGNESIUM HYDROXIDE 30 MILLILITER(S): 400 TABLET, CHEWABLE ORAL at 12:31

## 2021-03-23 RX ADMIN — Medication 100 MILLIGRAM(S): at 11:42

## 2021-03-23 RX ADMIN — LIDOCAINE 1 PATCH: 4 CREAM TOPICAL at 19:39

## 2021-03-23 RX ADMIN — METFORMIN HYDROCHLORIDE 1000 MILLIGRAM(S): 850 TABLET ORAL at 07:57

## 2021-03-23 RX ADMIN — Medication 1 TABLET(S): at 11:41

## 2021-03-23 RX ADMIN — Medication 1 PATCH: at 19:38

## 2021-03-23 RX ADMIN — Medication 6 MILLIGRAM(S): at 22:10

## 2021-03-23 RX ADMIN — Medication 90 MILLIGRAM(S): at 05:27

## 2021-03-23 RX ADMIN — SENNA PLUS 2 TABLET(S): 8.6 TABLET ORAL at 22:10

## 2021-03-23 RX ADMIN — ATORVASTATIN CALCIUM 80 MILLIGRAM(S): 80 TABLET, FILM COATED ORAL at 22:10

## 2021-03-23 RX ADMIN — Medication 1 PATCH: at 06:28

## 2021-03-23 RX ADMIN — INSULIN GLARGINE 10 UNIT(S): 100 INJECTION, SOLUTION SUBCUTANEOUS at 22:09

## 2021-03-23 RX ADMIN — Medication 0.2 MILLIGRAM(S): at 05:26

## 2021-03-23 NOTE — PROGRESS NOTE ADULT - SUBJECTIVE AND OBJECTIVE BOX
DAILY PROGRESS NOTE:  HPI:  PENNIE GIAGN is a 56M with PMHx of HTN (non-compliant with medications) who presented to [facility] on 03/07/2021 with HA and imbalance s/p fall on 3/6. SBP elevated on admisison >220, thus started on nicardipine gtt; CT head findings c/w right thalamic hemorrhage with intraventricular hemorrhage. Cerebral angiography on 3/8 did not show evidence of AVM. Nicardipine weaned off and started on Clonidine, HCTZ, Nifedipine, and Losartan. Repeat CT head on 3/9 with stable findings. Plan for MRI brain in 4-6 weeks to rule out underlying vascular malformation.    Patient was evaluated by PM&R and therapy for functional deficits and gait/ ADL impairments and recommended acute rehabilitation. Patient was medically optimized for discharge to [facility] on 03/12/2021.    On admission, patient noted to have temp 99.6F oral, 101F rectal. Patient denies feeling febrile, chills, SOB, dysuria, abd pain. COVID-19 PCR, UA and CXR ordered.  (13 Mar 2021 12:23)      Subjective:  Patient was seen and examined at the bedside this am  Back pain resolved, but now with constipation  Denies nausea  Denies abdominal pain     ROS:  Denied fever, chills, nausea, vomiting, CP, palpitations, coughing, dyspnea  No BM since 3/18       Physical Exam:    Vital Signs Last 24 Hrs  T(C): 37.1 (23 Mar 2021 07:23), Max: 37.1 (23 Mar 2021 07:23)  T(F): 98.7 (23 Mar 2021 07:23), Max: 98.7 (23 Mar 2021 07:23)  HR: 76 (23 Mar 2021 07:23) (74 - 96)  BP: 113/81 (23 Mar 2021 07:23) (113/81 - 130/88)  BP(mean): --  RR: 16 (23 Mar 2021 07:23) (16 - 16)  SpO2: 98% (23 Mar 2021 07:23) (98% - 98%)      Gen - NAD, Comfortable  Pulm - CTAB,  Cardiovascular - S1S2  Abdomen - Soft, NT/ND, +BS  Extremities - no edema or calf tenderness  Neuro-     Cognitive - awake and alert     Communication - Fluent, No dysarthria     Cranial Nerves - no facial asymmetry     Motor - 5/5      Psychiatric - Mood stable, Affect WNL      MEDICATIONS  (STANDING):  atorvastatin 80 milliGRAM(s) Oral at bedtime  cloNIDine 0.2 milliGRAM(s) Oral three times a day  dextrose 40% Gel 15 Gram(s) Oral once  dextrose 5%. 1000 milliLiter(s) (50 mL/Hr) IV Continuous <Continuous>  dextrose 5%. 1000 milliLiter(s) (100 mL/Hr) IV Continuous <Continuous>  dextrose 50% Injectable 25 Gram(s) IV Push once  dextrose 50% Injectable 25 Gram(s) IV Push once  dextrose 50% Injectable 12.5 Gram(s) IV Push once  enoxaparin Injectable 40 milliGRAM(s) SubCutaneous <User Schedule>  folic acid 1 milliGRAM(s) Oral daily  gabapentin 300 milliGRAM(s) Oral at bedtime  glucagon  Injectable 1 milliGRAM(s) IntraMuscular once  hydrALAZINE 10 milliGRAM(s) Oral three times a day  influenza   Vaccine 0.5 milliLiter(s) IntraMuscular once  insulin glargine Injectable (LANTUS) 10 Unit(s) SubCutaneous at bedtime  insulin lispro (ADMELOG) corrective regimen sliding scale   SubCutaneous three times a day before meals  insulin lispro (ADMELOG) corrective regimen sliding scale   SubCutaneous at bedtime  lactobacillus acidophilus 1 Tablet(s) Oral daily  lidocaine   Patch 1 Patch Transdermal <User Schedule>  lidocaine 4% Topical Solution 1 Application(s) Topical <User Schedule>  losartan 50 milliGRAM(s) Oral daily  magnesium hydroxide Suspension 30 milliLiter(s) Oral once  melatonin 6 milliGRAM(s) Oral at bedtime  metFORMIN 1000 milliGRAM(s) Oral two times a day  multivitamin 1 Tablet(s) Oral daily  nicotine -  14 mG/24Hr(s) Patch 1 patch Transdermal daily  NIFEdipine XL 90 milliGRAM(s) Oral daily  polyethylene glycol 3350 17 Gram(s) Oral daily  senna 2 Tablet(s) Oral at bedtime  sodium chloride 1 Gram(s) Oral daily  thiamine 100 milliGRAM(s) Oral daily    MEDICATIONS  (PRN):  aluminum hydroxide/magnesium hydroxide/simethicone Suspension 30 milliLiter(s) Oral every 4 hours PRN Dyspepsia  bisacodyl 5 milliGRAM(s) Oral every 12 hours PRN Constipation  traMADol 25 milliGRAM(s) Oral every 6 hours PRN Moderate Pain (4 - 6)  traMADol 50 milliGRAM(s) Oral every 6 hours PRN Severe Pain (7 - 10)      03-23    137  |  101  |  16  ----------------------------<  105<H>  5.0   |  30  |  0.87    Ca    9.6      23 Mar 2021 05:50                              14.0   6.14  )-----------( 273      ( 22 Mar 2021 05:00 )             41.5     03-22    136  |  103  |  19  ----------------------------<  151<H>  5.5<H>   |  31  |  0.99    Ca    9.1      22 Mar 2021 05:00    CAPILLARY BLOOD GLUCOSE      POCT Blood Glucose.: 124 mg/dL (23 Mar 2021 11:01)  POCT Blood Glucose.: 123 mg/dL (23 Mar 2021 07:54)  POCT Blood Glucose.: 123 mg/dL (22 Mar 2021 22:12)  POCT Blood Glucose.: 132 mg/dL (22 Mar 2021 16:44)

## 2021-03-23 NOTE — PROGRESS NOTE ADULT - ASSESSMENT
56M with PMHx of HTN (non-compliant with medications) who presented to [facility] on 03/07/2021 with HA and imbalance s/p fall on 3/6, admission SBP >220, found to have right thalamic hemorrhage with intraventricular extension.  Admitted for multidisciplinary rehab program- pt/ot/dvt ppx    #Right thalamic hemorrhage with intraventricular extension  - likely 2/2 uncontrolled HTN, per chart patient non complaint with meds.  - s/p cerebral angiography-->no evidence of AVM; last CT on 3/9 was stable  - per neuro, MRI in 4-6 weeks to r/o vascular malformations  - BP control, as below    #DM2- uncontrolled  - iss, Accu-Cheks Lantus diabetic diet  - will decrease Lantus to 10 today,  AND c/w METFORMIN 1gm bid  - was not taking any meds for DM at home  - A1C with Estimated Average Glucose Result: 8.7% (03.08.21 @ 05:08)    # hyponatremia likely SIADH- improved, monitor - stop hctz, C/W salt tabs for now, fluid restriction,  reassess    # hypokalemia monitor, replete, check lytes in am , stop hctz    #HTN- placed on multiple antihypertensives, will monitor and adjust  as needed  - c/w Clonidine, Nifedipine, and Losartan, hydralazine  -  hctz was d/cd due to hyponatremia and hypokalemia  - mag normal 3/15/21    #HLD  - c/w Atorvastatin    #LBP  - CT lumbar spine negative for acute pathology    #Smoking history  - c/w Nicotine patch daily  - advised cessation    #insomnia- Melatonin PRN    #DVT prophylaxis- Lovenox     will follow  d/w dr. ness 56M with PMHx of HTN (non-compliant with medications) who presented to [facility] on 03/07/2021 with HA and imbalance s/p fall on 3/6, admission SBP >220, found to have right thalamic hemorrhage with intraventricular extension.  Admitted for multidisciplinary rehab program- pt/ot/dvt ppx    #Right thalamic hemorrhage with intraventricular extension  - likely 2/2 uncontrolled HTN, per chart patient non complaint with meds.  - s/p cerebral angiography-->no evidence of AVM; last CT on 3/9 was stable  - per neuro, MRI in 4-6 weeks to r/o vascular malformations  - BP control, as below    #DM2- uncontrolled  - iss, Accu-Cheks Lantus diabetic diet  - will decrease Lantus to 10 today,  AND c/w METFORMIN 1gm bid  - was not taking any meds for DM at home  - A1C with Estimated Average Glucose Result: 8.7% (03.08.21 @ 05:08)    # hyponatremia likely SIADH- improved, monitor - stop hctz, C/W salt tabs for now, fluid restriction,  reassess    # hypokalemia monitor, replete, check lytes in am , stop hctz    #HTN- placed on multiple antihypertensives, will monitor and adjust  as needed  - c/w Clonidine, Nifedipine, and Losartan was decreased due to hyperkalemia 3/22/21  -  hctz was d/cd due to hyponatremia and hypokalemia  - mag normal 3/15/21    #HLD  - c/w Atorvastatin    #LBP  - CT lumbar spine negative for acute pathology    #Smoking history  - c/w Nicotine patch daily  - advised cessation    #insomnia- Melatonin PRN    #DVT prophylaxis- Lovenox     will follow  d/w dr. ness

## 2021-03-23 NOTE — PROGRESS NOTE ADULT - ASSESSMENT
PENNIE GIANG is a 56M with PMHx of HTN (non-compliant with medications) who presented to [facility] on 03/07/2021 with HA and imbalance s/p fall on 3/6, admission SBP >220, found to have right thalamic hemorrhage with intraventricular extension. Admitted for multidisciplinary rehab program.      #Comprehensive Multidisciplinary Rehab Program:- PT/OT/ SLP 3 hours a day 5 days a week    #Right thalamic hemorrhage with intraventricular extension:- likely 2/2 uncontrolled HTN  - s/p cerebral angiography-->no evidence of AVM; last CT on 3/9 was stable  - per neuro, MRI in 4-6 weeks to r/o vascular malformations  - BP control, as below    #LBP:- Improved  c/w Gabapentin to 300mg qhs  - tramadol prn   -  Lidocaine patch daily with Lidocaine gel for low back at night    #Hyponatremia: improved Most likely SIADH - reduce salt tabs to once daily   Continue fluid restriction     Labs with hyperkalemia: Hyperkalemia improved on report labs      HTN:- c/w Clonidine 0.2mg TID,( dose reduced 3/22)  Nifedipine 90mg daily, and Losartan 50mg daily,  Hydralazine 10mg TID     HLD:- c/w Atorvastatin 80mg qhs    Smoking history:- c/w Nicotine patch daily    #Sleep:- Melatonin PRN    #GI/Bowel:on senna and miralax. Add dulcolax . MOM today if no BM . Increase miralax to bid     #/Bladder:- Toileting schedule q4h    #Diet :- Diet: DASH/TLC and carb consistent  - Nutrition to follow    #Skin/ Pressure Injury Prevention: Turn Q2hrs in bed while awake, OOB to Chair, PT/OT    #DVT prophylaxis:  - Lovenox 40mg daily  - SCDs    Fever:-at admission, - s/p J&J vaccine prior to admission; ID w/u neg  #Precautions/ Restrictions    - Falls    #Dispo: Team conference 3/23:  Progress limited last week due to back pain. Would benefit from additional days of rehab   -- TDD: changed to 3/31 based on insurance auth   Family training to be coordinated

## 2021-03-23 NOTE — PROGRESS NOTE ADULT - SUBJECTIVE AND OBJECTIVE BOX
56 y o M with PMHx of HTN (non-compliant with medications) who presented to [facility] on 03/07/2021 with HA and imbalance s/p fall on 3/6. SBP elevated on admission >220,  CT head findings revealed right thalamic hemorrhage with intraventricular hemorrhage.   received j/j vaccine on 3/12/21    seen at the bedside, no new complaints, states back and RLE pain has resolved,  no n/v, no sob,   afebrile, no dysuria      Vital Signs Last 24 Hrs  T(C): 37.1 (23 Mar 2021 07:23), Max: 37.1 (23 Mar 2021 07:23)  T(F): 98.7 (23 Mar 2021 07:23), Max: 98.7 (23 Mar 2021 07:23)  HR: 76 (23 Mar 2021 07:23) (74 - 96)  BP: 113/81 (23 Mar 2021 07:23) (113/81 - 130/88)  BP(mean): --  RR: 16 (23 Mar 2021 07:23) (16 - 16)  SpO2: 98% (23 Mar 2021 07:23) (98% - 98%)      GENERAL- NAD  EAR/NOSE/MOUTH/THROAT - no pharyngeal exudates, no oral lesions  MMM  EYES- MAURY, conjunctiva and Sclera clear  NECK- supple  RESPIRATORY-  clear to auscultation bilaterally  CARDIOVASCULAR - SIS2, RRR  GI - soft NT BS present  EXTREMITIES- no pedal edema  NEUROLOGY- no gross focal deficits, no facial asymmetry.   SKIN- no rashes, warm to touch  PSYCHIATRY- AAO X 3  MUSCULOSKELETAL- ROM normal                x                    137  | 30   | 16           x     >-----------< x       ------------------------< 105                   x                    5.0  | 101  | 0.87                                         Ca 9.6   Mg x     Ph x        CAPILLARY BLOOD GLUCOSE      POCT Blood Glucose.: 124 mg/dL (23 Mar 2021 11:01)  POCT Blood Glucose.: 123 mg/dL (23 Mar 2021 07:54)  POCT Blood Glucose.: 123 mg/dL (22 Mar 2021 22:12)  POCT Blood Glucose.: 132 mg/dL (22 Mar 2021 16:44)      MEDICATIONS  (STANDING):  atorvastatin 80 milliGRAM(s) Oral at bedtime  cloNIDine 0.2 milliGRAM(s) Oral three times a day  enoxaparin Injectable 40 milliGRAM(s) SubCutaneous <User Schedule>  folic acid 1 milliGRAM(s) Oral daily  gabapentin 300 milliGRAM(s) Oral at bedtime  glucagon  Injectable 1 milliGRAM(s) IntraMuscular once  hydrALAZINE 10 milliGRAM(s) Oral three times a day  influenza   Vaccine 0.5 milliLiter(s) IntraMuscular once  insulin glargine Injectable (LANTUS) 14 Unit(s) SubCutaneous at bedtime  insulin lispro (ADMELOG) corrective regimen sliding scale   SubCutaneous three times a day before meals  insulin lispro (ADMELOG) corrective regimen sliding scale   SubCutaneous at bedtime  lactobacillus acidophilus 1 Tablet(s) Oral daily  lidocaine   Patch 1 Patch Transdermal <User Schedule>  lidocaine 4% Topical Solution 1 Application(s) Topical <User Schedule>  losartan 50 milliGRAM(s) Oral daily  melatonin 6 milliGRAM(s) Oral at bedtime  metFORMIN 1000 milliGRAM(s) Oral two times a day  multivitamin 1 Tablet(s) Oral daily  nicotine -  14 mG/24Hr(s) Patch 1 patch Transdermal daily  NIFEdipine XL 90 milliGRAM(s) Oral daily  polyethylene glycol 3350 17 Gram(s) Oral daily  senna 2 Tablet(s) Oral at bedtime  sodium chloride 1 Gram(s) Oral daily  thiamine 100 milliGRAM(s) Oral daily    MEDICATIONS  (PRN):  aluminum hydroxide/magnesium hydroxide/simethicone Suspension 30 milliLiter(s) Oral every 4 hours PRN Dyspepsia  traMADol 25 milliGRAM(s) Oral every 6 hours PRN Moderate Pain (4 - 6)  traMADol 50 milliGRAM(s) Oral every 6 hours PRN Severe Pain (7 - 10)

## 2021-03-24 LAB
GLUCOSE BLDC GLUCOMTR-MCNC: 110 MG/DL — HIGH (ref 70–99)
GLUCOSE BLDC GLUCOMTR-MCNC: 113 MG/DL — HIGH (ref 70–99)
GLUCOSE BLDC GLUCOMTR-MCNC: 114 MG/DL — HIGH (ref 70–99)
GLUCOSE BLDC GLUCOMTR-MCNC: 118 MG/DL — HIGH (ref 70–99)

## 2021-03-24 PROCEDURE — 99233 SBSQ HOSP IP/OBS HIGH 50: CPT

## 2021-03-24 PROCEDURE — 99232 SBSQ HOSP IP/OBS MODERATE 35: CPT

## 2021-03-24 RX ORDER — INSULIN LISPRO 100/ML
VIAL (ML) SUBCUTANEOUS
Refills: 0 | Status: DISCONTINUED | OUTPATIENT
Start: 2021-03-24 | End: 2021-03-31

## 2021-03-24 RX ORDER — NIFEDIPINE 30 MG
30 TABLET, EXTENDED RELEASE 24 HR ORAL
Refills: 0 | Status: DISCONTINUED | OUTPATIENT
Start: 2021-03-24 | End: 2021-03-24

## 2021-03-24 RX ORDER — LOSARTAN POTASSIUM 100 MG/1
50 TABLET, FILM COATED ORAL DAILY
Refills: 0 | Status: DISCONTINUED | OUTPATIENT
Start: 2021-03-25 | End: 2021-03-31

## 2021-03-24 RX ORDER — NIFEDIPINE 30 MG
30 TABLET, EXTENDED RELEASE 24 HR ORAL
Refills: 0 | Status: DISCONTINUED | OUTPATIENT
Start: 2021-03-25 | End: 2021-03-31

## 2021-03-24 RX ADMIN — Medication 0.1 MILLIGRAM(S): at 17:15

## 2021-03-24 RX ADMIN — Medication 6 MILLIGRAM(S): at 22:08

## 2021-03-24 RX ADMIN — GABAPENTIN 300 MILLIGRAM(S): 400 CAPSULE ORAL at 22:08

## 2021-03-24 RX ADMIN — LIDOCAINE 1 APPLICATION(S): 4 CREAM TOPICAL at 22:09

## 2021-03-24 RX ADMIN — Medication 1 TABLET(S): at 11:50

## 2021-03-24 RX ADMIN — Medication 1 PATCH: at 11:49

## 2021-03-24 RX ADMIN — LOSARTAN POTASSIUM 50 MILLIGRAM(S): 100 TABLET, FILM COATED ORAL at 06:07

## 2021-03-24 RX ADMIN — LIDOCAINE 1 PATCH: 4 CREAM TOPICAL at 20:54

## 2021-03-24 RX ADMIN — LIDOCAINE 1 PATCH: 4 CREAM TOPICAL at 07:50

## 2021-03-24 RX ADMIN — METFORMIN HYDROCHLORIDE 1000 MILLIGRAM(S): 850 TABLET ORAL at 07:51

## 2021-03-24 RX ADMIN — Medication 1 PATCH: at 22:11

## 2021-03-24 RX ADMIN — Medication 1 PATCH: at 06:08

## 2021-03-24 RX ADMIN — ATORVASTATIN CALCIUM 80 MILLIGRAM(S): 80 TABLET, FILM COATED ORAL at 22:08

## 2021-03-24 RX ADMIN — Medication 100 MILLIGRAM(S): at 11:51

## 2021-03-24 RX ADMIN — ENOXAPARIN SODIUM 40 MILLIGRAM(S): 100 INJECTION SUBCUTANEOUS at 17:16

## 2021-03-24 RX ADMIN — Medication 1 MILLIGRAM(S): at 11:51

## 2021-03-24 RX ADMIN — Medication 90 MILLIGRAM(S): at 06:07

## 2021-03-24 RX ADMIN — Medication 1 PATCH: at 11:48

## 2021-03-24 RX ADMIN — SENNA PLUS 2 TABLET(S): 8.6 TABLET ORAL at 22:08

## 2021-03-24 RX ADMIN — SODIUM CHLORIDE 1 GRAM(S): 9 INJECTION INTRAMUSCULAR; INTRAVENOUS; SUBCUTANEOUS at 11:51

## 2021-03-24 RX ADMIN — METFORMIN HYDROCHLORIDE 1000 MILLIGRAM(S): 850 TABLET ORAL at 17:14

## 2021-03-24 RX ADMIN — LIDOCAINE 1 PATCH: 4 CREAM TOPICAL at 19:21

## 2021-03-24 RX ADMIN — Medication 0.2 MILLIGRAM(S): at 06:07

## 2021-03-24 RX ADMIN — POLYETHYLENE GLYCOL 3350 17 GRAM(S): 17 POWDER, FOR SOLUTION ORAL at 17:13

## 2021-03-24 NOTE — PROGRESS NOTE ADULT - ASSESSMENT
56M with PMHx of HTN (non-compliant with medications) who presented to [facility] on 03/07/2021 with HA and imbalance s/p fall on 3/6, admission SBP >220, found to have right thalamic hemorrhage with intraventricular extension.  Admitted for multidisciplinary rehab program- pt/ot/dvt ppx    #Right thalamic hemorrhage with intraventricular extension  - likely 2/2 uncontrolled HTN, per chart patient non complaint with meds.  - s/p cerebral angiography-->no evidence of AVM; last CT on 3/9 was stable  - per neuro, MRI in 4-6 weeks to r/o vascular malformations      #DM2- Accu-Cheks noted - low, will d/c lantus, c/w metformin, reassess in am  - A1C with Estimated Average Glucose Result: 8.7% (03.08.21 @ 05:08)    # hyponatremia likely SIADH- improved, monitor - stop hctz, C/W salt tabs, fluid restriction,  reassess    # hypokalemia monitor, replete prn,  hctz was d/c    #HTN- noted low this am  - decreased Clonidine and  Nifedipine, c/w Losartan   -  hctz was d/cd due to hyponatremia and hypokalemia  - mag normal 3/15/21    #HLD  - c/w Atorvastatin    #LBP  - CT lumbar spine negative for acute pathology    #Smoking history  - c/w Nicotine patch daily  - advised cessation    #insomnia- Melatonin PRN    #DVT prophylaxis- Lovenox     will follow  d/w dr. ness

## 2021-03-24 NOTE — PROGRESS NOTE ADULT - ASSESSMENT
PENNIE GIANG is a 56M with PMHx of HTN (non-compliant with medications) who presented to [facility] on 03/07/2021 with HA and imbalance s/p fall on 3/6, admission SBP >220, found to have right thalamic hemorrhage with intraventricular extension. Admitted for multidisciplinary rehab program.      #Comprehensive Multidisciplinary Rehab Program:- PT/OT/ SLP 3 hours a day 5 days a week    #Right thalamic hemorrhage with intraventricular extension:- likely 2/2 uncontrolled HTN  - s/p cerebral angiography-->no evidence of AVM; last CT on 3/9 was stable  - per neuro, MRI in 4-6 weeks to r/o vascular malformations  - BP control, as below    #LBP:- Improved  c/w Gabapentin to 300mg qhs  - tramadol prn   -  Lidocaine patch daily with Lidocaine gel for low back at night    #Hyponatremia: improved Most likely SIADH - reduce salt tabs to once daily   Continue fluid restriction     Labs with hyperkalemia: Hyperkalemia improved on report labs      HTN:- c/w Clonidine 0.2mg TID,( dose reduced 3/22)  Nifedipine 90mg daily, and Losartan 50mg daily,  Hydralazine 10mg TID     HLD:- c/w Atorvastatin 80mg qhs    Smoking history:- c/w Nicotine patch daily    #Sleep:- Melatonin PRN    #GI/Bowel:on senna and miralax. Add dulcolax . MOM today if no BM . Increase miralax to bid     #/Bladder:- Toileting schedule q4h    #Diet :- Diet: DASH/TLC and carb consistent  - Nutrition to follow    #Skin/ Pressure Injury Prevention: Turn Q2hrs in bed while awake, OOB to Chair, PT/OT    #DVT prophylaxis:  - Lovenox 40mg daily  - SCDs    Fever:-at admission, - s/p J&J vaccine prior to admission; ID w/u neg  #Precautions/ Restrictions    - Falls    #Dispo: Team conference 3/23:  Progress limited last week due to back pain. Would benefit from additional days of rehab   -- TDD: changed to 3/31 based on insurance auth   Family training to be coordinated   PENNIE GIANG is a 56M with PMHx of HTN (non-compliant with medications) who presented to [facility] on 03/07/2021 with HA and imbalance s/p fall on 3/6, admission SBP >220, found to have right thalamic hemorrhage with intraventricular extension. Admitted for multidisciplinary rehab program.      #Comprehensive Multidisciplinary Rehab Program:- PT/OT/ SLP 3 hours a day 5 days a week    #Right thalamic hemorrhage with intraventricular extension:- likely 2/2 uncontrolled HTN  - s/p cerebral angiography-->no evidence of AVM; last CT on 3/9 was stable  - per neuro, MRI in 4-6 weeks to r/o vascular malformations  - BP control, as below    #LBP:- Improved  c/w Gabapentin to 300mg qhs  - tramadol prn   -  Lidocaine patch daily with Lidocaine gel for low back at night    #Hyponatremia: improved Most likely SIADH - reduce salt tabs to once daily   Continue fluid restriction     Labs with hyperkalemia: Hyperkalemia improved on report labs      HTN:- c/w Clonidine 0.2mg TID,( dose reduced 3/22)  Nifedipine 90mg daily, and Losartan 50mg daily,  Hydralazine 10mg TID     HLD:- c/w Atorvastatin 80mg qhs    Smoking history:- c/w Nicotine patch daily    #Sleep:- Melatonin PRN    #GI/Bowel:on senna and miralax. Add dulcolax . MOM today if no BM . Increase miralax to bid     #/Bladder:- Toileting schedule q4h    #Diet :- Diet: DASH/TLC and carb consistent  - Nutrition to follow    #Skin/ Pressure Injury Prevention: Turn Q2hrs in bed while awake, OOB to Chair, PT/OT    #DVT prophylaxis:  - Lovenox 40mg daily  - SCDs    Fever:-at admission, - s/p J&J vaccine prior to admission; ID w/u neg    Dispo: Team conference 3/23  Progress limited last week due to back pain. Would benefit from additional days of rehab   -- TDD: changed to 3/31 based on insurance auth   Family training to be coordinated        Rehab progress and medical status discussed with wife at bedside today

## 2021-03-24 NOTE — PROGRESS NOTE ADULT - SUBJECTIVE AND OBJECTIVE BOX
DAILY PROGRESS NOTE:  HPI:  PENNIE GIANG is a 56M with PMHx of HTN (non-compliant with medications) who presented to [facility] on 03/07/2021 with HA and imbalance s/p fall on 3/6. SBP elevated on admisison >220, thus started on nicardipine gtt; CT head findings c/w right thalamic hemorrhage with intraventricular hemorrhage. Cerebral angiography on 3/8 did not show evidence of AVM. Nicardipine weaned off and started on Clonidine, HCTZ, Nifedipine, and Losartan. Repeat CT head on 3/9 with stable findings. Plan for MRI brain in 4-6 weeks to rule out underlying vascular malformation.    Patient was evaluated by PM&R and therapy for functional deficits and gait/ ADL impairments and recommended acute rehabilitation. Patient was medically optimized for discharge to [facility] on 03/12/2021.    On admission, patient noted to have temp 99.6F oral, 101F rectal. Patient denies feeling febrile, chills, SOB, dysuria, abd pain. COVID-19 PCR, UA and CXR ordered.  (13 Mar 2021 12:23)      Subjective:  Patient was seen and examined at the bedside this am  Back pain resolved,   Had 2 BMs yesterday   BP low when OOB this am , prior to therapy     ROS:  Denied fever, chills, nausea, vomiting, CP, palpitations, coughing, dyspnea      Physical Exam:    Vital Signs Last 24 Hrs  T(C): 37.2 (24 Mar 2021 08:42), Max: 37.2 (24 Mar 2021 08:42)  T(F): 98.9 (24 Mar 2021 08:42), Max: 98.9 (24 Mar 2021 08:42)  HR: 67 (24 Mar 2021 06:10) (67 - 82)  BP: 109/74 (24 Mar 2021 09:36) (96/83 - 129/87)  BP(mean): --  RR: 17 (24 Mar 2021 08:42) (15 - 17)  SpO2: 97% (24 Mar 2021 08:42) (97% - 98%)      Gen - NAD, Comfortable  Pulm - CTAB,  Cardiovascular - S1S2  Abdomen - Soft, NT/ND, +BS  Extremities - no edema or calf tenderness  Neuro-     awake and alert     Fluent, No dysarthria     Cranial Nerves - no facial asymmetry     Motor - 5/5      Psychiatric - Mood stable, Affect WNL      MEDICATIONS  (STANDING):  atorvastatin 80 milliGRAM(s) Oral at bedtime  cloNIDine 0.1 milliGRAM(s) Oral two times a day  dextrose 40% Gel 15 Gram(s) Oral once  dextrose 5%. 1000 milliLiter(s) (50 mL/Hr) IV Continuous <Continuous>  dextrose 5%. 1000 milliLiter(s) (100 mL/Hr) IV Continuous <Continuous>  dextrose 50% Injectable 25 Gram(s) IV Push once  dextrose 50% Injectable 25 Gram(s) IV Push once  dextrose 50% Injectable 12.5 Gram(s) IV Push once  enoxaparin Injectable 40 milliGRAM(s) SubCutaneous <User Schedule>  folic acid 1 milliGRAM(s) Oral daily  gabapentin 300 milliGRAM(s) Oral at bedtime  glucagon  Injectable 1 milliGRAM(s) IntraMuscular once  influenza   Vaccine 0.5 milliLiter(s) IntraMuscular once  insulin lispro (ADMELOG) corrective regimen sliding scale   SubCutaneous three times a day before meals  insulin lispro (ADMELOG) corrective regimen sliding scale   SubCutaneous at bedtime  lactobacillus acidophilus 1 Tablet(s) Oral daily  lidocaine   Patch 1 Patch Transdermal <User Schedule>  lidocaine 4% Topical Solution 1 Application(s) Topical <User Schedule>  losartan 50 milliGRAM(s) Oral daily  melatonin 6 milliGRAM(s) Oral at bedtime  metFORMIN 1000 milliGRAM(s) Oral two times a day  multivitamin 1 Tablet(s) Oral daily  nicotine -  14 mG/24Hr(s) Patch 1 patch Transdermal daily  polyethylene glycol 3350 17 Gram(s) Oral two times a day  senna 2 Tablet(s) Oral at bedtime  sodium chloride 1 Gram(s) Oral daily  thiamine 100 milliGRAM(s) Oral daily    MEDICATIONS  (PRN):  aluminum hydroxide/magnesium hydroxide/simethicone Suspension 30 milliLiter(s) Oral every 4 hours PRN Dyspepsia  bisacodyl 5 milliGRAM(s) Oral every 12 hours PRN Constipation  traMADol 25 milliGRAM(s) Oral every 6 hours PRN Moderate Pain (4 - 6)  traMADol 50 milliGRAM(s) Oral every 6 hours PRN Severe Pain (7 - 10)    03-23    137  |  101  |  16  ----------------------------<  105<H>  5.0   |  30  |  0.87    Ca    9.6      23 Mar 2021 05:50                     CAPILLARY BLOOD GLUCOSE      POCT Blood Glucose.: 118 mg/dL (24 Mar 2021 11:41)  POCT Blood Glucose.: 114 mg/dL (24 Mar 2021 07:47)  POCT Blood Glucose.: 97 mg/dL (23 Mar 2021 22:09)  POCT Blood Glucose.: 100 mg/dL (23 Mar 2021 16:47)

## 2021-03-24 NOTE — PROGRESS NOTE ADULT - SUBJECTIVE AND OBJECTIVE BOX
56 y o M with PMHx of HTN (non-compliant with medications) who presented to [facility] on 03/07/2021 with HA and imbalance s/p fall on 3/6. SBP elevated on admission >220,  CT head findings revealed right thalamic hemorrhage with intraventricular hemorrhage.   received j/j vaccine on 3/12/21    seen at the bedside, no new complaints, no n/v, no sob,   afebrile, no dysuria      Vital Signs Last 24 Hrs  T(C): 37.2 (24 Mar 2021 08:42), Max: 37.2 (24 Mar 2021 08:42)  T(F): 98.9 (24 Mar 2021 08:42), Max: 98.9 (24 Mar 2021 08:42)  HR: 67 (24 Mar 2021 06:10) (67 - 106)  BP: 109/74 (24 Mar 2021 09:36) (96/83 - 129/87)  BP(mean): --  RR: 17 (24 Mar 2021 08:42) (15 - 17)  SpO2: 97% (24 Mar 2021 08:42) (97% - 98%)    GENERAL- NAD  EAR/NOSE/MOUTH/THROAT - no pharyngeal exudates, no oral lesions  MMM  EYES- MAURY, conjunctiva and Sclera clear  NECK- supple  RESPIRATORY-  clear to auscultation bilaterally  CARDIOVASCULAR - SIS2, RRR  GI - soft NT BS present  EXTREMITIES- no pedal edema  NEUROLOGY- no gross focal deficits, no facial asymmetry.   SKIN- no rashes, warm to touch  PSYCHIATRY- AAO X 3  MUSCULOSKELETAL- ROM normal                    x                    137  | 30   | 16           x     >-----------< x       ------------------------< 105                   x                    5.0  | 101  | 0.87                                         Ca 9.6   Mg x     Ph x            CAPILLARY BLOOD GLUCOSE      POCT Blood Glucose.: 118 mg/dL (24 Mar 2021 11:41)  POCT Blood Glucose.: 114 mg/dL (24 Mar 2021 07:47)  POCT Blood Glucose.: 97 mg/dL (23 Mar 2021 22:09)  POCT Blood Glucose.: 100 mg/dL (23 Mar 2021 16:47)      MEDICATIONS  (STANDING):  atorvastatin 80 milliGRAM(s) Oral at bedtime  cloNIDine 0.1 milliGRAM(s) Oral two times a day  enoxaparin Injectable 40 milliGRAM(s) SubCutaneous <User Schedule>  folic acid 1 milliGRAM(s) Oral daily  gabapentin 300 milliGRAM(s) Oral at bedtime  glucagon  Injectable 1 milliGRAM(s) IntraMuscular once  influenza   Vaccine 0.5 milliLiter(s) IntraMuscular once  insulin lispro (ADMELOG) corrective regimen sliding scale   SubCutaneous three times a day before meals  insulin lispro (ADMELOG) corrective regimen sliding scale   SubCutaneous at bedtime  lactobacillus acidophilus 1 Tablet(s) Oral daily  lidocaine   Patch 1 Patch Transdermal <User Schedule>  lidocaine 4% Topical Solution 1 Application(s) Topical <User Schedule>  losartan 50 milliGRAM(s) Oral daily  melatonin 6 milliGRAM(s) Oral at bedtime  metFORMIN 1000 milliGRAM(s) Oral two times a day  multivitamin 1 Tablet(s) Oral daily  nicotine -  14 mG/24Hr(s) Patch 1 patch Transdermal daily  polyethylene glycol 3350 17 Gram(s) Oral two times a day  senna 2 Tablet(s) Oral at bedtime  sodium chloride 1 Gram(s) Oral daily  thiamine 100 milliGRAM(s) Oral daily    MEDICATIONS  (PRN):  aluminum hydroxide/magnesium hydroxide/simethicone Suspension 30 milliLiter(s) Oral every 4 hours PRN Dyspepsia  bisacodyl 5 milliGRAM(s) Oral every 12 hours PRN Constipation  traMADol 25 milliGRAM(s) Oral every 6 hours PRN Moderate Pain (4 - 6)  traMADol 50 milliGRAM(s) Oral every 6 hours PRN Severe Pain (7 - 10)

## 2021-03-25 LAB
ANION GAP SERPL CALC-SCNC: 5 MMOL/L — SIGNIFICANT CHANGE UP (ref 5–17)
APPEARANCE UR: CLEAR — SIGNIFICANT CHANGE UP
BILIRUB UR-MCNC: NEGATIVE — SIGNIFICANT CHANGE UP
BUN SERPL-MCNC: 17 MG/DL — SIGNIFICANT CHANGE UP (ref 7–23)
CALCIUM SERPL-MCNC: 9.5 MG/DL — SIGNIFICANT CHANGE UP (ref 8.4–10.5)
CHLORIDE SERPL-SCNC: 102 MMOL/L — SIGNIFICANT CHANGE UP (ref 96–108)
CO2 SERPL-SCNC: 33 MMOL/L — HIGH (ref 22–31)
COLOR SPEC: YELLOW — SIGNIFICANT CHANGE UP
CREAT SERPL-MCNC: 0.78 MG/DL — SIGNIFICANT CHANGE UP (ref 0.5–1.3)
DIFF PNL FLD: NEGATIVE — SIGNIFICANT CHANGE UP
GLUCOSE BLDC GLUCOMTR-MCNC: 117 MG/DL — HIGH (ref 70–99)
GLUCOSE BLDC GLUCOMTR-MCNC: 120 MG/DL — HIGH (ref 70–99)
GLUCOSE SERPL-MCNC: 114 MG/DL — HIGH (ref 70–99)
GLUCOSE UR QL: NEGATIVE — SIGNIFICANT CHANGE UP
HCT VFR BLD CALC: 41.3 % — SIGNIFICANT CHANGE UP (ref 39–50)
HGB BLD-MCNC: 13.9 G/DL — SIGNIFICANT CHANGE UP (ref 13–17)
KETONES UR-MCNC: NEGATIVE — SIGNIFICANT CHANGE UP
LEUKOCYTE ESTERASE UR-ACNC: NEGATIVE — SIGNIFICANT CHANGE UP
MCHC RBC-ENTMCNC: 31 PG — SIGNIFICANT CHANGE UP (ref 27–34)
MCHC RBC-ENTMCNC: 33.7 GM/DL — SIGNIFICANT CHANGE UP (ref 32–36)
MCV RBC AUTO: 92.2 FL — SIGNIFICANT CHANGE UP (ref 80–100)
NITRITE UR-MCNC: NEGATIVE — SIGNIFICANT CHANGE UP
NRBC # BLD: 0 /100 WBCS — SIGNIFICANT CHANGE UP (ref 0–0)
PH UR: 5 — SIGNIFICANT CHANGE UP (ref 5–8)
PLATELET # BLD AUTO: 261 K/UL — SIGNIFICANT CHANGE UP (ref 150–400)
POTASSIUM SERPL-MCNC: 4.8 MMOL/L — SIGNIFICANT CHANGE UP (ref 3.5–5.3)
POTASSIUM SERPL-SCNC: 4.8 MMOL/L — SIGNIFICANT CHANGE UP (ref 3.5–5.3)
PROT UR-MCNC: NEGATIVE — SIGNIFICANT CHANGE UP
RBC # BLD: 4.48 M/UL — SIGNIFICANT CHANGE UP (ref 4.2–5.8)
RBC # FLD: 11.9 % — SIGNIFICANT CHANGE UP (ref 10.3–14.5)
SARS-COV-2 RNA SPEC QL NAA+PROBE: SIGNIFICANT CHANGE UP
SODIUM SERPL-SCNC: 140 MMOL/L — SIGNIFICANT CHANGE UP (ref 135–145)
SP GR SPEC: 1.02 — SIGNIFICANT CHANGE UP (ref 1.01–1.02)
UROBILINOGEN FLD QL: NEGATIVE — SIGNIFICANT CHANGE UP
WBC # BLD: 7.34 K/UL — SIGNIFICANT CHANGE UP (ref 3.8–10.5)
WBC # FLD AUTO: 7.34 K/UL — SIGNIFICANT CHANGE UP (ref 3.8–10.5)

## 2021-03-25 PROCEDURE — 99233 SBSQ HOSP IP/OBS HIGH 50: CPT

## 2021-03-25 PROCEDURE — 71046 X-RAY EXAM CHEST 2 VIEWS: CPT | Mod: 26

## 2021-03-25 PROCEDURE — 93010 ELECTROCARDIOGRAM REPORT: CPT

## 2021-03-25 PROCEDURE — 99232 SBSQ HOSP IP/OBS MODERATE 35: CPT | Mod: GC

## 2021-03-25 RX ORDER — ACETAMINOPHEN 500 MG
650 TABLET ORAL EVERY 6 HOURS
Refills: 0 | Status: DISCONTINUED | OUTPATIENT
Start: 2021-03-25 | End: 2021-03-31

## 2021-03-25 RX ADMIN — POLYETHYLENE GLYCOL 3350 17 GRAM(S): 17 POWDER, FOR SOLUTION ORAL at 05:19

## 2021-03-25 RX ADMIN — Medication 0.1 MILLIGRAM(S): at 17:24

## 2021-03-25 RX ADMIN — Medication 1 TABLET(S): at 12:34

## 2021-03-25 RX ADMIN — Medication 30 MILLIGRAM(S): at 08:16

## 2021-03-25 RX ADMIN — Medication 650 MILLIGRAM(S): at 16:34

## 2021-03-25 RX ADMIN — ATORVASTATIN CALCIUM 80 MILLIGRAM(S): 80 TABLET, FILM COATED ORAL at 20:54

## 2021-03-25 RX ADMIN — GABAPENTIN 300 MILLIGRAM(S): 400 CAPSULE ORAL at 20:54

## 2021-03-25 RX ADMIN — LIDOCAINE 1 PATCH: 4 CREAM TOPICAL at 20:53

## 2021-03-25 RX ADMIN — POLYETHYLENE GLYCOL 3350 17 GRAM(S): 17 POWDER, FOR SOLUTION ORAL at 17:25

## 2021-03-25 RX ADMIN — ENOXAPARIN SODIUM 40 MILLIGRAM(S): 100 INJECTION SUBCUTANEOUS at 17:25

## 2021-03-25 RX ADMIN — LOSARTAN POTASSIUM 50 MILLIGRAM(S): 100 TABLET, FILM COATED ORAL at 05:19

## 2021-03-25 RX ADMIN — METFORMIN HYDROCHLORIDE 1000 MILLIGRAM(S): 850 TABLET ORAL at 17:24

## 2021-03-25 RX ADMIN — Medication 1 MILLIGRAM(S): at 12:34

## 2021-03-25 RX ADMIN — Medication 1 PATCH: at 12:33

## 2021-03-25 RX ADMIN — Medication 1 PATCH: at 08:13

## 2021-03-25 RX ADMIN — LIDOCAINE 1 PATCH: 4 CREAM TOPICAL at 08:14

## 2021-03-25 RX ADMIN — Medication 0.1 MILLIGRAM(S): at 05:19

## 2021-03-25 RX ADMIN — SODIUM CHLORIDE 1 GRAM(S): 9 INJECTION INTRAMUSCULAR; INTRAVENOUS; SUBCUTANEOUS at 12:34

## 2021-03-25 RX ADMIN — Medication 6 MILLIGRAM(S): at 20:54

## 2021-03-25 RX ADMIN — METFORMIN HYDROCHLORIDE 1000 MILLIGRAM(S): 850 TABLET ORAL at 08:16

## 2021-03-25 RX ADMIN — Medication 1 PATCH: at 16:02

## 2021-03-25 RX ADMIN — Medication 100 MILLIGRAM(S): at 12:33

## 2021-03-25 RX ADMIN — Medication 1 PATCH: at 23:44

## 2021-03-25 RX ADMIN — Medication 650 MILLIGRAM(S): at 16:04

## 2021-03-25 RX ADMIN — LIDOCAINE 1 APPLICATION(S): 4 CREAM TOPICAL at 20:55

## 2021-03-25 NOTE — PROGRESS NOTE ADULT - SUBJECTIVE AND OBJECTIVE BOX
DAILY PROGRESS NOTE:  HPI:  PENNIE GIANG is a 56M with PMHx of HTN (non-compliant with medications) who presented to [facility] on 2021 with HA and imbalance s/p fall on 3/6. SBP elevated on admisison >220, thus started on nicardipine gtt; CT head findings c/w right thalamic hemorrhage with intraventricular hemorrhage. Cerebral angiography on 3/8 did not show evidence of AVM. Nicardipine weaned off and started on Clonidine, HCTZ, Nifedipine, and Losartan. Repeat CT head on 3/9 with stable findings. Plan for MRI brain in 4-6 weeks to rule out underlying vascular malformation.    Patient was evaluated by PM&R and therapy for functional deficits and gait/ ADL impairments and recommended acute rehabilitation. Patient was medically optimized for discharge to [facility] on 2021.    On admission, patient noted to have temp 99.6F oral, 101F rectal. Patient denies feeling febrile, chills, SOB, dysuria, abd pain. COVID-19 PCR, UA and CXR ordered.  (13 Mar 2021 12:23)      Subjective:  Patient was seen and examined at the bedside this AM; [place]  ID [id number] provided Azeri translations. No acute overnight events. States that he felt cold last night and had some chills. States he still has some chills this morning, along with cold sweats. Reports that he thinks he may be coming down with the cold. States that when he experiences these symptoms at home, he would take an Advil to improve symptoms. Otherwise, reported no other complaints; LBP is improved.     ROS:  Denied fever, chills, nausea, vomiting, CP, palpitations, coughing, wheezing, SOB, or abdominal pain.       Physical Exam:  Vital Signs Last 24 Hrs  T(C): 36.9 (25 Mar 2021 10:30), Max: 37.2 (25 Mar 2021 07:49)  T(F): 98.5 (25 Mar 2021 10:30), Max: 99 (25 Mar 2021 07:49)  HR: 81 (25 Mar 2021 07:49) (73 - 87)  BP: 123/82 (25 Mar 2021 07:49) (114/74 - 136/89)  RR: 16 (25 Mar 2021 07:49) (16 - 16)  SpO2: 100% (25 Mar 2021 07:49) (99% - 100%)      Gen - NAD, Comfortable  Pulm - CTAB,  Cardiovascular - S1S2  Abdomen - Soft, NT/ND, +BS  Extremities - no edema or calf tenderness  Neuro-     awake and alert     Fluent, No dysarthria     Cranial Nerves - no facial asymmetry     Motor - 5/5   Psychiatric - Mood stable, Affect WNL      Recent Labs/Imagin.9   7.34  )-----------( 261      ( 25 Mar 2021 06:26 )             41.3         140  |  102  |  17  ----------------------------<  114<H>  4.8   |  33<H>  |  0.78    Ca    9.5      25 Mar 2021 06:26    POCT Blood Glucose.: 117 mg/dL (21 @ 08:13)  POCT Blood Glucose.: 113 mg/dL (21 @ 20:22)  POCT Blood Glucose.: 110 mg/dL (21 @ 16:37)      Medications:  aluminum hydroxide/magnesium hydroxide/simethicone Suspension 30 milliLiter(s) Oral every 4 hours PRN  atorvastatin 80 milliGRAM(s) Oral at bedtime  bisacodyl 5 milliGRAM(s) Oral every 12 hours PRN  cloNIDine 0.1 milliGRAM(s) Oral two times a day  dextrose 40% Gel 15 Gram(s) Oral once  dextrose 5%. 1000 milliLiter(s) IV Continuous <Continuous>  dextrose 5%. 1000 milliLiter(s) IV Continuous <Continuous>  dextrose 50% Injectable 25 Gram(s) IV Push once  dextrose 50% Injectable 12.5 Gram(s) IV Push once  dextrose 50% Injectable 25 Gram(s) IV Push once  enoxaparin Injectable 40 milliGRAM(s) SubCutaneous <User Schedule>  folic acid 1 milliGRAM(s) Oral daily  gabapentin 300 milliGRAM(s) Oral at bedtime  glucagon  Injectable 1 milliGRAM(s) IntraMuscular once  influenza   Vaccine 0.5 milliLiter(s) IntraMuscular once  insulin lispro (ADMELOG) corrective regimen sliding scale   SubCutaneous two times a day with meals  lactobacillus acidophilus 1 Tablet(s) Oral daily  lidocaine   Patch 1 Patch Transdermal <User Schedule>  lidocaine 4% Topical Solution 1 Application(s) Topical <User Schedule>  losartan 50 milliGRAM(s) Oral daily  melatonin 6 milliGRAM(s) Oral at bedtime  metFORMIN 1000 milliGRAM(s) Oral two times a day  multivitamin 1 Tablet(s) Oral daily  nicotine -  14 mG/24Hr(s) Patch 1 patch Transdermal daily  NIFEdipine XL 30 milliGRAM(s) Oral <User Schedule>  polyethylene glycol 3350 17 Gram(s) Oral two times a day  senna 2 Tablet(s) Oral at bedtime  sodium chloride 1 Gram(s) Oral daily  thiamine 100 milliGRAM(s) Oral daily  traMADol 25 milliGRAM(s) Oral every 6 hours PRN  traMADol 50 milliGRAM(s) Oral every 6 hours PRN

## 2021-03-25 NOTE — PROGRESS NOTE ADULT - ATTENDING COMMENTS
Chart reviewed. Patient seen at bedside  Reports chills overnight without any documented elevated temps. WBC WNL. CXR and UA - negative.   COVID PCR - negative.   Patient denies any back pain   No change in neuro exam.  Labs with normal sodium- d/c salt tabs. Liberalise fluid restriction to 1500    2. BP now trending to normal- Meds adjusted. Goal to wean off clonidine prior to discharge.   Case d/w hospitalist     3. Continue rehab program

## 2021-03-25 NOTE — PROGRESS NOTE ADULT - ASSESSMENT
PENNIE GIANG is a 56M with PMHx of HTN (non-compliant with medications) who presented to [facility] on 03/07/2021 with HA and imbalance s/p fall on 3/6, admission SBP >220, found to have right thalamic hemorrhage with intraventricular extension. Admitted for multidisciplinary rehab program.      #Comprehensive Multidisciplinary Rehab Program:- PT/OT/ SLP 3 hours a day 5 days a week    #Right thalamic hemorrhage with intraventricular extension:- likely 2/2 uncontrolled HTN  - s/p cerebral angiography-->no evidence of AVM; last CT on 3/9 was stable  - per neuro, MRI in 4-6 weeks to r/o vascular malformations  - BP control, as below    #LBP:- Improved  c/w Gabapentin to 300mg qhs  - tramadol prn   - Lidocaine patch daily with Lidocaine gel for low back at night    #Chills/cold sweats  - no fever overnight; WBC WNL  - obtain rectal temperature  - ECG and COVID PCR    #Hyponatremia: improved; Na now WNL   - most likely SIADH - reduce salt tabs to once daily   Continue fluid restriction     Labs with hyperkalemia: Hyperkalemia improved on report labs      HTN:- c/w Clonidine 0.2mg TID,( dose reduced 3/22)  Nifedipine 90mg daily, and Losartan 50mg daily,  Hydralazine 10mg TID     HLD:- c/w Atorvastatin 80mg qhs    Smoking history:- c/w Nicotine patch daily    #Sleep:- Melatonin PRN    #GI/Bowel:on senna and miralax. Add dulcolax . MOM today if no BM . Increase miralax to bid     #/Bladder:- Toileting schedule q4h    #Diet :- Diet: DASH/TLC and carb consistent  - Nutrition to follow    #Skin/ Pressure Injury Prevention: Turn Q2hrs in bed while awake, OOB to Chair, PT/OT    #DVT prophylaxis:  - Lovenox 40mg daily  - SCDs    Fever:-at admission, - s/p J&J vaccine prior to admission; ID w/u neg    Dispo: Team conference 3/23  Progress limited last week due to back pain. Would benefit from additional days of rehab   -- TDD: changed to 3/31 based on insurance auth   Family training to be coordinated        Rehab progress and medical status discussed with wife at bedside today           PENNIE GIANG is a 56M with PMHx of HTN (non-compliant with medications) who presented to [facility] on 03/07/2021 with HA and imbalance s/p fall on 3/6, admission SBP >220, found to have right thalamic hemorrhage with intraventricular extension. Admitted for multidisciplinary rehab program.      #Comprehensive Multidisciplinary Rehab Program:- PT/OT/ SLP 3 hours a day 5 days a week    #Right thalamic hemorrhage with intraventricular extension:- likely 2/2 uncontrolled HTN  - s/p cerebral angiography-->no evidence of AVM; last CT on 3/9 was stable  - per neuro, MRI in 4-6 weeks to r/o vascular malformations  - BP control, as below    #LBP:- Improved  c/w Gabapentin to 300mg qhs  - tramadol prn   - Lidocaine patch daily with Lidocaine gel for low back at night    #Chills/cold sweats  - no fever overnight; WBC WNL  - obtain rectal temperature  - ECG and COVID PCR    #Hyponatremia: improved; Na now WNL   - most likely SIADH - reduce salt tabs to once daily   Continue fluid restriction     HTN:- c/w Clonidine 0.2mg bid,( dose reduced 3/24)  Nifedipine  XL 30mg daily, and Losartan 50mg daily,    HLD:- c/w Atorvastatin 80mg qhs    Smoking history:- c/w Nicotine patch daily    #Sleep:- Melatonin PRN    #GI/Bowel:on senna and miralax. Add dulcolax . MOM today if no BM . Increase miralax to bid     #/Bladder:- Toileting schedule q4h    #Diet :- Diet: DASH/TLC and carb consistent  - Nutrition to follow    #Skin/ Pressure Injury Prevention: Turn Q2hrs in bed while awake, OOB to Chair, PT/OT    #DVT prophylaxis:  - Lovenox 40mg daily  - SCDs    Fever:-at admission, - s/p J&J vaccine prior to admission; ID w/u neg    Dispo: Team conference 3/23  Progress limited last week due to back pain. Would benefit from additional days of rehab   -- TDD: changed to 3/31 based on insurance auth   Family training to be coordinated        Rehab progress and medical status discussed with wife at bedside today           PENNIE GIANG is a 56M with PMHx of HTN (non-compliant with medications) who presented to [facility] on 03/07/2021 with HA and imbalance s/p fall on 3/6, admission SBP >220, found to have right thalamic hemorrhage with intraventricular extension. Admitted for multidisciplinary rehab program.      #Comprehensive Multidisciplinary Rehab Program:- PT/OT/ SLP 3 hours a day 5 days a week    #Right thalamic hemorrhage with intraventricular extension:- likely 2/2 uncontrolled HTN  - s/p cerebral angiography-->no evidence of AVM; last CT on 3/9 was stable  - per neuro, MRI in 4-6 weeks to r/o vascular malformations  - BP control, as below    #LBP:- Improved  c/w Gabapentin to 300mg qhs  - tramadol prn   - Lidocaine patch daily with Lidocaine gel for low back at night    #Chills/cold sweats  - no fever overnight; WBC WNL  - obtain rectal temperature  - ECG and COVID PCR    #Hyponatremia: improved; Na now WNL   - most likely SIADH - reduce salt tabs to once daily   Continue fluid restriction     HTN:- c/w Clonidine 0.1mg bid,( dose reduced 3/24)  Nifedipine  XL 30mg daily, and Losartan 50mg daily,    HLD:- c/w Atorvastatin 80mg qhs    Smoking history:- c/w Nicotine patch daily    #Sleep:- Melatonin PRN    #GI/Bowel:on senna and miralax. Add dulcolax . MOM today if no BM . Increase miralax to bid     #/Bladder:- Toileting schedule q4h    #Diet :- Diet: DASH/TLC and carb consistent  - Nutrition to follow    #Skin/ Pressure Injury Prevention: Turn Q2hrs in bed while awake, OOB to Chair, PT/OT    #DVT prophylaxis:  - Lovenox 40mg daily  - SCDs    Fever:-at admission, - s/p J&J vaccine prior to admission; ID w/u neg    Dispo: Team conference 3/23  Progress limited last week due to back pain. Would benefit from additional days of rehab   -- TDD: changed to 3/31 based on insurance auth   Family training to be coordinated        Rehab progress and medical status discussed with wife at bedside today

## 2021-03-25 NOTE — PROGRESS NOTE ADULT - SUBJECTIVE AND OBJECTIVE BOX
56 y o M with PMHx of HTN (non-compliant with medications) who presented to [facility] on 03/07/2021 with HA and imbalance s/p fall on 3/6. SBP elevated on admission >220,  CT head findings revealed right thalamic hemorrhage with intraventricular hemorrhage.   received j/j vaccine on 3/12/21    seen at the bedside, c/o chills and sweating this am, no new complaints, no n/v, no sob,   afebrile, no dysuria      Vital Signs Last 24 Hrs  T(C): 36.9 (25 Mar 2021 10:30), Max: 37.2 (25 Mar 2021 07:49)  T(F): 98.5 (25 Mar 2021 10:30), Max: 99 (25 Mar 2021 07:49)  HR: 81 (25 Mar 2021 07:49) (73 - 87)  BP: 123/82 (25 Mar 2021 07:49) (114/74 - 136/89)  BP(mean): --  RR: 16 (25 Mar 2021 07:49) (16 - 16)  SpO2: 100% (25 Mar 2021 07:49) (99% - 100%)        GENERAL- NAD  EAR/NOSE/MOUTH/THROAT - no pharyngeal exudates, no oral lesions  MMM  EYES- MAURY, conjunctiva and Sclera clear  NECK- supple  RESPIRATORY-  clear to auscultation bilaterally  CARDIOVASCULAR - SIS2, RRR  GI - soft NT BS present  EXTREMITIES- no pedal edema  NEUROLOGY- no gross focal deficits, no facial asymmetry.   SKIN- no rashes, warm to touch  PSYCHIATRY- AAO X 3  MUSCULOSKELETAL- ROM normal                    x                    137  | 30   | 16           x     >-----------< x       ------------------------< 105                   x                    5.0  | 101  | 0.87                                         Ca 9.6   Mg x     Ph x            CAPILLARY BLOOD GLUCOSE      POCT Blood Glucose.: 118 mg/dL (24 Mar 2021 11:41)  POCT Blood Glucose.: 114 mg/dL (24 Mar 2021 07:47)  POCT Blood Glucose.: 97 mg/dL (23 Mar 2021 22:09)  POCT Blood Glucose.: 100 mg/dL (23 Mar 2021 16:47)      MEDICATIONS  (STANDING):  atorvastatin 80 milliGRAM(s) Oral at bedtime  cloNIDine 0.1 milliGRAM(s) Oral two times a day  enoxaparin Injectable 40 milliGRAM(s) SubCutaneous <User Schedule>  folic acid 1 milliGRAM(s) Oral daily  gabapentin 300 milliGRAM(s) Oral at bedtime  glucagon  Injectable 1 milliGRAM(s) IntraMuscular once  influenza   Vaccine 0.5 milliLiter(s) IntraMuscular once  insulin lispro (ADMELOG) corrective regimen sliding scale   SubCutaneous three times a day before meals  insulin lispro (ADMELOG) corrective regimen sliding scale   SubCutaneous at bedtime  lactobacillus acidophilus 1 Tablet(s) Oral daily  lidocaine   Patch 1 Patch Transdermal <User Schedule>  lidocaine 4% Topical Solution 1 Application(s) Topical <User Schedule>  losartan 50 milliGRAM(s) Oral daily  melatonin 6 milliGRAM(s) Oral at bedtime  metFORMIN 1000 milliGRAM(s) Oral two times a day  multivitamin 1 Tablet(s) Oral daily  nicotine -  14 mG/24Hr(s) Patch 1 patch Transdermal daily  polyethylene glycol 3350 17 Gram(s) Oral two times a day  senna 2 Tablet(s) Oral at bedtime  sodium chloride 1 Gram(s) Oral daily  thiamine 100 milliGRAM(s) Oral daily    MEDICATIONS  (PRN):  aluminum hydroxide/magnesium hydroxide/simethicone Suspension 30 milliLiter(s) Oral every 4 hours PRN Dyspepsia  bisacodyl 5 milliGRAM(s) Oral every 12 hours PRN Constipation  traMADol 25 milliGRAM(s) Oral every 6 hours PRN Moderate Pain (4 - 6)  traMADol 50 milliGRAM(s) Oral every 6 hours PRN Severe Pain (7 - 10)   56 y o M with PMHx of HTN (non-compliant with medications) who presented to [facility] on 03/07/2021 with HA and imbalance s/p fall on 3/6. SBP elevated on admission >220,  CT head findings revealed right thalamic hemorrhage with intraventricular hemorrhage.   received j/j vaccine on 3/12/21    seen at the bedside, c/o chills and sweating this am, no new complaints, no n/v, no sob,   afebrile, no dysuria      Vital Signs Last 24 Hrs  T(C): 36.9 (25 Mar 2021 10:30), Max: 37.2 (25 Mar 2021 07:49)  T(F): 98.5 (25 Mar 2021 10:30), Max: 99 (25 Mar 2021 07:49)  HR: 81 (25 Mar 2021 07:49) (73 - 87)  BP: 123/82 (25 Mar 2021 07:49) (114/74 - 136/89)  BP(mean): --  RR: 16 (25 Mar 2021 07:49) (16 - 16)  SpO2: 100% (25 Mar 2021 07:49) (99% - 100%)        GENERAL- NAD  EAR/NOSE/MOUTH/THROAT - no pharyngeal exudates, no oral lesions  MMM  EYES- MAURY, conjunctiva and Sclera clear  NECK- supple  RESPIRATORY-  clear to auscultation bilaterally  CARDIOVASCULAR - SIS2, RRR  GI - soft NT BS present  EXTREMITIES- no pedal edema  NEUROLOGY- no gross focal deficits, no facial asymmetry.   SKIN- no rashes, warm to touch  PSYCHIATRY- AAO X 3  MUSCULOSKELETAL- ROM normal                    x                    137  | 30   | 16           x     >-----------< x       ------------------------< 105                   x                    5.0  | 101  | 0.87                                         Ca 9.6   Mg x     Ph x          Labs reviewed:     CXR personally reviewed: napd    ECG reviewed and interpreted: sinus 82 3/24/21      CAPILLARY BLOOD GLUCOSE      POCT Blood Glucose.: 118 mg/dL (24 Mar 2021 11:41)  POCT Blood Glucose.: 114 mg/dL (24 Mar 2021 07:47)  POCT Blood Glucose.: 97 mg/dL (23 Mar 2021 22:09)  POCT Blood Glucose.: 100 mg/dL (23 Mar 2021 16:47)      MEDICATIONS  (STANDING):  atorvastatin 80 milliGRAM(s) Oral at bedtime  cloNIDine 0.1 milliGRAM(s) Oral two times a day  enoxaparin Injectable 40 milliGRAM(s) SubCutaneous <User Schedule>  folic acid 1 milliGRAM(s) Oral daily  gabapentin 300 milliGRAM(s) Oral at bedtime  glucagon  Injectable 1 milliGRAM(s) IntraMuscular once  influenza   Vaccine 0.5 milliLiter(s) IntraMuscular once  insulin lispro (ADMELOG) corrective regimen sliding scale   SubCutaneous three times a day before meals  insulin lispro (ADMELOG) corrective regimen sliding scale   SubCutaneous at bedtime  lactobacillus acidophilus 1 Tablet(s) Oral daily  lidocaine   Patch 1 Patch Transdermal <User Schedule>  lidocaine 4% Topical Solution 1 Application(s) Topical <User Schedule>  losartan 50 milliGRAM(s) Oral daily  melatonin 6 milliGRAM(s) Oral at bedtime  metFORMIN 1000 milliGRAM(s) Oral two times a day  multivitamin 1 Tablet(s) Oral daily  nicotine -  14 mG/24Hr(s) Patch 1 patch Transdermal daily  polyethylene glycol 3350 17 Gram(s) Oral two times a day  senna 2 Tablet(s) Oral at bedtime  sodium chloride 1 Gram(s) Oral daily  thiamine 100 milliGRAM(s) Oral daily    MEDICATIONS  (PRN):  aluminum hydroxide/magnesium hydroxide/simethicone Suspension 30 milliLiter(s) Oral every 4 hours PRN Dyspepsia  bisacodyl 5 milliGRAM(s) Oral every 12 hours PRN Constipation  traMADol 25 milliGRAM(s) Oral every 6 hours PRN Moderate Pain (4 - 6)  traMADol 50 milliGRAM(s) Oral every 6 hours PRN Severe Pain (7 - 10)

## 2021-03-25 NOTE — PROGRESS NOTE ADULT - ASSESSMENT
56M with PMHx of HTN (non-compliant with medications) who presented to [facility] on 03/07/2021 with HA and imbalance s/p fall on 3/6, admission SBP >220, found to have right thalamic hemorrhage with intraventricular extension.  Admitted for multidisciplinary rehab program- pt/ot/dvt ppx    #Right thalamic hemorrhage with intraventricular extension  - likely 2/2 uncontrolled HTN, per chart patient non complaint with meds.  - s/p cerebral angiography-->no evidence of AVM; last CT on 3/9 was stable  - per neuro, MRI in 4-6 weeks to r/o vascular malformations      #DM2- Accu-Cheks noted - low, lantus d/cd 3/24/21, c/w metformin, reassess in am  - A1C with Estimated Average Glucose Result: 8.7% (03.08.21 @ 05:08)    # hyponatremia likely SIADH- improved, monitor - stop hctz, d/c salt tabs, c/w fluid restriction for now,  reassess    # hypokalemia monitor, replete prn,  hctz was d/c    #HTN- noted low this am  - decreased Clonidine and  Nifedipine 3/24/21, c/w Losartan   -  hctz was d/cd due to hyponatremia and hypokalemia  - mag normal 3/15/21    #HLD  - c/w Atorvastatin    #LBP  - CT lumbar spine negative for acute pathology    #Smoking history  - c/w Nicotine patch daily  - advised cessation    #insomnia- Melatonin PRN    #DVT prophylaxis- Lovenox     will follow  d/w dr. ness 56M with PMHx of HTN (non-compliant with medications) who presented to [facility] on 03/07/2021 with HA and imbalance s/p fall on 3/6, admission SBP >220, found to have right thalamic hemorrhage with intraventricular extension.  Admitted for multidisciplinary rehab program- pt/ot/dvt ppx    # chills/ sweating this am- cxr negative, labs wnl, vitals stable, covid pending, will monitor    #Right thalamic hemorrhage with intraventricular extension  - likely 2/2 uncontrolled HTN, per chart patient non complaint with meds.  - s/p cerebral angiography-->no evidence of AVM; last CT on 3/9 was stable  - per neuro, MRI in 4-6 weeks to r/o vascular malformations      #DM2- Accu-Cheks noted - low, lantus d/cd 3/24/21, c/w metformin, reassess in am  - A1C with Estimated Average Glucose Result: 8.7% (03.08.21 @ 05:08)    # hyponatremia likely SIADH- improved, monitor - stop hctz, d/c salt tabs, c/w fluid restriction for now,  reassess    # hypokalemia monitor, replete prn,  hctz was d/c    #HTN- noted low this am  - decreased Clonidine and  Nifedipine 3/24/21, c/w Losartan   -  hctz was d/cd due to hyponatremia and hypokalemia  - mag normal 3/15/21    #HLD  - c/w Atorvastatin    #LBP  - CT lumbar spine negative for acute pathology    #Smoking history  - c/w Nicotine patch daily  - advised cessation    #insomnia- Melatonin PRN    #DVT prophylaxis- Lovenox     will follow  d/w dr. ness

## 2021-03-26 LAB
GLUCOSE BLDC GLUCOMTR-MCNC: 111 MG/DL — HIGH (ref 70–99)
GLUCOSE BLDC GLUCOMTR-MCNC: 119 MG/DL — HIGH (ref 70–99)

## 2021-03-26 PROCEDURE — 99233 SBSQ HOSP IP/OBS HIGH 50: CPT

## 2021-03-26 PROCEDURE — 99232 SBSQ HOSP IP/OBS MODERATE 35: CPT | Mod: GC

## 2021-03-26 RX ORDER — METFORMIN HYDROCHLORIDE 850 MG/1
850 TABLET ORAL
Refills: 0 | Status: DISCONTINUED | OUTPATIENT
Start: 2021-03-26 | End: 2021-03-31

## 2021-03-26 RX ADMIN — Medication 1 PATCH: at 22:19

## 2021-03-26 RX ADMIN — LIDOCAINE 1 PATCH: 4 CREAM TOPICAL at 19:18

## 2021-03-26 RX ADMIN — ENOXAPARIN SODIUM 40 MILLIGRAM(S): 100 INJECTION SUBCUTANEOUS at 18:30

## 2021-03-26 RX ADMIN — Medication 1 TABLET(S): at 12:23

## 2021-03-26 RX ADMIN — Medication 100 MILLIGRAM(S): at 12:26

## 2021-03-26 RX ADMIN — Medication 0.1 MILLIGRAM(S): at 05:07

## 2021-03-26 RX ADMIN — SENNA PLUS 2 TABLET(S): 8.6 TABLET ORAL at 22:13

## 2021-03-26 RX ADMIN — Medication 5 MILLIGRAM(S): at 22:14

## 2021-03-26 RX ADMIN — GABAPENTIN 300 MILLIGRAM(S): 400 CAPSULE ORAL at 22:13

## 2021-03-26 RX ADMIN — Medication 1 PATCH: at 08:51

## 2021-03-26 RX ADMIN — ATORVASTATIN CALCIUM 80 MILLIGRAM(S): 80 TABLET, FILM COATED ORAL at 22:14

## 2021-03-26 RX ADMIN — LIDOCAINE 1 PATCH: 4 CREAM TOPICAL at 08:34

## 2021-03-26 RX ADMIN — Medication 0.1 MILLIGRAM(S): at 18:30

## 2021-03-26 RX ADMIN — LOSARTAN POTASSIUM 50 MILLIGRAM(S): 100 TABLET, FILM COATED ORAL at 05:07

## 2021-03-26 RX ADMIN — METFORMIN HYDROCHLORIDE 850 MILLIGRAM(S): 850 TABLET ORAL at 18:31

## 2021-03-26 RX ADMIN — POLYETHYLENE GLYCOL 3350 17 GRAM(S): 17 POWDER, FOR SOLUTION ORAL at 18:31

## 2021-03-26 RX ADMIN — LIDOCAINE 1 APPLICATION(S): 4 CREAM TOPICAL at 22:16

## 2021-03-26 RX ADMIN — Medication 1 MILLIGRAM(S): at 12:24

## 2021-03-26 RX ADMIN — Medication 1 PATCH: at 12:26

## 2021-03-26 RX ADMIN — Medication 30 MILLIGRAM(S): at 08:34

## 2021-03-26 RX ADMIN — Medication 1 PATCH: at 12:33

## 2021-03-26 RX ADMIN — METFORMIN HYDROCHLORIDE 1000 MILLIGRAM(S): 850 TABLET ORAL at 05:08

## 2021-03-26 RX ADMIN — LIDOCAINE 1 PATCH: 4 CREAM TOPICAL at 22:18

## 2021-03-26 RX ADMIN — Medication 6 MILLIGRAM(S): at 22:13

## 2021-03-26 RX ADMIN — POLYETHYLENE GLYCOL 3350 17 GRAM(S): 17 POWDER, FOR SOLUTION ORAL at 05:08

## 2021-03-26 NOTE — PROGRESS NOTE ADULT - SUBJECTIVE AND OBJECTIVE BOX
56 y o M with PMHx of HTN (non-compliant with medications) who presented to [facility] on 2021 with HA and imbalance s/p fall on 3/6. SBP elevated on admission >220,  CT head findings revealed right thalamic hemorrhage with intraventricular hemorrhage.   received j/j vaccine on 3/12/21    seen at the bedside,  no new complaints, no n/v, no sob,   afebrile, no dysuria      Vital Signs Last 24 Hrs  T(C): 36.8 (26 Mar 2021 08:58), Max: 37.3 (25 Mar 2021 21:01)  T(F): 98.2 (26 Mar 2021 08:58), Max: 99.2 (25 Mar 2021 21:01)  HR: 79 (26 Mar 2021 08:58) (79 - 100)  BP: 131/90 (26 Mar 2021 08:58) (125/82 - 132/82)  BP(mean): --  RR: 16 (26 Mar 2021 08:58) (16 - 16)  SpO2: 100% (26 Mar 2021 08:58) (97% - 100%)        GENERAL- NAD  EAR/NOSE/MOUTH/THROAT - no pharyngeal exudates, no oral lesions  MMM  EYES- MAURY, conjunctiva and Sclera clear  NECK- supple  RESPIRATORY-  clear to auscultation bilaterally  CARDIOVASCULAR - SIS2, RRR  GI - soft NT BS present  EXTREMITIES- no pedal edema  NEUROLOGY- no gross focal deficits, no facial asymmetry.   SKIN- no rashes, warm to touch  PSYCHIATRY- AAO X 3  MUSCULOSKELETAL- ROM normal                  13.9                 140  | 33   | 17           7.34  >-----------< 261     ------------------------< 114                   41.3                 4.8  | 102  | 0.78                                         Ca 9.5   Mg x     Ph x      Urinalysis Basic - ( 25 Mar 2021 16:10 )    Color: Yellow / Appearance: Clear / S.025 / pH: x  Gluc: x / Ketone: Negative  / Bili: Negative / Urobili: Negative   Blood: x / Protein: Negative / Nitrite: Negative   Leuk Esterase: Negative / RBC: x / WBC x   Sq Epi: x / Non Sq Epi: x / Bacteria: x      CAPILLARY BLOOD GLUCOSE      POCT Blood Glucose.: 111 mg/dL (26 Mar 2021 07:52)  POCT Blood Glucose.: 120 mg/dL (25 Mar 2021 16:36)    Labs reviewed:     CXR personally reviewed: Clear lung fields bilaterally  < from: Xray Chest 2 Views PA/Lat (21 @ 11:25) >    IMPRESSION:    No acute radiographic cardiopulmonary pathology.    < end of copied text >      ECG reviewed and interpreted:   < from: 12 Lead ECG (21 @ 11:43) >  Ventricular Rate 82 BPM    Atrial Rate 82 BPM    P-R Interval 224 ms    QRS Duration 100 ms    Q-T Interval 370 ms    QTC Calculation(Bazett) 432 ms    P Axis -28 degrees    R Axis -76 degrees    T Axis -26 degrees    Diagnosis Line Sinus rhythm with 1st degree AV block  Left axis deviation  Inferior infarct , age undetermined  Anterolateral infarct , age undetermined  Abnormal ECG  No previous ECGs available    < end of copied text >    MEDICATIONS  (STANDING):  atorvastatin 80 milliGRAM(s) Oral at bedtime  cloNIDine 0.1 milliGRAM(s) Oral two times a day  dextrose 40% Gel 15 Gram(s) Oral once  dextrose 5%. 1000 milliLiter(s) (50 mL/Hr) IV Continuous <Continuous>  dextrose 5%. 1000 milliLiter(s) (100 mL/Hr) IV Continuous <Continuous>  dextrose 50% Injectable 25 Gram(s) IV Push once  dextrose 50% Injectable 12.5 Gram(s) IV Push once  dextrose 50% Injectable 25 Gram(s) IV Push once  enoxaparin Injectable 40 milliGRAM(s) SubCutaneous <User Schedule>  folic acid 1 milliGRAM(s) Oral daily  gabapentin 300 milliGRAM(s) Oral at bedtime  glucagon  Injectable 1 milliGRAM(s) IntraMuscular once  influenza   Vaccine 0.5 milliLiter(s) IntraMuscular once  insulin lispro (ADMELOG) corrective regimen sliding scale   SubCutaneous two times a day with meals  lactobacillus acidophilus 1 Tablet(s) Oral daily  lidocaine   Patch 1 Patch Transdermal <User Schedule>  lidocaine 4% Topical Solution 1 Application(s) Topical <User Schedule>  losartan 50 milliGRAM(s) Oral daily  melatonin 6 milliGRAM(s) Oral at bedtime  metFORMIN 1000 milliGRAM(s) Oral two times a day  multivitamin 1 Tablet(s) Oral daily  nicotine -  14 mG/24Hr(s) Patch 1 patch Transdermal daily  NIFEdipine XL 30 milliGRAM(s) Oral <User Schedule>  polyethylene glycol 3350 17 Gram(s) Oral two times a day  senna 2 Tablet(s) Oral at bedtime  thiamine 100 milliGRAM(s) Oral daily    MEDICATIONS  (PRN):  acetaminophen   Tablet .. 650 milliGRAM(s) Oral every 6 hours PRN Temp greater or equal to 38C (100.4F), Mild Pain (1 - 3)  aluminum hydroxide/magnesium hydroxide/simethicone Suspension 30 milliLiter(s) Oral every 4 hours PRN Dyspepsia  bisacodyl 5 milliGRAM(s) Oral every 12 hours PRN Constipation

## 2021-03-26 NOTE — PROGRESS NOTE ADULT - ATTENDING COMMENTS
Patient seen at bedside  No further chills.  Denies LBP, but feels that his legs are weak.   Also with some constipation- but denies abdominal pain or nausea- Additional bowel meds as needed    2. Continue rehab program     3. Labs on Monday

## 2021-03-26 NOTE — PROGRESS NOTE ADULT - ASSESSMENT
56M with PMHx of HTN (non-compliant with medications) who presented to [facility] on 03/07/2021 with HA and imbalance s/p fall on 3/6, admission SBP >220, found to have right thalamic hemorrhage with intraventricular extension.  Admitted for multidisciplinary rehab program- pt/ot/dvt ppx    # chills/ sweating this am- cxr negative, labs wnl, vitals stable, covid pending, will monitor    #Right thalamic hemorrhage with intraventricular extension  - likely 2/2 uncontrolled HTN, per chart patient non complaint with meds.  - s/p cerebral angiography-->no evidence of AVM; last CT on 3/9 was stable  - per neuro, MRI in 4-6 weeks to r/o vascular malformations      #DM2- Accu-Cheks noted-  lantus d/cd 3/24/21, decrease metformin to 850 bid 3/26/21, reassess in am  - A1C with Estimated Average Glucose Result: 8.7% (03.08.21 @ 05:08)    # hyponatremia likely SIADH- improved, monitor - stop hctz, d/c salt tabs, c/w fluid restriction for now,  reassess     # hypokalemia monitor, replete prn,  hctz was d/c    #HTN- noted low this am  - decreased Clonidine and  Nifedipine 3/24/21, c/w Losartan   -  hctz was d/cd due to hyponatremia and hypokalemia  - mag normal 3/15/21    #HLD  - c/w Atorvastatin    #LBP  - CT lumbar spine negative for acute pathology    #Smoking history  - c/w Nicotine patch daily  - advised cessation    #insomnia- Melatonin PRN    #DVT prophylaxis- Lovenox     will follow  d/w dr. ness

## 2021-03-26 NOTE — PROGRESS NOTE ADULT - SUBJECTIVE AND OBJECTIVE BOX
DAILY PROGRESS NOTE:  HPI:  PENNIE GIANG is a 56M with PMHx of HTN (non-compliant with medications) who presented to [facility] on 2021 with HA and imbalance s/p fall on 3/6. SBP elevated on admisison >220, thus started on nicardipine gtt; CT head findings c/w right thalamic hemorrhage with intraventricular hemorrhage. Cerebral angiography on 3/8 did not show evidence of AVM. Nicardipine weaned off and started on Clonidine, HCTZ, Nifedipine, and Losartan. Repeat CT head on 3/9 with stable findings. Plan for MRI brain in 4-6 weeks to rule out underlying vascular malformation.    Patient was evaluated by PM&R and therapy for functional deficits and gait/ ADL impairments and recommended acute rehabilitation. Patient was medically optimized for discharge to [facility] on 2021.    On admission, patient noted to have temp 99.6F oral, 101F rectal. Patient denies feeling febrile, chills, SOB, dysuria, abd pain. COVID-19 PCR, UA and CXR ordered.  (13 Mar 2021 12:23)      Subjective:  Patient was seen and examined at the bedside this AM; [email or web address] ID [id number] provided translations. No acute overnight events. No chills this morning or overnight. Denied LBP, numbness, or tingling.    ROS:  Denied fever, chills, nausea, vomiting, CP, palpitations, coughing, wheezing, SOB, or abdominal pain.      Physical Exam:  Vital Signs Last 24 Hrs  T(C): 36.8 (26 Mar 2021 08:58), Max: 37.3 (25 Mar 2021 21:01)  T(F): 98.2 (26 Mar 2021 08:58), Max: 99.2 (25 Mar 2021 21:01)  HR: 79 (26 Mar 2021 08:58) (79 - 100)  BP: 131/90 (26 Mar 2021 08:58) (125/82 - 132/82)  RR: 16 (26 Mar 2021 08:58) (16 - 16)  SpO2: 100% (26 Mar 2021 08:58) (97% - 100%)      Gen - NAD, Comfortable  Pulm - CTAB,  Cardiovascular - S1S2  Abdomen - Soft, NT/ND, +BS  Extremities - no edema or calf tenderness  Neuro-     awake and alert     Fluent, No dysarthria     Cranial Nerves - no facial asymmetry     Motor - 5/5   Psychiatric - Mood stable, Affect WNL      Recent Labs/Imagin.9   7.34  )-----------( 261      ( 25 Mar 2021 06:26 )             41.3         140  |  102  |  17  ----------------------------<  114<H>  4.8   |  33<H>  |  0.78    Ca    9.5      25 Mar 2021 06:26    Urinalysis Basic - ( 25 Mar 2021 16:10 )  Color: Yellow / Appearance: Clear / S.025 / pH: x  Gluc: x / Ketone: Negative  / Bili: Negative / Urobili: Negative   Blood: x / Protein: Negative / Nitrite: Negative   Leuk Esterase: Negative / RBC: x / WBC x   Sq Epi: x / Non Sq Epi: x / Bacteria: x    POCT Blood Glucose.: 111 mg/dL (21 @ 07:52)  POCT Blood Glucose.: 120 mg/dL (21 @ 16:36)      Medications:  acetaminophen   Tablet .. 650 milliGRAM(s) Oral every 6 hours PRN  aluminum hydroxide/magnesium hydroxide/simethicone Suspension 30 milliLiter(s) Oral every 4 hours PRN  atorvastatin 80 milliGRAM(s) Oral at bedtime  bisacodyl 5 milliGRAM(s) Oral every 12 hours PRN  cloNIDine 0.1 milliGRAM(s) Oral two times a day  dextrose 40% Gel 15 Gram(s) Oral once  dextrose 5%. 1000 milliLiter(s) IV Continuous <Continuous>  dextrose 5%. 1000 milliLiter(s) IV Continuous <Continuous>  dextrose 50% Injectable 25 Gram(s) IV Push once  dextrose 50% Injectable 12.5 Gram(s) IV Push once  dextrose 50% Injectable 25 Gram(s) IV Push once  enoxaparin Injectable 40 milliGRAM(s) SubCutaneous <User Schedule>  folic acid 1 milliGRAM(s) Oral daily  gabapentin 300 milliGRAM(s) Oral at bedtime  glucagon  Injectable 1 milliGRAM(s) IntraMuscular once  influenza   Vaccine 0.5 milliLiter(s) IntraMuscular once  insulin lispro (ADMELOG) corrective regimen sliding scale   SubCutaneous two times a day with meals  lactobacillus acidophilus 1 Tablet(s) Oral daily  lidocaine   Patch 1 Patch Transdermal <User Schedule>  lidocaine 4% Topical Solution 1 Application(s) Topical <User Schedule>  losartan 50 milliGRAM(s) Oral daily  melatonin 6 milliGRAM(s) Oral at bedtime  metFORMIN 1000 milliGRAM(s) Oral two times a day  multivitamin 1 Tablet(s) Oral daily  nicotine -  14 mG/24Hr(s) Patch 1 patch Transdermal daily  NIFEdipine XL 30 milliGRAM(s) Oral <User Schedule>  polyethylene glycol 3350 17 Gram(s) Oral two times a day  senna 2 Tablet(s) Oral at bedtime  thiamine 100 milliGRAM(s) Oral daily

## 2021-03-26 NOTE — PROGRESS NOTE ADULT - ASSESSMENT
PENNIE GIANG is a 56M with PMHx of HTN (non-compliant with medications) who presented to [facility] on 03/07/2021 with HA and imbalance s/p fall on 3/6, admission SBP >220, found to have right thalamic hemorrhage with intraventricular extension. Admitted for multidisciplinary rehab program.      #Comprehensive Multidisciplinary Rehab Program:- PT/OT/ SLP 3 hours a day 5 days a week    #Right thalamic hemorrhage with intraventricular extension:- likely 2/2 uncontrolled HTN  - s/p cerebral angiography-->no evidence of AVM; last CT on 3/9 was stable  - per neuro, MRI in 4-6 weeks to r/o vascular malformations  - BP control, as below    #LBP:- Improved   - c/w Gabapentin to 300mg qhs  - tramadol prn   - Lidocaine patch daily with Lidocaine gel for low back at night    #Chills/cold sweats; RESOLVED  - no fever overnight; WBC WNL  - rectal temperature WNL  - ECG and COVID PCR    #Hyponatremia: improved; Na now WNL   - most likely SIADH - reduce salt tabs to once daily   Continue fluid restriction     HTN:- c/w Clonidine 0.1mg bid,( dose reduced 3/24)  Nifedipine  XL 30mg daily, and Losartan 50mg daily,    HLD:- c/w Atorvastatin 80mg qhs    Smoking history:- c/w Nicotine patch daily    #Sleep:- Melatonin PRN    #GI/Bowel:on senna and miralax. Add dulcolax . MOM today if no BM . Increase miralax to bid     #/Bladder:- Toileting schedule q4h    #Diet :- Diet: DASH/TLC and carb consistent  - Nutrition to follow    #Skin/ Pressure Injury Prevention: Turn Q2hrs in bed while awake, OOB to Chair, PT/OT    #DVT prophylaxis:  - Lovenox 40mg daily  - SCDs    Fever:-at admission, - s/p J&J vaccine prior to admission; ID w/u neg    Dispo: Team conference 3/23  Progress limited last week due to back pain. Would benefit from additional days of rehab   -- TDD: changed to 3/31 based on insurance auth   Family training to be coordinated

## 2021-03-27 LAB
ANION GAP SERPL CALC-SCNC: 6 MMOL/L — SIGNIFICANT CHANGE UP (ref 5–17)
BUN SERPL-MCNC: 13 MG/DL — SIGNIFICANT CHANGE UP (ref 7–23)
CALCIUM SERPL-MCNC: 9.4 MG/DL — SIGNIFICANT CHANGE UP (ref 8.4–10.5)
CHLORIDE SERPL-SCNC: 104 MMOL/L — SIGNIFICANT CHANGE UP (ref 96–108)
CO2 SERPL-SCNC: 31 MMOL/L — SIGNIFICANT CHANGE UP (ref 22–31)
CREAT SERPL-MCNC: 0.82 MG/DL — SIGNIFICANT CHANGE UP (ref 0.5–1.3)
GLUCOSE BLDC GLUCOMTR-MCNC: 103 MG/DL — HIGH (ref 70–99)
GLUCOSE BLDC GLUCOMTR-MCNC: 116 MG/DL — HIGH (ref 70–99)
GLUCOSE SERPL-MCNC: 107 MG/DL — HIGH (ref 70–99)
POTASSIUM SERPL-MCNC: 4.6 MMOL/L — SIGNIFICANT CHANGE UP (ref 3.5–5.3)
POTASSIUM SERPL-SCNC: 4.6 MMOL/L — SIGNIFICANT CHANGE UP (ref 3.5–5.3)
SODIUM SERPL-SCNC: 141 MMOL/L — SIGNIFICANT CHANGE UP (ref 135–145)

## 2021-03-27 PROCEDURE — 99232 SBSQ HOSP IP/OBS MODERATE 35: CPT

## 2021-03-27 RX ADMIN — LOSARTAN POTASSIUM 50 MILLIGRAM(S): 100 TABLET, FILM COATED ORAL at 05:49

## 2021-03-27 RX ADMIN — Medication 100 MILLIGRAM(S): at 12:57

## 2021-03-27 RX ADMIN — Medication 1 PATCH: at 08:11

## 2021-03-27 RX ADMIN — Medication 0.1 MILLIGRAM(S): at 17:39

## 2021-03-27 RX ADMIN — SENNA PLUS 2 TABLET(S): 8.6 TABLET ORAL at 21:34

## 2021-03-27 RX ADMIN — Medication 0.1 MILLIGRAM(S): at 05:49

## 2021-03-27 RX ADMIN — Medication 1 TABLET(S): at 12:55

## 2021-03-27 RX ADMIN — Medication 1 PATCH: at 12:56

## 2021-03-27 RX ADMIN — Medication 1 MILLIGRAM(S): at 12:55

## 2021-03-27 RX ADMIN — Medication 1 PATCH: at 19:46

## 2021-03-27 RX ADMIN — Medication 1 TABLET(S): at 17:40

## 2021-03-27 RX ADMIN — ENOXAPARIN SODIUM 40 MILLIGRAM(S): 100 INJECTION SUBCUTANEOUS at 17:40

## 2021-03-27 RX ADMIN — GABAPENTIN 300 MILLIGRAM(S): 400 CAPSULE ORAL at 21:35

## 2021-03-27 RX ADMIN — POLYETHYLENE GLYCOL 3350 17 GRAM(S): 17 POWDER, FOR SOLUTION ORAL at 05:49

## 2021-03-27 RX ADMIN — METFORMIN HYDROCHLORIDE 850 MILLIGRAM(S): 850 TABLET ORAL at 08:12

## 2021-03-27 RX ADMIN — Medication 1 PATCH: at 12:58

## 2021-03-27 RX ADMIN — METFORMIN HYDROCHLORIDE 850 MILLIGRAM(S): 850 TABLET ORAL at 17:40

## 2021-03-27 RX ADMIN — Medication 30 MILLIGRAM(S): at 08:32

## 2021-03-27 RX ADMIN — LIDOCAINE 1 APPLICATION(S): 4 CREAM TOPICAL at 21:35

## 2021-03-27 RX ADMIN — ATORVASTATIN CALCIUM 80 MILLIGRAM(S): 80 TABLET, FILM COATED ORAL at 21:34

## 2021-03-27 RX ADMIN — Medication 6 MILLIGRAM(S): at 21:35

## 2021-03-27 NOTE — PROGRESS NOTE ADULT - SUBJECTIVE AND OBJECTIVE BOX
HPI:  PENNIE GIANG is a 56M with PMHx of HTN (non-compliant with medications) who presented to [facility] on 2021 with HA and imbalance s/p fall on 3/6. SBP elevated on admisison >220, thus started on nicardipine gtt; CT head findings c/w right thalamic hemorrhage with intraventricular hemorrhage. Cerebral angiography on 3/8 did not show evidence of AVM. Nicardipine weaned off and started on Clonidine, HCTZ, Nifedipine, and Losartan. Repeat CT head on 3/9 with stable findings. Plan for MRI brain in 4-6 weeks to rule out underlying vascular malformation.    Patient was evaluated by PM&R and therapy for functional deficits and gait/ ADL impairments and recommended acute rehabilitation. Patient was medically optimized for discharge to [facility] on 2021.    On admission, patient noted to have temp 99.6F oral, 101F rectal. Patient denies feeling febrile, chills, SOB, dysuria, abd pain. COVID-19 PCR, UA and CXR ordered.  (13 Mar 2021 12:23)      Subjective    No new complaints.         PAST MEDICAL & SURGICAL HISTORY:  CAD (coronary artery disease)  SP Stent     HTN (hypertension)    DM (diabetes mellitus)    HLD (hyperlipidemia)    HTN (hypertension)    No significant past surgical history    No significant past surgical history        MedsMEDICATIONS  (STANDING):  atorvastatin 80 milliGRAM(s) Oral at bedtime  cloNIDine 0.1 milliGRAM(s) Oral two times a day  dextrose 40% Gel 15 Gram(s) Oral once  dextrose 5%. 1000 milliLiter(s) (50 mL/Hr) IV Continuous <Continuous>  dextrose 5%. 1000 milliLiter(s) (100 mL/Hr) IV Continuous <Continuous>  dextrose 50% Injectable 25 Gram(s) IV Push once  dextrose 50% Injectable 25 Gram(s) IV Push once  dextrose 50% Injectable 12.5 Gram(s) IV Push once  enoxaparin Injectable 40 milliGRAM(s) SubCutaneous <User Schedule>  folic acid 1 milliGRAM(s) Oral daily  gabapentin 300 milliGRAM(s) Oral at bedtime  glucagon  Injectable 1 milliGRAM(s) IntraMuscular once  influenza   Vaccine 0.5 milliLiter(s) IntraMuscular once  insulin lispro (ADMELOG) corrective regimen sliding scale   SubCutaneous two times a day with meals  lactobacillus acidophilus 1 Tablet(s) Oral daily  lidocaine   Patch 1 Patch Transdermal <User Schedule>  lidocaine 4% Topical Solution 1 Application(s) Topical <User Schedule>  losartan 50 milliGRAM(s) Oral daily  melatonin 6 milliGRAM(s) Oral at bedtime  metFORMIN 850 milliGRAM(s) Oral two times a day with meals  multivitamin 1 Tablet(s) Oral daily  nicotine -  14 mG/24Hr(s) Patch 1 patch Transdermal daily  NIFEdipine XL 30 milliGRAM(s) Oral <User Schedule>  polyethylene glycol 3350 17 Gram(s) Oral two times a day  senna 2 Tablet(s) Oral at bedtime  thiamine 100 milliGRAM(s) Oral daily    MEDICATIONS  (PRN):  acetaminophen   Tablet .. 650 milliGRAM(s) Oral every 6 hours PRN Temp greater or equal to 38C (100.4F), Mild Pain (1 - 3)  aluminum hydroxide/magnesium hydroxide/simethicone Suspension 30 milliLiter(s) Oral every 4 hours PRN Dyspepsia  bisacodyl 5 milliGRAM(s) Oral every 12 hours PRN Constipation      Vital Signs Last 24 Hrs  T(C): 37.2 (26 Mar 2021 20:17), Max: 37.2 (26 Mar 2021 20:17)  T(F): 98.9 (26 Mar 2021 20:17), Max: 98.9 (26 Mar 2021 20:17)  HR: 85 (27 Mar 2021 05:46) (85 - 106)  BP: 127/92 (27 Mar 2021 05:46) (127/92 - 145/104)  BP(mean): --  RR: 15 (26 Mar 2021 22:15) (15 - 16)  SpO2: 98% (26 Mar 2021 22:15) (98% - 100%)  I&O's Summary    26 Mar 2021 07:01  -  27 Mar 2021 07:00  --------------------------------------------------------  IN: 0 mL / OUT: 425 mL / NET: -425 mL        PHYSICAL EXAM:  GENERAL: NAD  NECK: Supple  NERVOUS SYSTEM:  awake and alert  HEART: S1s2 NL , RRR  CHEST/LUNG: Clear to percussion bilaterally  ABDOMEN: Soft, Nontender, Nondistended; Bowel sounds present  EXTREMITIES:  No edema      LABS:      141  |  104  |  13  ----------------------------<  107<H>  4.6   |  31  |  0.82    Ca    9.4      27 Mar 2021 05:00            RVP:          Tox:         Urinalysis Basic - ( 25 Mar 2021 16:10 )    Color: Yellow / Appearance: Clear / S.025 / pH: x  Gluc: x / Ketone: Negative  / Bili: Negative / Urobili: Negative   Blood: x / Protein: Negative / Nitrite: Negative   Leuk Esterase: Negative / RBC: x / WBC x   Sq Epi: x / Non Sq Epi: x / Bacteria: x      CAPILLARY BLOOD GLUCOSE      POCT Blood Glucose.: 116 mg/dL (27 Mar 2021 07:43)  POCT Blood Glucose.: 119 mg/dL (26 Mar 2021 17:18)      Imaging Personally Reviewed:  [ ] YES  [ ] NO        Care Discussed with Consultants/Other Providers [ x] YES  [ ] NO

## 2021-03-27 NOTE — PROGRESS NOTE ADULT - SUBJECTIVE AND OBJECTIVE BOX
Cc: Gait dysfunction    HPI: Patient with no new medical issues today.  Na 141  No BM despite suppository.   No abdominal pain, nausea, or vomiting.  Pain controlled, no chest pain, no N/V, no Fevers/Chills. No other new ROS  Has been tolerating rehabilitation program.    acetaminophen   Tablet .. 650 milliGRAM(s) Oral every 6 hours PRN  aluminum hydroxide/magnesium hydroxide/simethicone Suspension 30 milliLiter(s) Oral every 4 hours PRN  atorvastatin 80 milliGRAM(s) Oral at bedtime  bisacodyl 5 milliGRAM(s) Oral every 12 hours PRN  cloNIDine 0.1 milliGRAM(s) Oral two times a day  dextrose 40% Gel 15 Gram(s) Oral once  dextrose 5%. 1000 milliLiter(s) IV Continuous <Continuous>  dextrose 5%. 1000 milliLiter(s) IV Continuous <Continuous>  dextrose 50% Injectable 25 Gram(s) IV Push once  dextrose 50% Injectable 25 Gram(s) IV Push once  dextrose 50% Injectable 12.5 Gram(s) IV Push once  enoxaparin Injectable 40 milliGRAM(s) SubCutaneous <User Schedule>  folic acid 1 milliGRAM(s) Oral daily  gabapentin 300 milliGRAM(s) Oral at bedtime  glucagon  Injectable 1 milliGRAM(s) IntraMuscular once  influenza   Vaccine 0.5 milliLiter(s) IntraMuscular once  insulin lispro (ADMELOG) corrective regimen sliding scale   SubCutaneous two times a day with meals  lactobacillus acidophilus 1 Tablet(s) Oral daily  lidocaine   Patch 1 Patch Transdermal <User Schedule>  lidocaine 4% Topical Solution 1 Application(s) Topical <User Schedule>  losartan 50 milliGRAM(s) Oral daily  melatonin 6 milliGRAM(s) Oral at bedtime  metFORMIN 850 milliGRAM(s) Oral two times a day with meals  multivitamin 1 Tablet(s) Oral daily  nicotine -  14 mG/24Hr(s) Patch 1 patch Transdermal daily  NIFEdipine XL 30 milliGRAM(s) Oral <User Schedule>  polyethylene glycol 3350 17 Gram(s) Oral two times a day  senna 2 Tablet(s) Oral at bedtime  thiamine 100 milliGRAM(s) Oral daily      T(C): 37.2 (03-26-21 @ 20:17), Max: 37.2 (03-26-21 @ 20:17)  HR: 85 (03-27-21 @ 05:46) (85 - 106)  BP: 127/92 (03-27-21 @ 05:46) (127/92 - 145/104)  RR: 15 (03-26-21 @ 22:15) (15 - 16)  SpO2: 98% (03-26-21 @ 22:15) (98% - 100%)    In NAD  HEENT- EOMI  Heart- RRR, S1S2  Lungs- CTA bl.  Abd- + BS, NT, ND  Ext- No calf pain  Neuro- Exam unchanged    Imp: Patient with diagnosis of right ICH admitted for comprehensive acute rehabilitation.    Plan:  - Continue therapies  - Na stable this AM, will rescind fluid restriction per sign out.  - No bowel movements over the last several days despite suppository.  No s/s of obstruction.  Will order enema.  - DVT prophylaxis  - Skin- Turn q2h, check skin daily  - Continue current medications; patient medically stable.   - Patient is stable to continue current rehabilitation program.

## 2021-03-27 NOTE — PROGRESS NOTE ADULT - ASSESSMENT
right thalamic hemorrhage with intraventricular extension  PT/OT per rehab    HTN  Clonidine, Losartan, Nifedipine    DM2, a1c 8.7  Metformin, Lispro    Hyponatremia  Resolved    DVT ppx  Lovenox

## 2021-03-28 LAB
GLUCOSE BLDC GLUCOMTR-MCNC: 108 MG/DL — HIGH (ref 70–99)
GLUCOSE BLDC GLUCOMTR-MCNC: 109 MG/DL — HIGH (ref 70–99)

## 2021-03-28 PROCEDURE — 99232 SBSQ HOSP IP/OBS MODERATE 35: CPT

## 2021-03-28 RX ADMIN — POLYETHYLENE GLYCOL 3350 17 GRAM(S): 17 POWDER, FOR SOLUTION ORAL at 06:06

## 2021-03-28 RX ADMIN — Medication 1 PATCH: at 06:07

## 2021-03-28 RX ADMIN — Medication 0.1 MILLIGRAM(S): at 17:51

## 2021-03-28 RX ADMIN — LIDOCAINE 1 PATCH: 4 CREAM TOPICAL at 08:29

## 2021-03-28 RX ADMIN — Medication 100 MILLIGRAM(S): at 12:40

## 2021-03-28 RX ADMIN — Medication 1 PATCH: at 12:41

## 2021-03-28 RX ADMIN — LIDOCAINE 1 APPLICATION(S): 4 CREAM TOPICAL at 22:25

## 2021-03-28 RX ADMIN — Medication 0.1 MILLIGRAM(S): at 06:06

## 2021-03-28 RX ADMIN — Medication 1 TABLET(S): at 12:40

## 2021-03-28 RX ADMIN — Medication 6 MILLIGRAM(S): at 22:25

## 2021-03-28 RX ADMIN — METFORMIN HYDROCHLORIDE 850 MILLIGRAM(S): 850 TABLET ORAL at 08:29

## 2021-03-28 RX ADMIN — ATORVASTATIN CALCIUM 80 MILLIGRAM(S): 80 TABLET, FILM COATED ORAL at 22:25

## 2021-03-28 RX ADMIN — Medication 1 MILLIGRAM(S): at 12:40

## 2021-03-28 RX ADMIN — GABAPENTIN 300 MILLIGRAM(S): 400 CAPSULE ORAL at 22:25

## 2021-03-28 RX ADMIN — ENOXAPARIN SODIUM 40 MILLIGRAM(S): 100 INJECTION SUBCUTANEOUS at 17:51

## 2021-03-28 RX ADMIN — Medication 1 PATCH: at 20:43

## 2021-03-28 RX ADMIN — METFORMIN HYDROCHLORIDE 850 MILLIGRAM(S): 850 TABLET ORAL at 17:51

## 2021-03-28 RX ADMIN — LIDOCAINE 1 PATCH: 4 CREAM TOPICAL at 20:42

## 2021-03-28 RX ADMIN — Medication 30 MILLIGRAM(S): at 08:32

## 2021-03-28 RX ADMIN — LOSARTAN POTASSIUM 50 MILLIGRAM(S): 100 TABLET, FILM COATED ORAL at 06:06

## 2021-03-28 RX ADMIN — SENNA PLUS 2 TABLET(S): 8.6 TABLET ORAL at 22:25

## 2021-03-28 NOTE — PROGRESS NOTE ADULT - ASSESSMENT
right thalamic hemorrhage with intraventricular extension  PT/OT per rehab    HTN  Clonidine, Losartan, Nifedipine    DM2, a1c 8.7  Metformin, Lispro    HLD  Lipitor    Hyponatremia  Resolved    DVT ppx  Lovenox

## 2021-03-28 NOTE — PROGRESS NOTE ADULT - SUBJECTIVE AND OBJECTIVE BOX
HPI:  PENNIE GIANG is a 56M with PMHx of HTN (non-compliant with medications) who presented to [facility] on 03/07/2021 with HA and imbalance s/p fall on 3/6. SBP elevated on admisison >220, thus started on nicardipine gtt; CT head findings c/w right thalamic hemorrhage with intraventricular hemorrhage. Cerebral angiography on 3/8 did not show evidence of AVM. Nicardipine weaned off and started on Clonidine, HCTZ, Nifedipine, and Losartan. Repeat CT head on 3/9 with stable findings. Plan for MRI brain in 4-6 weeks to rule out underlying vascular malformation.    Patient was evaluated by PM&R and therapy for functional deficits and gait/ ADL impairments and recommended acute rehabilitation. Patient was medically optimized for discharge to [facility] on 03/12/2021.    On admission, patient noted to have temp 99.6F oral, 101F rectal. Patient denies feeling febrile, chills, SOB, dysuria, abd pain. COVID-19 PCR, UA and CXR ordered.  (13 Mar 2021 12:23)      Subjective    No new complaints.       PAST MEDICAL & SURGICAL HISTORY:  CAD (coronary artery disease)  SP Stent 2019    HTN (hypertension)    DM (diabetes mellitus)    HLD (hyperlipidemia)    HTN (hypertension)    No significant past surgical history    No significant past surgical history        MedsMEDICATIONS  (STANDING):  atorvastatin 80 milliGRAM(s) Oral at bedtime  cloNIDine 0.1 milliGRAM(s) Oral two times a day  dextrose 40% Gel 15 Gram(s) Oral once  dextrose 5%. 1000 milliLiter(s) (50 mL/Hr) IV Continuous <Continuous>  dextrose 5%. 1000 milliLiter(s) (100 mL/Hr) IV Continuous <Continuous>  dextrose 50% Injectable 25 Gram(s) IV Push once  dextrose 50% Injectable 25 Gram(s) IV Push once  dextrose 50% Injectable 12.5 Gram(s) IV Push once  enoxaparin Injectable 40 milliGRAM(s) SubCutaneous <User Schedule>  folic acid 1 milliGRAM(s) Oral daily  gabapentin 300 milliGRAM(s) Oral at bedtime  glucagon  Injectable 1 milliGRAM(s) IntraMuscular once  influenza   Vaccine 0.5 milliLiter(s) IntraMuscular once  insulin lispro (ADMELOG) corrective regimen sliding scale   SubCutaneous two times a day with meals  lactobacillus acidophilus 1 Tablet(s) Oral daily  lidocaine   Patch 1 Patch Transdermal <User Schedule>  lidocaine 4% Topical Solution 1 Application(s) Topical <User Schedule>  losartan 50 milliGRAM(s) Oral daily  melatonin 6 milliGRAM(s) Oral at bedtime  metFORMIN 850 milliGRAM(s) Oral two times a day with meals  multivitamin 1 Tablet(s) Oral daily  nicotine -  14 mG/24Hr(s) Patch 1 patch Transdermal daily  NIFEdipine XL 30 milliGRAM(s) Oral <User Schedule>  polyethylene glycol 3350 17 Gram(s) Oral two times a day  senna 2 Tablet(s) Oral at bedtime  thiamine 100 milliGRAM(s) Oral daily    MEDICATIONS  (PRN):  acetaminophen   Tablet .. 650 milliGRAM(s) Oral every 6 hours PRN Temp greater or equal to 38C (100.4F), Mild Pain (1 - 3)  aluminum hydroxide/magnesium hydroxide/simethicone Suspension 30 milliLiter(s) Oral every 4 hours PRN Dyspepsia  bisacodyl 5 milliGRAM(s) Oral every 12 hours PRN Constipation      Vital Signs Last 24 Hrs  T(C): 37.1 (28 Mar 2021 07:41), Max: 37.1 (28 Mar 2021 07:41)  T(F): 98.8 (28 Mar 2021 07:41), Max: 98.8 (28 Mar 2021 07:41)  HR: 84 (28 Mar 2021 07:41) (84 - 95)  BP: 114/84 (28 Mar 2021 07:41) (114/84 - 128/87)  BP(mean): --  RR: 15 (28 Mar 2021 07:41) (15 - 15)  SpO2: 97% (28 Mar 2021 07:41) (96% - 97%)  I&O's Summary      PHYSICAL EXAM:  GENERAL: NAD  NECK: Supple  NERVOUS SYSTEM:  awake and alert  HEART: S1s2 NL , RRR  CHEST/LUNG: Clear to percussion bilaterally  ABDOMEN: Soft, Nontender, Nondistended; Bowel sounds present  EXTREMITIES:  No edema      LABS:  03-27    141  |  104  |  13  ----------------------------<  107<H>  4.6   |  31  |  0.82    Ca    9.4      27 Mar 2021 05:00            RVP:          Tox:           CAPILLARY BLOOD GLUCOSE      POCT Blood Glucose.: 108 mg/dL (28 Mar 2021 08:08)  POCT Blood Glucose.: 103 mg/dL (27 Mar 2021 17:21)      Imaging Personally Reviewed:  [ ] YES  [ ] NO        Care Discussed with Consultants/Other Providers [ x] YES  [ ] NO

## 2021-03-28 NOTE — PROGRESS NOTE ADULT - SUBJECTIVE AND OBJECTIVE BOX
Cc: Gait dysfunction    HPI: Patient with no new medical issues today.  No BM despite suppository, bowel regimen.   No abdominal pain, nausea, or vomiting.  Pain controlled, no chest pain, no N/V, no Fevers/Chills. No other new ROS  Has been tolerating rehabilitation program.    acetaminophen   Tablet .. 650 milliGRAM(s) Oral every 6 hours PRN  aluminum hydroxide/magnesium hydroxide/simethicone Suspension 30 milliLiter(s) Oral every 4 hours PRN  atorvastatin 80 milliGRAM(s) Oral at bedtime  bisacodyl 5 milliGRAM(s) Oral every 12 hours PRN  cloNIDine 0.1 milliGRAM(s) Oral two times a day  dextrose 40% Gel 15 Gram(s) Oral once  dextrose 5%. 1000 milliLiter(s) IV Continuous <Continuous>  dextrose 5%. 1000 milliLiter(s) IV Continuous <Continuous>  dextrose 50% Injectable 25 Gram(s) IV Push once  dextrose 50% Injectable 25 Gram(s) IV Push once  dextrose 50% Injectable 12.5 Gram(s) IV Push once  enoxaparin Injectable 40 milliGRAM(s) SubCutaneous <User Schedule>  folic acid 1 milliGRAM(s) Oral daily  gabapentin 300 milliGRAM(s) Oral at bedtime  glucagon  Injectable 1 milliGRAM(s) IntraMuscular once  influenza   Vaccine 0.5 milliLiter(s) IntraMuscular once  insulin lispro (ADMELOG) corrective regimen sliding scale   SubCutaneous two times a day with meals  lactobacillus acidophilus 1 Tablet(s) Oral daily  lidocaine   Patch 1 Patch Transdermal <User Schedule>  lidocaine 4% Topical Solution 1 Application(s) Topical <User Schedule>  losartan 50 milliGRAM(s) Oral daily  melatonin 6 milliGRAM(s) Oral at bedtime  metFORMIN 850 milliGRAM(s) Oral two times a day with meals  multivitamin 1 Tablet(s) Oral daily  nicotine -  14 mG/24Hr(s) Patch 1 patch Transdermal daily  NIFEdipine XL 30 milliGRAM(s) Oral <User Schedule>  polyethylene glycol 3350 17 Gram(s) Oral two times a day  senna 2 Tablet(s) Oral at bedtime  thiamine 100 milliGRAM(s) Oral daily      T(C): 37.2 (03-26-21 @ 20:17), Max: 37.2 (03-26-21 @ 20:17)  HR: 85 (03-27-21 @ 05:46) (85 - 106)  BP: 127/92 (03-27-21 @ 05:46) (127/92 - 145/104)  RR: 15 (03-26-21 @ 22:15) (15 - 16)  SpO2: 98% (03-26-21 @ 22:15) (98% - 100%)    In NAD  HEENT- EOMI  Heart- RRR, S1S2  Lungs- breathing well on RA.  Abd- + BS, NT, ND  Ext- No calf pain  Neuro- Exam unchanged    Imp: Patient with diagnosis of right ICH admitted for comprehensive acute rehabilitation.    Plan:  - Continue therapies  - No bowel movements over the last several days despite suppository and bowel regimen.  No s/s of obstruction.  Enema ordered yesterday.  Will touch base with RN about order.  - DVT prophylaxis  - Skin- Turn q2h, check skin daily  - Continue current medications; patient medically stable.   - Patient is stable to continue current rehabilitation program.

## 2021-03-29 LAB
ANION GAP SERPL CALC-SCNC: 10 MMOL/L — SIGNIFICANT CHANGE UP (ref 5–17)
BUN SERPL-MCNC: 17 MG/DL — SIGNIFICANT CHANGE UP (ref 7–23)
CALCIUM SERPL-MCNC: 9.6 MG/DL — SIGNIFICANT CHANGE UP (ref 8.4–10.5)
CHLORIDE SERPL-SCNC: 102 MMOL/L — SIGNIFICANT CHANGE UP (ref 96–108)
CO2 SERPL-SCNC: 27 MMOL/L — SIGNIFICANT CHANGE UP (ref 22–31)
CREAT SERPL-MCNC: 0.8 MG/DL — SIGNIFICANT CHANGE UP (ref 0.5–1.3)
GLUCOSE BLDC GLUCOMTR-MCNC: 106 MG/DL — HIGH (ref 70–99)
GLUCOSE BLDC GLUCOMTR-MCNC: 122 MG/DL — HIGH (ref 70–99)
GLUCOSE BLDC GLUCOMTR-MCNC: 130 MG/DL — HIGH (ref 70–99)
GLUCOSE SERPL-MCNC: 132 MG/DL — HIGH (ref 70–99)
HCT VFR BLD CALC: 40.1 % — SIGNIFICANT CHANGE UP (ref 39–50)
HGB BLD-MCNC: 13.7 G/DL — SIGNIFICANT CHANGE UP (ref 13–17)
MCHC RBC-ENTMCNC: 30.9 PG — SIGNIFICANT CHANGE UP (ref 27–34)
MCHC RBC-ENTMCNC: 34.2 GM/DL — SIGNIFICANT CHANGE UP (ref 32–36)
MCV RBC AUTO: 90.3 FL — SIGNIFICANT CHANGE UP (ref 80–100)
NRBC # BLD: 0 /100 WBCS — SIGNIFICANT CHANGE UP (ref 0–0)
PLATELET # BLD AUTO: 228 K/UL — SIGNIFICANT CHANGE UP (ref 150–400)
POTASSIUM SERPL-MCNC: 4.3 MMOL/L — SIGNIFICANT CHANGE UP (ref 3.5–5.3)
POTASSIUM SERPL-SCNC: 4.3 MMOL/L — SIGNIFICANT CHANGE UP (ref 3.5–5.3)
RBC # BLD: 4.44 M/UL — SIGNIFICANT CHANGE UP (ref 4.2–5.8)
RBC # FLD: 12 % — SIGNIFICANT CHANGE UP (ref 10.3–14.5)
SODIUM SERPL-SCNC: 139 MMOL/L — SIGNIFICANT CHANGE UP (ref 135–145)
WBC # BLD: 7.43 K/UL — SIGNIFICANT CHANGE UP (ref 3.8–10.5)
WBC # FLD AUTO: 7.43 K/UL — SIGNIFICANT CHANGE UP (ref 3.8–10.5)

## 2021-03-29 PROCEDURE — 99232 SBSQ HOSP IP/OBS MODERATE 35: CPT | Mod: GC

## 2021-03-29 PROCEDURE — 99232 SBSQ HOSP IP/OBS MODERATE 35: CPT

## 2021-03-29 RX ADMIN — LIDOCAINE 1 PATCH: 4 CREAM TOPICAL at 08:28

## 2021-03-29 RX ADMIN — Medication 1 TABLET(S): at 12:54

## 2021-03-29 RX ADMIN — Medication 1 PATCH: at 06:52

## 2021-03-29 RX ADMIN — LIDOCAINE 1 PATCH: 4 CREAM TOPICAL at 19:23

## 2021-03-29 RX ADMIN — GABAPENTIN 300 MILLIGRAM(S): 400 CAPSULE ORAL at 21:45

## 2021-03-29 RX ADMIN — Medication 30 MILLIGRAM(S): at 08:28

## 2021-03-29 RX ADMIN — Medication 1 PATCH: at 12:52

## 2021-03-29 RX ADMIN — LIDOCAINE 1 APPLICATION(S): 4 CREAM TOPICAL at 21:46

## 2021-03-29 RX ADMIN — SENNA PLUS 2 TABLET(S): 8.6 TABLET ORAL at 21:45

## 2021-03-29 RX ADMIN — Medication 6 MILLIGRAM(S): at 21:45

## 2021-03-29 RX ADMIN — Medication 0.1 MILLIGRAM(S): at 05:33

## 2021-03-29 RX ADMIN — ATORVASTATIN CALCIUM 80 MILLIGRAM(S): 80 TABLET, FILM COATED ORAL at 21:45

## 2021-03-29 RX ADMIN — METFORMIN HYDROCHLORIDE 850 MILLIGRAM(S): 850 TABLET ORAL at 08:28

## 2021-03-29 RX ADMIN — METFORMIN HYDROCHLORIDE 850 MILLIGRAM(S): 850 TABLET ORAL at 17:13

## 2021-03-29 RX ADMIN — Medication 100 MILLIGRAM(S): at 12:54

## 2021-03-29 RX ADMIN — Medication 0.1 MILLIGRAM(S): at 17:13

## 2021-03-29 RX ADMIN — LIDOCAINE 1 PATCH: 4 CREAM TOPICAL at 21:45

## 2021-03-29 RX ADMIN — LOSARTAN POTASSIUM 50 MILLIGRAM(S): 100 TABLET, FILM COATED ORAL at 05:33

## 2021-03-29 RX ADMIN — Medication 1 PATCH: at 19:23

## 2021-03-29 RX ADMIN — Medication 1 PATCH: at 17:14

## 2021-03-29 RX ADMIN — Medication 1 MILLIGRAM(S): at 12:54

## 2021-03-29 RX ADMIN — ENOXAPARIN SODIUM 40 MILLIGRAM(S): 100 INJECTION SUBCUTANEOUS at 17:14

## 2021-03-29 NOTE — PROGRESS NOTE ADULT - ATTENDING COMMENTS
Patient seen at bedside  States no new issues over weekend  BMP on 3/27- Sodium normal ,Fluid restriction dc. Repeat BMP today  WNL    2. BP stable on current medications    3. Family training today.  Continue rehab program  TDD 3/31/21- home

## 2021-03-29 NOTE — PROGRESS NOTE ADULT - SUBJECTIVE AND OBJECTIVE BOX
Patient is a 56y old  Male who presents with a chief complaint of 1.1 Right thalamic hemorrhage with IVH (29 Mar 2021 11:21)      24 HOUR EVENTS:  No overnight events reported.     SUBJECTIVE:  Patient seen and examined at bedside. Feels like he has left arm weakness.     ALLERGIES:  No Known Allergies    MEDICATIONS  (STANDING):  atorvastatin 80 milliGRAM(s) Oral at bedtime  cloNIDine 0.1 milliGRAM(s) Oral two times a day  dextrose 40% Gel 15 Gram(s) Oral once  dextrose 5%. 1000 milliLiter(s) (50 mL/Hr) IV Continuous <Continuous>  dextrose 5%. 1000 milliLiter(s) (100 mL/Hr) IV Continuous <Continuous>  dextrose 50% Injectable 25 Gram(s) IV Push once  dextrose 50% Injectable 25 Gram(s) IV Push once  dextrose 50% Injectable 12.5 Gram(s) IV Push once  enoxaparin Injectable 40 milliGRAM(s) SubCutaneous <User Schedule>  folic acid 1 milliGRAM(s) Oral daily  gabapentin 300 milliGRAM(s) Oral at bedtime  glucagon  Injectable 1 milliGRAM(s) IntraMuscular once  influenza   Vaccine 0.5 milliLiter(s) IntraMuscular once  insulin lispro (ADMELOG) corrective regimen sliding scale   SubCutaneous two times a day with meals  lactobacillus acidophilus 1 Tablet(s) Oral daily  lidocaine   Patch 1 Patch Transdermal <User Schedule>  lidocaine 4% Topical Solution 1 Application(s) Topical <User Schedule>  losartan 50 milliGRAM(s) Oral daily  melatonin 6 milliGRAM(s) Oral at bedtime  metFORMIN 850 milliGRAM(s) Oral two times a day with meals  multivitamin 1 Tablet(s) Oral daily  nicotine -  14 mG/24Hr(s) Patch 1 patch Transdermal daily  NIFEdipine XL 30 milliGRAM(s) Oral <User Schedule>  polyethylene glycol 3350 17 Gram(s) Oral two times a day  senna 2 Tablet(s) Oral at bedtime  thiamine 100 milliGRAM(s) Oral daily    MEDICATIONS  (PRN):  acetaminophen   Tablet .. 650 milliGRAM(s) Oral every 6 hours PRN Temp greater or equal to 38C (100.4F), Mild Pain (1 - 3)  aluminum hydroxide/magnesium hydroxide/simethicone Suspension 30 milliLiter(s) Oral every 4 hours PRN Dyspepsia  bisacodyl 5 milliGRAM(s) Oral every 12 hours PRN Constipation    Vital Signs Last 24 Hrs  T(F): 98.2 (29 Mar 2021 20:17), Max: 99.1 (29 Mar 2021 07:54)  HR: 101 (29 Mar 2021 20:17) (76 - 102)  BP: 156/80 (29 Mar 2021 20:17) (118/87 - 156/80)  RR: 16 (29 Mar 2021 20:17) (16 - 16)  SpO2: 97% (29 Mar 2021 20:17) (97% - 98%)  I&O's Summary    29 Mar 2021 07:01  -  29 Mar 2021 20:34  --------------------------------------------------------  IN: 0 mL / OUT: 805 mL / NET: -805 mL      PHYSICAL EXAM:  General: NAD, A/O x 3  ENT: Moist mucous membranes, no thrush  Neck: Supple, No JVD  Lungs: Clear to auscultation bilaterally, good air entry, non-labored breathing  Cardio: RRR, S1/S2, No murmur  Abdomen: Soft, Nontender, Nondistended; Bowel sounds present  Extremities: No calf tenderness, No pitting edema    LABS:                        13.7   7.43  )-----------( 228      ( 29 Mar 2021 06:50 )             40.1     03-29    139  |  102  |  17  ----------------------------<  132  4.3   |  27  |  0.80    Ca    9.6      29 Mar 2021 06:50          eGFR if Non African American: 100 mL/min/1.73M2 (03-29-21 @ 06:50)  eGFR if African American: 115 mL/min/1.73M2 (03-29-21 @ 06:50)    03-08 Chol 234 mg/dL LDL -- HDL 54 mg/dL Trig 200 mg/dL      POCT Blood Glucose.: 106 mg/dL (29 Mar 2021 16:21)  POCT Blood Glucose.: 122 mg/dL (29 Mar 2021 08:17)          RADIOLOGY & ADDITIONAL TESTS:    Care Discussed with Consultants/Other Providers:   Dr. Armando WOODRUFF

## 2021-03-29 NOTE — PROGRESS NOTE ADULT - ASSESSMENT
56M with PMHx of HTN (non-compliant with medications) who presented to [facility] on 03/07/2021 with HA and imbalance s/p fall on 3/6, admission SBP >220, found to have right thalamic hemorrhage with intraventricular extension.  Admitted for multidisciplinary rehab program- pt/ot/dvt ppx    #Right thalamic hemorrhage with intraventricular extension  - likely 2/2 uncontrolled HTN, per chart patient non complaint with meds.  - s/p cerebral angiography-->no evidence of AVM; last CT on 3/9 was stable  - per neuro, MRI in 4-6 weeks to r/o vascular malformations  - atorvastatin 80 mg     #DM2, uncontrolled  - hgbA1C 8.7% (03.08.21)   - metformin, lispro    #HTN  - aggressive BP control  - continue with losartan  - also on clonidine and nifedipine.  -  hctz reportedly d/c'ed due to hyponatremia    #LBP  - CT lumbar spine negative for acute pathology    #Smoking history  - c/w Nicotine patch daily  - continue to encourage cessation    #insomnia  - Melatonin PRN    # HLD  - statin as above    # hyponatremia likely SIADH- resolved  # Hypokalemia - resolved    DVT ppx: lovenox

## 2021-03-29 NOTE — PROGRESS NOTE ADULT - SUBJECTIVE AND OBJECTIVE BOX
DAILY PROGRESS NOTE:  HPI:  PENNIE GIANG is a 56M with PMHx of HTN (non-compliant with medications) who presented to [facility] on 2021 with HA and imbalance s/p fall on 3/6. SBP elevated on admisison >220, thus started on nicardipine gtt; CT head findings c/w right thalamic hemorrhage with intraventricular hemorrhage. Cerebral angiography on 3/8 did not show evidence of AVM. Nicardipine weaned off and started on Clonidine, HCTZ, Nifedipine, and Losartan. Repeat CT head on 3/9 with stable findings. Plan for MRI brain in 4-6 weeks to rule out underlying vascular malformation.    Patient was evaluated by PM&R and therapy for functional deficits and gait/ ADL impairments and recommended acute rehabilitation. Patient was medically optimized for discharge to [facility] on 2021.    On admission, patient noted to have temp 99.6F oral, 101F rectal. Patient denies feeling febrile, chills, SOB, dysuria, abd pain. COVID-19 PCR, UA and CXR ordered.  (13 Mar 2021 12:23)      Subjective:  Patient was seen and examined at the bedside this AM. No acute overnight events. No issues over the weekend. Patient exercising with resistance bands at the bedside on his own. Caregiver training this afternoon; states he still feels weak. Has not been ambulating to restroom, but brought there by WC. Denied LBP.     ROS:  Denied fever, chills, nausea, vomiting, CP, palpitations, coughing, wheezing, SOB, or abdominal pain. Had 2 BMs yesterday.       Physical Exam:  Vital Signs Last 24 Hrs  T(C): 37.3 (29 Mar 2021 07:54), Max: 37.3 (29 Mar 2021 07:54)  T(F): 99.1 (29 Mar 2021 07:54), Max: 99.1 (29 Mar 2021 07:54)  HR: 76 (29 Mar 2021 07:54) (76 - 102)  BP: 120/85 (29 Mar 2021 07:54) (118/87 - 128/79)  RR: 16 (29 Mar 2021 07:54) (16 - 16)  SpO2: 97% (29 Mar 2021 07:54) (97% - 97%)    03-29-21 @ 07:01  -  21 @ 11:21  --------------------------------------------------------  IN: 0 mL / OUT: 590 mL / NET: -590 mL      Gen - NAD, Comfortable  Pulm - CTAB,  Cardiovascular - S1S2  Abdomen - Soft, NT/ND, +BS  Extremities - no edema or calf tenderness  Neuro-     awake and alert     Fluent, No dysarthria     Cranial Nerves - no facial asymmetry     Motor - 5/5   Psychiatric - Mood stable, Affect WNL      Recent Labs/Imagin.7   7.43  )-----------( 228      ( 29 Mar 2021 06:50 )             40.1         139  |  102  |  17  ----------------------------<  132<H>  4.3   |  27  |  0.80    Ca    9.6      29 Mar 2021 06:50    POCT Blood Glucose.: 122 mg/dL (21 @ 08:17)  POCT Blood Glucose.: 109 mg/dL (21 @ 16:55)      Medications:  acetaminophen   Tablet .. 650 milliGRAM(s) Oral every 6 hours PRN  aluminum hydroxide/magnesium hydroxide/simethicone Suspension 30 milliLiter(s) Oral every 4 hours PRN  atorvastatin 80 milliGRAM(s) Oral at bedtime  bisacodyl 5 milliGRAM(s) Oral every 12 hours PRN  cloNIDine 0.1 milliGRAM(s) Oral two times a day  dextrose 40% Gel 15 Gram(s) Oral once  dextrose 5%. 1000 milliLiter(s) IV Continuous <Continuous>  dextrose 5%. 1000 milliLiter(s) IV Continuous <Continuous>  dextrose 50% Injectable 25 Gram(s) IV Push once  dextrose 50% Injectable 12.5 Gram(s) IV Push once  dextrose 50% Injectable 25 Gram(s) IV Push once  enoxaparin Injectable 40 milliGRAM(s) SubCutaneous <User Schedule>  folic acid 1 milliGRAM(s) Oral daily  gabapentin 300 milliGRAM(s) Oral at bedtime  glucagon  Injectable 1 milliGRAM(s) IntraMuscular once  influenza   Vaccine 0.5 milliLiter(s) IntraMuscular once  insulin lispro (ADMELOG) corrective regimen sliding scale   SubCutaneous two times a day with meals  lactobacillus acidophilus 1 Tablet(s) Oral daily  lidocaine   Patch 1 Patch Transdermal <User Schedule>  lidocaine 4% Topical Solution 1 Application(s) Topical <User Schedule>  losartan 50 milliGRAM(s) Oral daily  melatonin 6 milliGRAM(s) Oral at bedtime  metFORMIN 850 milliGRAM(s) Oral two times a day with meals  multivitamin 1 Tablet(s) Oral daily  nicotine -  14 mG/24Hr(s) Patch 1 patch Transdermal daily  NIFEdipine XL 30 milliGRAM(s) Oral <User Schedule>  polyethylene glycol 3350 17 Gram(s) Oral two times a day  senna 2 Tablet(s) Oral at bedtime  thiamine 100 milliGRAM(s) Oral daily

## 2021-03-29 NOTE — PROGRESS NOTE ADULT - ASSESSMENT
PENNIE GIANG is a 56M with PMHx of HTN (non-compliant with medications) who presented to [facility] on 03/07/2021 with HA and imbalance s/p fall on 3/6, admission SBP >220, found to have right thalamic hemorrhage with intraventricular extension. Admitted for multidisciplinary rehab program.      #Comprehensive Multidisciplinary Rehab Program:- PT/OT/ SLP 3 hours a day 5 days a week    #Right thalamic hemorrhage with intraventricular extension:- likely 2/2 uncontrolled HTN  - s/p cerebral angiography-->no evidence of AVM; last CT on 3/9 was stable  - per neuro, MRI in 4-6 weeks to r/o vascular malformations  - BP control, as below    #LBP:- Improved   - c/w Gabapentin to 300mg qhs  - tramadol prn   - Lidocaine patch daily with Lidocaine gel for low back at night    #Chills/cold sweats; RESOLVED  - no fever overnight; WBC WNL  - rectal temperature WNL  - ECG and COVID PCR    #Hyponatremia: improved; Na now WNL   - most likely SIADH - reduce salt tabs to once daily   Continue fluid restriction     HTN:- c/w Clonidine 0.1mg bid,( dose reduced 3/24)  Nifedipine  XL 30mg daily, and Losartan 50mg daily,    HLD:- c/w Atorvastatin 80mg qhs    Smoking history:- c/w Nicotine patch daily    #Sleep:- Melatonin PRN    #GI/Bowel:on senna and miralax. Add dulcolax . MOM today if no BM . Increase miralax to bid     #/Bladder:- Toileting schedule q4h    #Diet :- Diet: DASH/TLC and carb consistent  - Nutrition to follow    #Skin/ Pressure Injury Prevention: Turn Q2hrs in bed while awake, OOB to Chair, PT/OT    #DVT prophylaxis:  - Lovenox 40mg daily  - SCDs    Fever:-at admission, - s/p J&J vaccine prior to admission; ID w/u neg    Dispo: Team conference 3/23  Progress limited last week due to back pain. Would benefit from additional days of rehab   -- TDD: changed to 3/31 based on insurance auth   Family training this afternoon PENNIE GIANG is a 56M with PMHx of HTN (non-compliant with medications) who presented to [facility] on 03/07/2021 with HA and imbalance s/p fall on 3/6, admission SBP >220, found to have right thalamic hemorrhage with intraventricular extension. Admitted for multidisciplinary rehab program.      #Comprehensive Multidisciplinary Rehab Program:- PT/OT/ SLP 3 hours a day 5 days a week    #Right thalamic hemorrhage with intraventricular extension:- likely 2/2 uncontrolled HTN  - s/p cerebral angiography-->no evidence of AVM; last CT on 3/9 was stable  - per neuro, MRI in 4-6 weeks to r/o vascular malformations  - BP control, as below    #LBP:- Improved   - c/w Gabapentin to 300mg qhs  - tramadol prn   - Lidocaine patch daily with Lidocaine gel for low back at night    #Chills/cold sweats; RESOLVED  - no fever overnight; WBC WNL  - rectal temperature WNL  - ECG and COVID PCR    #Hyponatremia: improved; Na now WNL - off salt tabs and fluid restriction  - most likely SIADH -    HTN:- c/w Clonidine 0.1mg bid,( dose reduced 3/24)  Nifedipine  XL 30mg daily, and Losartan 50mg daily,    HLD:- c/w Atorvastatin 80mg qhs    Smoking history:- c/w Nicotine patch daily    #Sleep:- Melatonin PRN    #GI/Bowel:on senna and miralax. Add dulcolax . MOM today if no BM . Increase miralax to bid     #/Bladder:- Toileting schedule q4h    #Diet :- Diet: DASH/TLC and carb consistent  - Nutrition to follow    #Skin/ Pressure Injury Prevention: Turn Q2hrs in bed while awake, OOB to Chair, PT/OT    #DVT prophylaxis:  - Lovenox 40mg daily  - SCDs    Fever:-at admission, - s/p J&J vaccine prior to admission; ID w/u neg    Dispo: Team conference 3/23  Progress limited last week due to back pain. Would benefit from additional days of rehab   -- TDD: changed to 3/31 based on insurance auth   Family training this afternoon

## 2021-03-30 ENCOUNTER — TRANSCRIPTION ENCOUNTER (OUTPATIENT)
Age: 56
End: 2021-03-30

## 2021-03-30 LAB
GLUCOSE BLDC GLUCOMTR-MCNC: 104 MG/DL — HIGH (ref 70–99)
GLUCOSE BLDC GLUCOMTR-MCNC: 121 MG/DL — HIGH (ref 70–99)
GLUCOSE BLDC GLUCOMTR-MCNC: 124 MG/DL — HIGH (ref 70–99)

## 2021-03-30 PROCEDURE — 99232 SBSQ HOSP IP/OBS MODERATE 35: CPT | Mod: GC

## 2021-03-30 PROCEDURE — 99232 SBSQ HOSP IP/OBS MODERATE 35: CPT

## 2021-03-30 RX ORDER — METFORMIN HYDROCHLORIDE 850 MG/1
1 TABLET ORAL
Qty: 60 | Refills: 0
Start: 2021-03-30 | End: 2021-04-28

## 2021-03-30 RX ORDER — ATORVASTATIN CALCIUM 80 MG/1
1 TABLET, FILM COATED ORAL
Qty: 30 | Refills: 0
Start: 2021-03-30 | End: 2021-04-28

## 2021-03-30 RX ORDER — NICOTINE POLACRILEX 2 MG
1 GUM BUCCAL
Qty: 14 | Refills: 0
Start: 2021-03-30 | End: 2021-04-12

## 2021-03-30 RX ORDER — NICOTINE POLACRILEX 2 MG
1 GUM BUCCAL DAILY
Refills: 0 | Status: DISCONTINUED | OUTPATIENT
Start: 2021-03-30 | End: 2021-03-31

## 2021-03-30 RX ORDER — LOSARTAN POTASSIUM 100 MG/1
1 TABLET, FILM COATED ORAL
Qty: 30 | Refills: 0
Start: 2021-03-30 | End: 2021-04-28

## 2021-03-30 RX ORDER — INSULIN LISPRO 100/ML
0 VIAL (ML) SUBCUTANEOUS
Qty: 0 | Refills: 0 | DISCHARGE

## 2021-03-30 RX ORDER — GABAPENTIN 400 MG/1
1 CAPSULE ORAL
Qty: 30 | Refills: 0
Start: 2021-03-30 | End: 2021-04-28

## 2021-03-30 RX ORDER — ACETAMINOPHEN 500 MG
2 TABLET ORAL
Qty: 0 | Refills: 0 | DISCHARGE
Start: 2021-03-30

## 2021-03-30 RX ORDER — NIFEDIPINE 30 MG
1 TABLET, EXTENDED RELEASE 24 HR ORAL
Qty: 30 | Refills: 0
Start: 2021-03-30 | End: 2021-04-28

## 2021-03-30 RX ADMIN — Medication 1 PATCH: at 12:22

## 2021-03-30 RX ADMIN — Medication 0.1 MILLIGRAM(S): at 06:02

## 2021-03-30 RX ADMIN — METFORMIN HYDROCHLORIDE 850 MILLIGRAM(S): 850 TABLET ORAL at 17:56

## 2021-03-30 RX ADMIN — Medication 6 MILLIGRAM(S): at 21:50

## 2021-03-30 RX ADMIN — Medication 0.1 MILLIGRAM(S): at 17:56

## 2021-03-30 RX ADMIN — SENNA PLUS 2 TABLET(S): 8.6 TABLET ORAL at 21:49

## 2021-03-30 RX ADMIN — METFORMIN HYDROCHLORIDE 850 MILLIGRAM(S): 850 TABLET ORAL at 07:59

## 2021-03-30 RX ADMIN — LIDOCAINE 1 PATCH: 4 CREAM TOPICAL at 07:59

## 2021-03-30 RX ADMIN — LIDOCAINE 1 PATCH: 4 CREAM TOPICAL at 19:49

## 2021-03-30 RX ADMIN — LOSARTAN POTASSIUM 50 MILLIGRAM(S): 100 TABLET, FILM COATED ORAL at 06:02

## 2021-03-30 RX ADMIN — ATORVASTATIN CALCIUM 80 MILLIGRAM(S): 80 TABLET, FILM COATED ORAL at 21:49

## 2021-03-30 RX ADMIN — Medication 1 TABLET(S): at 12:22

## 2021-03-30 RX ADMIN — ENOXAPARIN SODIUM 40 MILLIGRAM(S): 100 INJECTION SUBCUTANEOUS at 17:56

## 2021-03-30 RX ADMIN — Medication 30 MILLIGRAM(S): at 08:01

## 2021-03-30 RX ADMIN — Medication 1 MILLIGRAM(S): at 12:22

## 2021-03-30 RX ADMIN — Medication 1 PATCH: at 08:21

## 2021-03-30 RX ADMIN — GABAPENTIN 300 MILLIGRAM(S): 400 CAPSULE ORAL at 21:50

## 2021-03-30 RX ADMIN — Medication 1 PATCH: at 12:21

## 2021-03-30 RX ADMIN — Medication 1 PATCH: at 19:49

## 2021-03-30 RX ADMIN — LIDOCAINE 1 APPLICATION(S): 4 CREAM TOPICAL at 21:50

## 2021-03-30 RX ADMIN — Medication 100 MILLIGRAM(S): at 12:23

## 2021-03-30 NOTE — DISCHARGE NOTE NURSING/CASE MANAGEMENT/SOCIAL WORK - NSDCDMETYPESERV_GEN_ALL_CORE_FT
Your walker and commode were delivered to your bedside. Your shower chair and grab bars will be delivered to your home. Call [employer] to schedule delivery of these items if you have not done so yet

## 2021-03-30 NOTE — PROGRESS NOTE ADULT - ATTENDING COMMENTS
Patient seen at bedside  No  new issues overnight  Denies back pain   Happy with his progress in therapy and feels ready to go home in am     2. Progress reviewed at team conference today  Has met goals  Family training completed last week  Meds reviewed with patient    Discussed with wife over phone

## 2021-03-30 NOTE — DISCHARGE NOTE NURSING/CASE MANAGEMENT/SOCIAL WORK - PATIENT PORTAL LINK FT
You can access the FollowMyHealth Patient Portal offered by [facility] by registering at the following website: [email or web address]. By joining [employer]’s FollowMyHealth portal, you will also be able to view your health information using other applications (apps) compatible with our system.

## 2021-03-30 NOTE — PROGRESS NOTE ADULT - SUBJECTIVE AND OBJECTIVE BOX
DAILY PROGRESS NOTE:  HPI:  PENNIE GIANG is a 56M with PMHx of HTN (non-compliant with medications) who presented to [facility] on 2021 with HA and imbalance s/p fall on 3/6. SBP elevated on admisison >220, thus started on nicardipine gtt; CT head findings c/w right thalamic hemorrhage with intraventricular hemorrhage. Cerebral angiography on 3/8 did not show evidence of AVM. Nicardipine weaned off and started on Clonidine, HCTZ, Nifedipine, and Losartan. Repeat CT head on 3/9 with stable findings. Plan for MRI brain in 4-6 weeks to rule out underlying vascular malformation.    Patient was evaluated by PM&R and therapy for functional deficits and gait/ ADL impairments and recommended acute rehabilitation. Patient was medically optimized for discharge to [facility] on 2021.    On admission, patient noted to have temp 99.6F oral, 101F rectal. Patient denies feeling febrile, chills, SOB, dysuria, abd pain. COVID-19 PCR, UA and CXR ordered.  (13 Mar 2021 12:23)      Subjective:  Patient was seen and examined at the bedside this AM. No acute overnight events. Reports no back pain, states he no longer needs lidocaine patch or gel. Had trouble falling asleep last night, but otherwise, no other complaints. Reviewed discharge medications with patient; he plans to establish care with his wife's PCP.     ROS:  Denied fever, chills, nausea, vomiting, CP, palpitations, coughing, wheezing, SOB, or abdominal pain. BM today      Physical Exam:  Vital Signs Last 24 Hrs  T(C): 36.9 (30 Mar 2021 07:58), Max: 36.9 (30 Mar 2021 07:58)  T(F): 98.5 (30 Mar 2021 07:58), Max: 98.5 (30 Mar 2021 07:58)  HR: 91 (30 Mar 2021 07:58) (84 - 102)  BP: 114/81 (30 Mar 2021 07:58) (114/81 - 156/80)  RR: 16 (30 Mar 2021 07:58) (16 - 16)  SpO2: 99% (30 Mar 2021 07:58) (97% - 99%)    21 @ 07:01  -  21 @ 07:00  --------------------------------------------------------  IN: 0 mL / OUT: 805 mL / NET: -805 mL      Gen - NAD, Comfortable  Pulm - CTAB,  Cardiovascular - S1S2  Abdomen - Soft, NT/ND, +BS  Extremities - no edema or calf tenderness  Neuro-     awake and alert     Fluent, No dysarthria     Cranial Nerves - no facial asymmetry     Motor - 5/5   Psychiatric - Mood stable, Affect WNL      Recent Labs/Imagin.7   7.43  )-----------( 228      ( 29 Mar 2021 06:50 )             40.1         139  |  102  |  17  ----------------------------<  132<H>  4.3   |  27  |  0.80    Ca    9.6      29 Mar 2021 06:50      POCT Blood Glucose.: 121 mg/dL (21 @ 07:52)  POCT Blood Glucose.: 130 mg/dL (21 @ 21:44)  POCT Blood Glucose.: 106 mg/dL (21 @ 16:21)      Medications:  acetaminophen   Tablet .. 650 milliGRAM(s) Oral every 6 hours PRN  aluminum hydroxide/magnesium hydroxide/simethicone Suspension 30 milliLiter(s) Oral every 4 hours PRN  atorvastatin 80 milliGRAM(s) Oral at bedtime  bisacodyl 5 milliGRAM(s) Oral every 12 hours PRN  cloNIDine 0.1 milliGRAM(s) Oral two times a day  dextrose 40% Gel 15 Gram(s) Oral once  dextrose 5%. 1000 milliLiter(s) IV Continuous <Continuous>  dextrose 5%. 1000 milliLiter(s) IV Continuous <Continuous>  dextrose 50% Injectable 25 Gram(s) IV Push once  dextrose 50% Injectable 25 Gram(s) IV Push once  dextrose 50% Injectable 12.5 Gram(s) IV Push once  enoxaparin Injectable 40 milliGRAM(s) SubCutaneous <User Schedule>  folic acid 1 milliGRAM(s) Oral daily  gabapentin 300 milliGRAM(s) Oral at bedtime  glucagon  Injectable 1 milliGRAM(s) IntraMuscular once  influenza   Vaccine 0.5 milliLiter(s) IntraMuscular once  insulin lispro (ADMELOG) corrective regimen sliding scale   SubCutaneous two times a day with meals  lactobacillus acidophilus 1 Tablet(s) Oral daily  lidocaine   Patch 1 Patch Transdermal <User Schedule>  lidocaine 4% Topical Solution 1 Application(s) Topical <User Schedule>  losartan 50 milliGRAM(s) Oral daily  melatonin 6 milliGRAM(s) Oral at bedtime  metFORMIN 850 milliGRAM(s) Oral two times a day with meals  multivitamin 1 Tablet(s) Oral daily  nicotine -  14 mG/24Hr(s) Patch 1 patch Transdermal daily  NIFEdipine XL 30 milliGRAM(s) Oral <User Schedule>  polyethylene glycol 3350 17 Gram(s) Oral two times a day  senna 2 Tablet(s) Oral at bedtime  thiamine 100 milliGRAM(s) Oral daily

## 2021-03-30 NOTE — PROGRESS NOTE ADULT - ASSESSMENT
56M with PMHx of HTN (non-compliant with medications) who presented to [facility] on 03/07/2021 with HA and imbalance s/p fall on 3/6, admission SBP >220, found to have right thalamic hemorrhage with intraventricular extension.  Admitted for multidisciplinary rehab program- pt/ot/dvt ppx    No new recommendations.     #Right thalamic hemorrhage with intraventricular extension  - likely 2/2 uncontrolled HTN, per chart patient non complaint with meds.  - s/p cerebral angiography-->no evidence of AVM; last CT on 3/9 was stable  - per neuro, MRI in 4-6 weeks to r/o vascular malformations  - atorvastatin 80 mg     #DM2, uncontrolled  - hgbA1C 8.7% (03.08.21)   - metformin, lispro    #HTN  - aggressive BP control  - continue with losartan  - also on clonidine and nifedipine.  -  hctz reportedly d/c'ed due to hyponatremia    #LBP  - CT lumbar spine negative for acute pathology    #Smoking history  - c/w Nicotine patch, wean off as able  - continue to encourage cessation    #insomnia  - Melatonin PRN    # HLD  - statin as above    # hyponatremia likely SIADH- resolved  # Hypokalemia - resolved    DVT ppx: lovenox  Dispo: anticipated discharge home tomorrow.

## 2021-03-30 NOTE — PROGRESS NOTE ADULT - SUBJECTIVE AND OBJECTIVE BOX
Patient is a 56y old  Male who presents with a chief complaint of 1.1 Right thalamic hemorrhage with IVH (30 Mar 2021 11:48)      24 HOUR EVENTS:  No overnight events reported.     SUBJECTIVE:  Patient seen and examined at bedside.   He feels that the left arm weakness is much improved.   He denies having any other complaints.     ALLERGIES:  No Known Allergies    MEDICATIONS  (STANDING):  atorvastatin 80 milliGRAM(s) Oral at bedtime  cloNIDine 0.1 milliGRAM(s) Oral two times a day  dextrose 40% Gel 15 Gram(s) Oral once  dextrose 5%. 1000 milliLiter(s) (50 mL/Hr) IV Continuous <Continuous>  dextrose 5%. 1000 milliLiter(s) (100 mL/Hr) IV Continuous <Continuous>  dextrose 50% Injectable 25 Gram(s) IV Push once  dextrose 50% Injectable 25 Gram(s) IV Push once  dextrose 50% Injectable 12.5 Gram(s) IV Push once  enoxaparin Injectable 40 milliGRAM(s) SubCutaneous <User Schedule>  folic acid 1 milliGRAM(s) Oral daily  gabapentin 300 milliGRAM(s) Oral at bedtime  glucagon  Injectable 1 milliGRAM(s) IntraMuscular once  influenza   Vaccine 0.5 milliLiter(s) IntraMuscular once  insulin lispro (ADMELOG) corrective regimen sliding scale   SubCutaneous two times a day with meals  lactobacillus acidophilus 1 Tablet(s) Oral daily  lidocaine   Patch 1 Patch Transdermal <User Schedule>  lidocaine 4% Topical Solution 1 Application(s) Topical <User Schedule>  losartan 50 milliGRAM(s) Oral daily  melatonin 6 milliGRAM(s) Oral at bedtime  metFORMIN 850 milliGRAM(s) Oral two times a day with meals  multivitamin 1 Tablet(s) Oral daily  nicotine -  14 mG/24Hr(s) Patch 1 patch Transdermal daily  NIFEdipine XL 30 milliGRAM(s) Oral <User Schedule>  polyethylene glycol 3350 17 Gram(s) Oral two times a day  senna 2 Tablet(s) Oral at bedtime  thiamine 100 milliGRAM(s) Oral daily    MEDICATIONS  (PRN):  acetaminophen   Tablet .. 650 milliGRAM(s) Oral every 6 hours PRN Temp greater or equal to 38C (100.4F), Mild Pain (1 - 3)  aluminum hydroxide/magnesium hydroxide/simethicone Suspension 30 milliLiter(s) Oral every 4 hours PRN Dyspepsia  bisacodyl 5 milliGRAM(s) Oral every 12 hours PRN Constipation    Vital Signs Last 24 Hrs  T(F): 98.5 (30 Mar 2021 07:58), Max: 98.5 (30 Mar 2021 07:58)  HR: 91 (30 Mar 2021 07:58) (84 - 102)  BP: 114/81 (30 Mar 2021 07:58) (114/81 - 156/80)  RR: 16 (30 Mar 2021 07:58) (16 - 16)  SpO2: 99% (30 Mar 2021 07:58) (97% - 99%)  I&O's Summary    29 Mar 2021 07:01  -  30 Mar 2021 07:00  --------------------------------------------------------  IN: 0 mL / OUT: 805 mL / NET: -805 mL      PHYSICAL EXAM:  General: NAD, A/O x 3  ENT: Moist mucous membranes, no thrush  Neck: Supple, No JVD  Lungs: Clear to auscultation bilaterally, good air entry, non-labored breathing  Cardio: RRR, S1/S2, No murmur  Abdomen: Soft, Nontender, Nondistended; Bowel sounds present  Extremities: No calf tenderness, No pitting edema    LABS:                        13.7   7.43  )-----------( 228      ( 29 Mar 2021 06:50 )             40.1     03-29    139  |  102  |  17  ----------------------------<  132  4.3   |  27  |  0.80    Ca    9.6      29 Mar 2021 06:50          eGFR if Non African American: 100 mL/min/1.73M2 (03-29-21 @ 06:50)  eGFR if African American: 115 mL/min/1.73M2 (03-29-21 @ 06:50)    03-08 Chol 234 mg/dL LDL -- HDL 54 mg/dL Trig 200 mg/dL    POCT Blood Glucose.: 121 mg/dL (30 Mar 2021 07:52)  POCT Blood Glucose.: 130 mg/dL (29 Mar 2021 21:44)  POCT Blood Glucose.: 106 mg/dL (29 Mar 2021 16:21)    RADIOLOGY & ADDITIONAL TESTS:    Care Discussed with Consultants/Other Providers:   PMR, Dr. Roberts.

## 2021-03-30 NOTE — CHART NOTE - NSCHARTNOTEFT_GEN_A_CORE
Assessment:     Patient speaks Tajik, writer used  phone to communicate with patient (access code: ID# [id number]). Patient denies any food allergies, nausea, vomiting, constipation, or diarrhea.  Last BM reported last night (3-17-21), skin intact, no edema noted. Patient denied any food allergies, nausea, vomiting, diarrhea, or constipation. Patient reports consuming 100% of his meals and Glucerna supplement received BID. Offered to obtain patients food preferences, but patient is satisfied with all food he is currently receiving.    Diet Presciption: Diet, Consistent Carbohydrate w/Evening Snack:   1200mL Fluid Restriction ([id number])  Supplement Feeding Modality:  Oral  Glucerna Shake Cans or Servings Per Day:  1       Frequency:  Two Times a day (03-17-21 @ 06:42)        Current Weight: Weight (kg): 97.6 (03-13 @ 15:36)  % Weight Change    Pertinent Medications: MEDICATIONS  (STANDING):  acetaminophen   Tablet .. 975 milliGRAM(s) Oral every 8 hours  atorvastatin 80 milliGRAM(s) Oral at bedtime  cloNIDine 0.3 milliGRAM(s) Oral three times a day  dextrose 40% Gel 15 Gram(s) Oral once  dextrose 5%. 1000 milliLiter(s) (50 mL/Hr) IV Continuous <Continuous>  dextrose 5%. 1000 milliLiter(s) (100 mL/Hr) IV Continuous <Continuous>  dextrose 50% Injectable 25 Gram(s) IV Push once  dextrose 50% Injectable 12.5 Gram(s) IV Push once  dextrose 50% Injectable 25 Gram(s) IV Push once  enoxaparin Injectable 40 milliGRAM(s) SubCutaneous <User Schedule>  folic acid 1 milliGRAM(s) Oral daily  gabapentin 300 milliGRAM(s) Oral at bedtime  glucagon  Injectable 1 milliGRAM(s) IntraMuscular once  influenza   Vaccine 0.5 milliLiter(s) IntraMuscular once  insulin glargine Injectable (LANTUS) 20 Unit(s) SubCutaneous at bedtime  insulin lispro (ADMELOG) corrective regimen sliding scale   SubCutaneous at bedtime  insulin lispro (ADMELOG) corrective regimen sliding scale   SubCutaneous three times a day before meals  lactobacillus acidophilus 1 Tablet(s) Oral daily  losartan 100 milliGRAM(s) Oral daily  melatonin 6 milliGRAM(s) Oral at bedtime  metFORMIN 1000 milliGRAM(s) Oral two times a day with meals  multivitamin 1 Tablet(s) Oral daily  nicotine -  14 mG/24Hr(s) Patch 1 patch Transdermal daily  NIFEdipine XL 90 milliGRAM(s) Oral daily  sodium chloride 1 Gram(s) Oral two times a day  thiamine 100 milliGRAM(s) Oral daily    MEDICATIONS  (PRN):  aluminum hydroxide/magnesium hydroxide/simethicone Suspension 30 milliLiter(s) Oral every 4 hours PRN Dyspepsia  traMADol 25 milliGRAM(s) Oral every 6 hours PRN Moderate Pain (4 - 6)  traMADol 50 milliGRAM(s) Oral every 6 hours PRN Severe Pain (7 - 10)    Pertinent Labs: 03-18 Na127 mmol/L<L> Glu 133 mg/dL<H> K+ 4.0 mmol/L Cr  0.70 mg/dL BUN 16 mg/dL 03-14 Alb 4.0 g/dL 03-08 Chol 234 mg/dL<H> LDL --    HDL 54 mg/dL Trig 200 mg/dL<H>     CAPILLARY BLOOD GLUCOSE      POCT Blood Glucose.: 131 mg/dL (18 Mar 2021 11:49)  POCT Blood Glucose.: 157 mg/dL (18 Mar 2021 07:37)  POCT Blood Glucose.: 146 mg/dL (17 Mar 2021 22:07)  POCT Blood Glucose.: 147 mg/dL (17 Mar 2021 17:10)      Estimated Needs:   [X] no change since previous assessment    Previous Nutrition Diagnosis:   [x] Malnutrition - Moderate     Nutrition Diagnosis is [x] ongoing      New Nutrition Diagnosis: [ x ] not applicable       Interventions:   1) Continue current diet order and Glucerna supplement BID 2) Obtain daily weights 3) Obtain PO intakes for daily meals    Monitoring and Evaluation:   [ x ] PO intake [ x ] Tolerance to diet prescription [ x ] weights [ x ] labs[ x ] follow up per protocol  [ ] other:
NUTRITION FOLLOW UP    SOURCE: Patient [X)   Family [ ]    Medical Record (X)    DIET:  Diet, Consistent Carbohydrate w/Evening Snack:   Supplement Feeding Modality:  Oral  Glucerna Shake Cans or Servings Per Day:  1       Frequency:  Two Times a day (03-27-21 @ 14:21) [Active]    PATIENT REPORT:  [X] other: no GI distress    PO INTAKE:  [X]  %     Spoke w/ pt today through  phone- ID#: [id number].   Observed pt this morning after breakfast. Pt consumed 100% of meal and supplement. PO intake approx. >75% as per RN documentation. Pt reports good appetite and has been eating all his meals and supplements. Pt reports that he doesn't always feel full after his meals. Pt states that he would like salt packets to give his meals more flavor. Reports no issues chewing/swallowing. Denies N/V/D/C. Preferences updated in diet office. Will continue to monitor/follow as indicated.     CURRENT WEIGHT: 214# (3/28)    PERTINENT MEDS:   Pertinent Medications: MEDICATIONS  (STANDING):  atorvastatin 80 milliGRAM(s) Oral at bedtime  cloNIDine 0.1 milliGRAM(s) Oral two times a day  dextrose 40% Gel 15 Gram(s) Oral once  dextrose 5%. 1000 milliLiter(s) (50 mL/Hr) IV Continuous <Continuous>  dextrose 5%. 1000 milliLiter(s) (100 mL/Hr) IV Continuous <Continuous>  dextrose 50% Injectable 25 Gram(s) IV Push once  dextrose 50% Injectable 25 Gram(s) IV Push once  dextrose 50% Injectable 12.5 Gram(s) IV Push once  enoxaparin Injectable 40 milliGRAM(s) SubCutaneous <User Schedule>  folic acid 1 milliGRAM(s) Oral daily  gabapentin 300 milliGRAM(s) Oral at bedtime  glucagon  Injectable 1 milliGRAM(s) IntraMuscular once  influenza   Vaccine 0.5 milliLiter(s) IntraMuscular once  insulin lispro (ADMELOG) corrective regimen sliding scale   SubCutaneous two times a day with meals  lactobacillus acidophilus 1 Tablet(s) Oral daily  lidocaine   Patch 1 Patch Transdermal <User Schedule>  lidocaine 4% Topical Solution 1 Application(s) Topical <User Schedule>  losartan 50 milliGRAM(s) Oral daily  melatonin 6 milliGRAM(s) Oral at bedtime  metFORMIN 850 milliGRAM(s) Oral two times a day with meals  multivitamin 1 Tablet(s) Oral daily  nicotine -  14 mG/24Hr(s) Patch 1 patch Transdermal daily  NIFEdipine XL 30 milliGRAM(s) Oral <User Schedule>  polyethylene glycol 3350 17 Gram(s) Oral two times a day  senna 2 Tablet(s) Oral at bedtime  thiamine 100 milliGRAM(s) Oral daily    MEDICATIONS  (PRN):  acetaminophen   Tablet .. 650 milliGRAM(s) Oral every 6 hours PRN Temp greater or equal to 38C (100.4F), Mild Pain (1 - 3)  aluminum hydroxide/magnesium hydroxide/simethicone Suspension 30 milliLiter(s) Oral every 4 hours PRN Dyspepsia  bisacodyl 5 milliGRAM(s) Oral every 12 hours PRN Constipation    PERTINENT LABS:  03-29 Na139 mmol/L Glu 132 mg/dL<H> K+ 4.3 mmol/L Cr  0.80 mg/dL BUN 17 mg/dL 03-08 Chol 234 mg/dL<H> LDL --    HDL 54 mg/dL Trig 200 mg/dL<H>      SKIN:  WNL  EDEMA: none noted   LAST BM: 3/30    ESTIMATED NEEDS:   [X] no change since previous assessment  [ ] recalculated:     PREVIOUS NUTRITION DIAGNOSIS: moderate malnutrition    NUTRITION DIAGNOSIS is :  (X)  Ongoing      (    ) Resolved,  RD will follow as per nutrition department protocol.    NUTRITION RECOMMENDATIONS:   1. Continue current diet as ordered. Honor pt's preferences as feasible w/ diet order.   1. Continue Glucerna nutritional supplement BID.     MONITORING AND EVALUATION:   1. Tolerance to diet prescription   2. PO intake  3. Weights  4. Labs  5. Follow Up per protocol     RD to remain available.  MICHELA Perez Post Dietetic Intern
NUTRITION FOLLOW UP    SOURCE: Patient [X)   Family [ ]    Medical Record (X)    DIET: Consistent Carbohydrate, evening snack, Fluid Restriction 1200 ml, Glucerna BID     PATIENT REPORT  [X ] other: no GI distress    PO INTAKE:   [ X]   %         CURRENT WEIGHT: 215.1 (3/13)    Observed pt this AM during breakfast. Visually observed PO intake of meal and Glucerna supplement, consumed 100%. Chart shows pt has consistently been consuming >75% from 3/22-2/24.  Pt reports good appetite. Denies N/V/D, occasional constipation. Pt is on bowel regimen senna and dulcolax. No chewing/swallowing difficulties. Elevated blood glucose lab values noted. Pt is unreceptive to diabetes nutrition education, possibly due to language barrier or lacking motivation. Will provide diabetes dietary handout to encourage education.     PERTINENT MEDS:   Pertinent Medications: MEDICATIONS  (STANDING):  atorvastatin 80 milliGRAM(s) Oral at bedtime  cloNIDine 0.1 milliGRAM(s) Oral two times a day  dextrose 40% Gel 15 Gram(s) Oral once  dextrose 5%. 1000 milliLiter(s) (50 mL/Hr) IV Continuous <Continuous>  dextrose 5%. 1000 milliLiter(s) (100 mL/Hr) IV Continuous <Continuous>  dextrose 50% Injectable 25 Gram(s) IV Push once  dextrose 50% Injectable 25 Gram(s) IV Push once  dextrose 50% Injectable 12.5 Gram(s) IV Push once  enoxaparin Injectable 40 milliGRAM(s) SubCutaneous <User Schedule>  folic acid 1 milliGRAM(s) Oral daily  gabapentin 300 milliGRAM(s) Oral at bedtime  glucagon  Injectable 1 milliGRAM(s) IntraMuscular once  influenza   Vaccine 0.5 milliLiter(s) IntraMuscular once  insulin lispro (ADMELOG) corrective regimen sliding scale   SubCutaneous at bedtime  insulin lispro (ADMELOG) corrective regimen sliding scale   SubCutaneous three times a day before meals  lactobacillus acidophilus 1 Tablet(s) Oral daily  lidocaine   Patch 1 Patch Transdermal <User Schedule>  lidocaine 4% Topical Solution 1 Application(s) Topical <User Schedule>  losartan 50 milliGRAM(s) Oral daily  melatonin 6 milliGRAM(s) Oral at bedtime  metFORMIN 1000 milliGRAM(s) Oral two times a day  multivitamin 1 Tablet(s) Oral daily  nicotine -  14 mG/24Hr(s) Patch 1 patch Transdermal daily  polyethylene glycol 3350 17 Gram(s) Oral two times a day  senna 2 Tablet(s) Oral at bedtime  sodium chloride 1 Gram(s) Oral daily  thiamine 100 milliGRAM(s) Oral daily    MEDICATIONS  (PRN):  aluminum hydroxide/magnesium hydroxide/simethicone Suspension 30 milliLiter(s) Oral every 4 hours PRN Dyspepsia  bisacodyl 5 milliGRAM(s) Oral every 12 hours PRN Constipation  traMADol 25 milliGRAM(s) Oral every 6 hours PRN Moderate Pain (4 - 6)  traMADol 50 milliGRAM(s) Oral every 6 hours PRN Severe Pain (7 - 10)      PERTINENT LABS:  03-23 Na137 mmol/L Glu 105 mg/dL<H> K+ 5.0 mmol/L Cr  0.87 mg/dL BUN 16 mg/dL 03-08 Chol 234 mg/dL<H> LDL --    HDL 54 mg/dL Trig 200 mg/dL<H>        SKIN:  intact  EDEMA: none  LAST BM: 3/24    ESTIMATED NEEDS:   [X] no change since previous assessment      PREVIOUS NUTRITION DIAGNOSIS:  moderate malnutrition    NUTRITION DIAGNOSIS is :  ( X  )  Ongoing         NUTRITION RECOMMENDATIONS:   1. Continue to provide Glucerna shake BID  2. Encourage adequate oral intake   3. Reinforce diabetes nutrition education    MONITORING AND EVALUATION:   1. Tolerance to diet prescription   2. PO intake  3. Weights  4. Labs  5. Follow Up per protocol     RD to remain available     MICHELA Munson POST Dietetic Intern
REHABILITATION DIAGNOSIS/IMPAIRMENT GROUP CODE:  Thalamic hemorrhage     COMORBIDITIES/COMPLICATING CONDITIONS IMPACTING REHABILITATION:  HEALTH ISSUES - PROBLEM Dx:  Low back pain   Headache       PAST MEDICAL & SURGICAL HISTORY:  CAD (coronary artery disease), SP Stent 2019  HTN (hypertension)  DM (diabetes mellitus)  HLD (hyperlipidemia)        Based upon consideration of the patient's impairments, functional status, complicating conditions and any other contributing factors and after information garnered from the assessments of all therapy disciplines involved in treating the patient and other pertinent clinicians:    INTERDISCIPLINARY REHABILITATION INTERVENTIONS:    [ x  ] Transfer Training  [  x ] Bed Mobility  [  x ] Therapeutic Exercise  [  x ] Balance/Coordination Exercises  [  x ] Locomotion retraining  [  x ] Stairs  [x   ] Functional Transfer Training  [x   ] Bowel/Bladder program  [  x ] Pain Management  [ x  ] Skin/Wound Care  [   ] Visual/Perceptual Training  [   ] Therapeutic Recreation Activities  [ x  ] Neuromuscular Re-education  [ x  ] Activities of Daily Living  [x   ] Speech Exercise  [   ] Swallowing Exercises  [   ] Vital Stim  [   ] Dietary Supplements  [   ] Calorie Count  [   ] Cognitive Exercises  [   ] Cognitive/Linguistic Treatment  [   ] Behavior Program  [   ] Neuropsych Therapy  [ x  ] Patient/Family Counseling  [ x  ] Family Training  [   ] Community Re-entry  [   ] Orthotic Evaluation  [   ] Prosthetic Eval/Training    MEDICAL PROGNOSIS:  fair    REHAB POTENTIAL:  fair    EXPECTED DAILY THERAPY:         PT:1 hr/day        OT:1 hr/day        ST:1 hr/day        P&O:NA    EXPECTED INTENSITY OF PROGRAM:  3 hrs/day     EXPECTED FREQUENCY OF PROGRAM:  5 days/week     ESTIMATED LOS:  10-14 days    ESTIMATED DISPOSITION:  home    INTERDISCIPLINARY FUNCTIONAL OUTCOMES/GOALS:         Gait/Mobility:modified independent       Transfers:modified independent       ADLs:modified independent       Functional Transfers:modified independent       Medication Management:modified independent       Communication:modified independent       Cognitive:modified independent       Dysphagia:modified independent       Bladder:modified independent       Bowel:modified independent

## 2021-03-30 NOTE — DISCHARGE NOTE NURSING/CASE MANAGEMENT/SOCIAL WORK - NSDCVIVACCINE_GEN_ALL_CORE_FT
Severe acute respiratory syndrome coronavirus 2 (SARS-CoV-2) (Coronavirus disease [COVID-19]) vaccine , 2021/3/12 14:27 , [place] (RYLIE)

## 2021-03-30 NOTE — DISCHARGE NOTE NURSING/CASE MANAGEMENT/SOCIAL WORK - NSDCCRNUMBER_GEN_ALL_CORE_FT
[phone], [phone], [address] [facility] [address] [facility], [place] [address]

## 2021-03-30 NOTE — DISCHARGE NOTE NURSING/CASE MANAGEMENT/SOCIAL WORK - NSDCCRTYPESERV_GEN_ALL_CORE_FT
Friday 4/2/21 - 9am - Occupational Therapy appointment, Friday 4/2/21 - 10am Physical Therapy appointment, Friday 4/2/21 - 11am Speech Therapy appointment

## 2021-03-30 NOTE — PROGRESS NOTE ADULT - ASSESSMENT
PENNIE GIANG is a 56M with PMHx of HTN (non-compliant with medications) who presented to [facility] on 03/07/2021 with HA and imbalance s/p fall on 3/6, admission SBP >220, found to have right thalamic hemorrhage with intraventricular extension. Admitted for multidisciplinary rehab program.      #Comprehensive Multidisciplinary Rehab Program:- PT/OT/ SLP 3 hours a day 5 days a week    #Right thalamic hemorrhage with intraventricular extension:- likely 2/2 uncontrolled HTN  - s/p cerebral angiography-->no evidence of AVM; last CT on 3/9 was stable  - per neuro, MRI in 4-6 weeks to r/o vascular malformations  - BP control, as below    #LBP:- Improved   - c/w Gabapentin to 300mg qhs  - tramadol prn   - Lidocaine patch daily with Lidocaine gel for low back at night    #Hyponatremia: improved; Na now WNL - off salt tabs and fluid restriction  - most likely SIADH -    HTN:- c/w Clonidine 0.1mg bid,( dose reduced 3/24)  Nifedipine  XL 30mg daily, and Losartan 50mg daily,    HLD:- c/w Atorvastatin 80mg qhs    Smoking history:- c/w Nicotine patch daily    #Sleep:- Melatonin PRN    #GI/Bowel:on senna and miralax. Add dulcolax . MOM today if no BM . Increase miralax to bid     #/Bladder:- Toileting schedule q4h    #Diet :- Diet: DASH/TLC and carb consistent  - Nutrition to follow    #Skin/ Pressure Injury Prevention: Turn Q2hrs in bed while awake, OOB to Chair, PT/OT    #DVT prophylaxis:  - Lovenox 40mg daily  - SCDs    Fever:-at admission, - s/p J&J vaccine prior to admission; ID w/u neg    Dispo: Team conference 3/30  - OT: - Munir/supervision for shower transfers and bathing  - PT: CG transfers, 150' RW CG, 12 steps CG  - SLP: doing well, making improvements  - TDD: 3/31 PENNIE GIANG is a 56M with PMHx of HTN (non-compliant with medications) who presented to [facility] on 03/07/2021 with HA and imbalance s/p fall on 3/6, admission SBP >220, found to have right thalamic hemorrhage with intraventricular extension. Admitted for multidisciplinary rehab program.      #Comprehensive Multidisciplinary Rehab Program:- PT/OT/ SLP 3 hours a day 5 days a week    #Right thalamic hemorrhage with intraventricular extension:- likely 2/2 uncontrolled HTN  - s/p cerebral angiography-->no evidence of AVM; last CT on 3/9 was stable  - per neuro, MRI in 4-6 weeks to r/o vascular malformations  - BP control, as below    #LBP:- Improved   - c/w Gabapentin to 300mg qhs  - tramadol prn   - Lidocaine patch daily with Lidocaine gel for low back at night    #Hyponatremia: improved; Na now WNL - off salt tabs and fluid restriction  - most likely SIADH -    HTN:- c/w Clonidine 0.1mg bid,( dose reduced 3/24)  Nifedipine  XL 30mg daily, and Losartan 50mg daily,    HLD:- c/w Atorvastatin 80mg qhs    Smoking history:- c/w Nicotine patch daily    #Sleep:- Melatonin PRN    #GI/Bowel:on senna and miralax. Add dulcolax . MOM today if no BM . Increase miralax to bid     #/Bladder:- Toileting schedule q4h    #Diet :- Diet: DASH/TLC and carb consistent  - Nutrition to follow    #Skin/ Pressure Injury Prevention: Turn Q2hrs in bed while awake, OOB to Chair, PT/OT    #DVT prophylaxis:  - Lovenox 40mg daily  - SCDs    Fever:-at admission, - s/p J&J vaccine prior to admission; ID w/u neg    Dispo: Team conference 3/30  - OT: - Munir/supervision for shower transfers and bathing  - PT: CG transfers, 150' RW CG, 12 steps CG  - SLP: doing well, making improvements  - TDD: 3/31  - will update wife this afternoon and send medications to pharmacy PENNIE GIANG is a 56M with PMHx of HTN (non-compliant with medications) who presented to [facility] on 03/07/2021 with HA and imbalance s/p fall on 3/6, admission SBP >220, found to have right thalamic hemorrhage with intraventricular extension. Admitted for multidisciplinary rehab program.      #Comprehensive Multidisciplinary Rehab Program:- PT/OT/ SLP 3 hours a day 5 days a week    #Right thalamic hemorrhage with intraventricular extension:- likely 2/2 uncontrolled HTN  - s/p cerebral angiography-->no evidence of AVM; last CT on 3/9 was stable  - per neuro, MRI in 4-6 weeks to r/o vascular malformations  - BP control, as below    #LBP:- Improved   - c/w Gabapentin to 300mg qhs  - tramadol prn   - Lidocaine patch daily with Lidocaine gel for low back at night    #Hyponatremia: improved; Na now WNL - off salt tabs and fluid restriction  - most likely SIADH -    HTN:- c/w Clonidine 0.1mg bid,( dose reduced 3/24)  Nifedipine  XL 30mg daily, and Losartan 50mg daily,    HLD:- c/w Atorvastatin 80mg qhs    Smoking history:- c/w Nicotine patch daily; plan to decrease from 14mg to 7mg. DC with 2 week supply and then, discontinue    #Sleep:- Melatonin PRN    #GI/Bowel:on senna and miralax. Add dulcolax . MOM today if no BM . Increase miralax to bid     #/Bladder:- Toileting schedule q4h    #Diet :- Diet: DASH/TLC and carb consistent  - Nutrition to follow    #Skin/ Pressure Injury Prevention: Turn Q2hrs in bed while awake, OOB to Chair, PT/OT    #DVT prophylaxis:  - Lovenox 40mg daily  - SCDs    Fever:-at admission, - s/p J&J vaccine prior to admission; ID w/u neg    Dispo: Team conference 3/30  - OT: - Munir/supervision for shower transfers and bathing  - PT: CG transfers, 150' RW CG, 12 steps CG  - SLP: doing well, making improvements  - TDD: 3/31  - will update wife this afternoon and send medications to pharmacy

## 2021-03-31 VITALS
HEART RATE: 97 BPM | OXYGEN SATURATION: 96 % | DIASTOLIC BLOOD PRESSURE: 80 MMHG | RESPIRATION RATE: 15 BRPM | SYSTOLIC BLOOD PRESSURE: 108 MMHG | TEMPERATURE: 98 F

## 2021-03-31 LAB — GLUCOSE BLDC GLUCOMTR-MCNC: 107 MG/DL — HIGH (ref 70–99)

## 2021-03-31 PROCEDURE — 87040 BLOOD CULTURE FOR BACTERIA: CPT

## 2021-03-31 PROCEDURE — 83935 ASSAY OF URINE OSMOLALITY: CPT

## 2021-03-31 PROCEDURE — 97167 OT EVAL HIGH COMPLEX 60 MIN: CPT

## 2021-03-31 PROCEDURE — 82962 GLUCOSE BLOOD TEST: CPT

## 2021-03-31 PROCEDURE — 83930 ASSAY OF BLOOD OSMOLALITY: CPT

## 2021-03-31 PROCEDURE — 93005 ELECTROCARDIOGRAM TRACING: CPT

## 2021-03-31 PROCEDURE — 36415 COLL VENOUS BLD VENIPUNCTURE: CPT

## 2021-03-31 PROCEDURE — 84300 ASSAY OF URINE SODIUM: CPT

## 2021-03-31 PROCEDURE — 85025 COMPLETE CBC W/AUTO DIFF WBC: CPT

## 2021-03-31 PROCEDURE — 83605 ASSAY OF LACTIC ACID: CPT

## 2021-03-31 PROCEDURE — 83735 ASSAY OF MAGNESIUM: CPT

## 2021-03-31 PROCEDURE — 93970 EXTREMITY STUDY: CPT

## 2021-03-31 PROCEDURE — 85027 COMPLETE CBC AUTOMATED: CPT

## 2021-03-31 PROCEDURE — 86803 HEPATITIS C AB TEST: CPT

## 2021-03-31 PROCEDURE — 80053 COMPREHEN METABOLIC PANEL: CPT

## 2021-03-31 PROCEDURE — 92610 EVALUATE SWALLOWING FUNCTION: CPT

## 2021-03-31 PROCEDURE — 92523 SPEECH SOUND LANG COMPREHEN: CPT

## 2021-03-31 PROCEDURE — 85379 FIBRIN DEGRADATION QUANT: CPT

## 2021-03-31 PROCEDURE — 87635 SARS-COV-2 COVID-19 AMP PRB: CPT

## 2021-03-31 PROCEDURE — 99232 SBSQ HOSP IP/OBS MODERATE 35: CPT

## 2021-03-31 PROCEDURE — 97116 GAIT TRAINING THERAPY: CPT

## 2021-03-31 PROCEDURE — 80048 BASIC METABOLIC PNL TOTAL CA: CPT

## 2021-03-31 PROCEDURE — U0005: CPT

## 2021-03-31 PROCEDURE — 99238 HOSP IP/OBS DSCHRG MGMT 30/<: CPT

## 2021-03-31 PROCEDURE — 92507 TX SP LANG VOICE COMM INDIV: CPT

## 2021-03-31 PROCEDURE — 97163 PT EVAL HIGH COMPLEX 45 MIN: CPT

## 2021-03-31 PROCEDURE — 71045 X-RAY EXAM CHEST 1 VIEW: CPT

## 2021-03-31 PROCEDURE — 71046 X-RAY EXAM CHEST 2 VIEWS: CPT

## 2021-03-31 PROCEDURE — U0003: CPT

## 2021-03-31 PROCEDURE — 97110 THERAPEUTIC EXERCISES: CPT

## 2021-03-31 PROCEDURE — 97535 SELF CARE MNGMENT TRAINING: CPT

## 2021-03-31 PROCEDURE — 81001 URINALYSIS AUTO W/SCOPE: CPT

## 2021-03-31 PROCEDURE — 97530 THERAPEUTIC ACTIVITIES: CPT

## 2021-03-31 RX ORDER — GABAPENTIN 400 MG/1
1 CAPSULE ORAL
Qty: 30 | Refills: 0
Start: 2021-03-31 | End: 2021-04-29

## 2021-03-31 RX ORDER — NICOTINE POLACRILEX 2 MG
1 GUM BUCCAL
Qty: 14 | Refills: 0
Start: 2021-03-31 | End: 2021-04-13

## 2021-03-31 RX ORDER — ATORVASTATIN CALCIUM 80 MG/1
1 TABLET, FILM COATED ORAL
Qty: 30 | Refills: 0
Start: 2021-03-31 | End: 2021-04-29

## 2021-03-31 RX ORDER — METFORMIN HYDROCHLORIDE 850 MG/1
1 TABLET ORAL
Qty: 60 | Refills: 0
Start: 2021-03-31 | End: 2021-04-29

## 2021-03-31 RX ORDER — NIFEDIPINE 30 MG
1 TABLET, EXTENDED RELEASE 24 HR ORAL
Qty: 30 | Refills: 0
Start: 2021-03-31 | End: 2021-04-29

## 2021-03-31 RX ORDER — LOSARTAN POTASSIUM 100 MG/1
1 TABLET, FILM COATED ORAL
Qty: 30 | Refills: 0
Start: 2021-03-31 | End: 2021-04-29

## 2021-03-31 RX ADMIN — Medication 0.1 MILLIGRAM(S): at 05:11

## 2021-03-31 RX ADMIN — Medication 1 PATCH: at 07:25

## 2021-03-31 RX ADMIN — METFORMIN HYDROCHLORIDE 850 MILLIGRAM(S): 850 TABLET ORAL at 08:07

## 2021-03-31 RX ADMIN — LOSARTAN POTASSIUM 50 MILLIGRAM(S): 100 TABLET, FILM COATED ORAL at 05:11

## 2021-03-31 NOTE — PROGRESS NOTE ADULT - SUBJECTIVE AND OBJECTIVE BOX
DAILY PROGRESS NOTE:  HPI:  PENNIE GIANG is a 56M with PMHx of HTN (non-compliant with medications) who presented to [facility] on 2021 with HA and imbalance s/p fall on 3/6. SBP elevated on admisison >220, thus started on nicardipine gtt; CT head findings c/w right thalamic hemorrhage with intraventricular hemorrhage. Cerebral angiography on 3/8 did not show evidence of AVM. Nicardipine weaned off and started on Clonidine, HCTZ, Nifedipine, and Losartan. Repeat CT head on 3/9 with stable findings. Plan for MRI brain in 4-6 weeks to rule out underlying vascular malformation.    Patient was evaluated by PM&R and therapy for functional deficits and gait/ ADL impairments and recommended acute rehabilitation. Patient was medically optimized for discharge to [facility] on 2021.    On admission, patient noted to have temp 99.6F oral, 101F rectal. Patient denies feeling febrile, chills, SOB, dysuria, abd pain. COVID-19 PCR, UA and CXR ordered.  (13 Mar 2021 12:23)      Subjective:  Patient was seen and examined at the bedside this AM. No acute overnight events.     Vital Signs Last 24 Hrs  T(C): 36.9 (31 Mar 2021 07:36), Max: 37.2 (30 Mar 2021 20:47)  T(F): 98.4 (31 Mar 2021 07:36), Max: 98.9 (30 Mar 2021 20:47)  HR: 97 (31 Mar 2021 07:36) (84 - 99)  BP: 108/80 (31 Mar 2021 07:36) (108/80 - 140/94)  BP(mean): --  RR: 15 (31 Mar 2021 07:36) (15 - 16)  SpO2: 96% (31 Mar 2021 07:36) (96% - 97%)          Gen - NAD, Comfortable  Pulm - CTAB,  Cardiovascular - S1S2  Abdomen - Soft, NT/ND, +BS  Extremities - no edema or calf tenderness  Neuro-     awake and alert     Fluent, No dysarthria     Cranial Nerves - no facial asymmetry     Motor - 5/5   Psychiatric - Mood stable, Affect WNL      Recent Labs/Imagin.7   7.43  )-----------( 228      ( 29 Mar 2021 06:50 )             40.1   MEDICATIONS  (STANDING):  atorvastatin 80 milliGRAM(s) Oral at bedtime  cloNIDine 0.1 milliGRAM(s) Oral two times a day  dextrose 40% Gel 15 Gram(s) Oral once  dextrose 5%. 1000 milliLiter(s) (50 mL/Hr) IV Continuous <Continuous>  dextrose 5%. 1000 milliLiter(s) (100 mL/Hr) IV Continuous <Continuous>  dextrose 50% Injectable 25 Gram(s) IV Push once  dextrose 50% Injectable 12.5 Gram(s) IV Push once  dextrose 50% Injectable 25 Gram(s) IV Push once  enoxaparin Injectable 40 milliGRAM(s) SubCutaneous <User Schedule>  folic acid 1 milliGRAM(s) Oral daily  gabapentin 300 milliGRAM(s) Oral at bedtime  glucagon  Injectable 1 milliGRAM(s) IntraMuscular once  insulin lispro (ADMELOG) corrective regimen sliding scale   SubCutaneous two times a day with meals  lactobacillus acidophilus 1 Tablet(s) Oral daily  losartan 50 milliGRAM(s) Oral daily  melatonin 6 milliGRAM(s) Oral at bedtime  metFORMIN 850 milliGRAM(s) Oral two times a day with meals  multivitamin 1 Tablet(s) Oral daily  nicotine -   7 mG/24Hr(s) Patch 1 patch Transdermal daily  NIFEdipine XL 30 milliGRAM(s) Oral <User Schedule>  thiamine 100 milliGRAM(s) Oral daily    MEDICATIONS  (PRN):  acetaminophen   Tablet .. 650 milliGRAM(s) Oral every 6 hours PRN Temp greater or equal to 38C (100.4F), Mild Pain (1 - 3)  aluminum hydroxide/magnesium hydroxide/simethicone Suspension 30 milliLiter(s) Oral every 4 hours PRN Dyspepsia        139  |  102  |  17  ----------------------------<  132<H>  4.3   |  27  |  0.80    Ca    9.6      29 Mar 2021 06:50

## 2021-03-31 NOTE — PROGRESS NOTE ADULT - SUBJECTIVE AND OBJECTIVE BOX
Patient is a 56y old  Male who presents with a chief complaint of 1.1 Right thalamic hemorrhage with IVH (31 Mar 2021 11:57)      24 HOUR EVENTS:  No overnight events reported.     SUBJECTIVE:  Patient seen and examined at bedside.   No acute complaints. Looking fwd to going home today.    ALLERGIES:  No Known Allergies    MEDICATIONS  (STANDING):  atorvastatin 80 milliGRAM(s) Oral at bedtime  cloNIDine 0.1 milliGRAM(s) Oral two times a day  dextrose 40% Gel 15 Gram(s) Oral once  dextrose 5%. 1000 milliLiter(s) (50 mL/Hr) IV Continuous <Continuous>  dextrose 5%. 1000 milliLiter(s) (100 mL/Hr) IV Continuous <Continuous>  dextrose 50% Injectable 25 Gram(s) IV Push once  dextrose 50% Injectable 25 Gram(s) IV Push once  dextrose 50% Injectable 12.5 Gram(s) IV Push once  enoxaparin Injectable 40 milliGRAM(s) SubCutaneous <User Schedule>  folic acid 1 milliGRAM(s) Oral daily  gabapentin 300 milliGRAM(s) Oral at bedtime  glucagon  Injectable 1 milliGRAM(s) IntraMuscular once  insulin lispro (ADMELOG) corrective regimen sliding scale   SubCutaneous two times a day with meals  lactobacillus acidophilus 1 Tablet(s) Oral daily  losartan 50 milliGRAM(s) Oral daily  melatonin 6 milliGRAM(s) Oral at bedtime  metFORMIN 850 milliGRAM(s) Oral two times a day with meals  multivitamin 1 Tablet(s) Oral daily  nicotine -   7 mG/24Hr(s) Patch 1 patch Transdermal daily  NIFEdipine XL 30 milliGRAM(s) Oral <User Schedule>  thiamine 100 milliGRAM(s) Oral daily    MEDICATIONS  (PRN):  acetaminophen   Tablet .. 650 milliGRAM(s) Oral every 6 hours PRN Temp greater or equal to 38C (100.4F), Mild Pain (1 - 3)  aluminum hydroxide/magnesium hydroxide/simethicone Suspension 30 milliLiter(s) Oral every 4 hours PRN Dyspepsia    Vital Signs Last 24 Hrs  T(F): 98.4 (31 Mar 2021 07:36), Max: 98.9 (30 Mar 2021 20:47)  HR: 97 (31 Mar 2021 07:36) (84 - 99)  BP: 108/80 (31 Mar 2021 07:36) (108/80 - 140/94)  RR: 15 (31 Mar 2021 07:36) (15 - 16)  SpO2: 96% (31 Mar 2021 07:36) (96% - 97%)  I&O's Summary    PHYSICAL EXAM:  General: NAD, A/O x 3  ENT: Moist mucous membranes, no thrush  Neck: Supple, No JVD  Lungs: Clear to auscultation bilaterally, good air entry, non-labored breathing  Cardio: RRR, S1/S2, No murmur  Abdomen: Soft, Nontender, Nondistended; Bowel sounds present  Extremities: No calf tenderness, No pitting edema    LABS:                        13.7   7.43  )-----------( 228      ( 29 Mar 2021 06:50 )             40.1     03-29    139  |  102  |  17  ----------------------------<  132  4.3   |  27  |  0.80    Ca    9.6      29 Mar 2021 06:50          eGFR if Non African American: 100 mL/min/1.73M2 (03-29-21 @ 06:50)  eGFR if African American: 115 mL/min/1.73M2 (03-29-21 @ 06:50)              03-08 Chol 234 mg/dL LDL -- HDL 54 mg/dL Trig 200 mg/dL              POCT Blood Glucose.: 107 mg/dL (31 Mar 2021 07:24)  POCT Blood Glucose.: 124 mg/dL (30 Mar 2021 21:48)  POCT Blood Glucose.: 104 mg/dL (30 Mar 2021 16:41)          RADIOLOGY & ADDITIONAL TESTS:    Care Discussed with Consultants/Other Providers:

## 2021-03-31 NOTE — PROGRESS NOTE ADULT - ASSESSMENT
PENNIE GIANG is a 56M with PMHx of HTN (non-compliant with medications) who presented to [facility] on 03/07/2021 with HA and imbalance s/p fall on 3/6, admission SBP >220, found to have right thalamic hemorrhage with intraventricular extension. Admitted for multidisciplinary rehab program.      #Right thalamic hemorrhage with intraventricular extension:- likely 2/2 uncontrolled HTN  - s/p cerebral angiography-->no evidence of AVM; last CT on 3/9 was stable  - per neuro, MRI in 4-6 weeks to r/o vascular malformations  - BP control, as below    #LBP:- Improved   - c/w Gabapentin to 300mg qhs  -   #Hyponatremia: improved; Na now WNL - off salt tabs and fluid restriction  - most likely SIADH -    HTN:- c/w Clonidine 0.1mg bid,( dose reduced 3/24)  Nifedipine  XL 30mg daily, and Losartan 50mg daily,    HLD:- c/w Atorvastatin 80mg qhs    Smoking history:- c/w Nicotine patch daily; - reduced to 7mg /day for 2 weeks     #Sleep:- Melatonin PRN    #GI/Bowel:meds at home as needed     #/Bladder:- Toileting prn     #Diet :- Diet: DASH/TLC and carb consistent  - Nutrition to follow    #Skin/ Pressure Injury Prevention: Turn Q2hrs in bed while awake, OOB to Chair, PT/OT    #DVT prophylaxis:  - Lovenox 40mg daily      Disp:  home today  Medications reviewed  FU discussed - PCP, Neurology, PMR  Scripts given for outpatient therapy   stable for discharge

## 2021-03-31 NOTE — PROGRESS NOTE ADULT - NUTRITIONAL ASSESSMENT
This patient has been assessed with a concern for Malnutrition and has been determined to have a diagnosis/diagnoses of Moderate protein-calorie malnutrition.    This patient is being managed with:   Diet Consistent Carbohydrate w/Evening Snack-  1200mL Fluid Restriction ([id number])  Supplement Feeding Modality:  Oral  Glucerna Shake Cans or Servings Per Day:  1       Frequency:  Two Times a day  Entered: Mar 17 2021  6:43AM    
This patient has been assessed with a concern for Malnutrition and has been determined to have a diagnosis/diagnoses of Moderate protein-calorie malnutrition.    This patient is being managed with:   Diet Consistent Carbohydrate w/Evening Snack-  Low Sodium  Supplement Feeding Modality:  Oral  Glucerna Shake Cans or Servings Per Day:  1       Frequency:  Two Times a day  Entered: Mar 15 2021  1:28PM    
This patient has been assessed with a concern for Malnutrition and has been determined to have a diagnosis/diagnoses of Moderate protein-calorie malnutrition.    This patient is being managed with:   Diet Consistent Carbohydrate w/Evening Snack-  1500mL Fluid Restriction ([id number])  Supplement Feeding Modality:  Oral  Glucerna Shake Cans or Servings Per Day:  1       Frequency:  Two Times a day  Entered: Mar 25 2021  7:13PM    
This patient has been assessed with a concern for Malnutrition and has been determined to have a diagnosis/diagnoses of Moderate protein-calorie malnutrition.    This patient is being managed with:   Diet Consistent Carbohydrate w/Evening Snack-  Supplement Feeding Modality:  Oral  Glucerna Shake Cans or Servings Per Day:  1       Frequency:  Two Times a day  Entered: Mar 27 2021  2:22PM    
This patient has been assessed with a concern for Malnutrition and has been determined to have a diagnosis/diagnoses of Moderate protein-calorie malnutrition.    This patient is being managed with:   Diet Consistent Carbohydrate w/Evening Snack-  1200mL Fluid Restriction ([id number])  Supplement Feeding Modality:  Oral  Glucerna Shake Cans or Servings Per Day:  1       Frequency:  Two Times a day  Entered: Mar 17 2021  6:43AM    
This patient has been assessed with a concern for Malnutrition and has been determined to have a diagnosis/diagnoses of Moderate protein-calorie malnutrition.    This patient is being managed with:   Diet Consistent Carbohydrate w/Evening Snack-  1200mL Fluid Restriction ([id number])  Supplement Feeding Modality:  Oral  Glucerna Shake Cans or Servings Per Day:  1       Frequency:  Two Times a day  Entered: Mar 17 2021  6:43AM    
This patient has been assessed with a concern for Malnutrition and has been determined to have a diagnosis/diagnoses of Moderate protein-calorie malnutrition.    This patient is being managed with:   Diet Consistent Carbohydrate w/Evening Snack-  1200mL Fluid Restriction ([id number])  Supplement Feeding Modality:  Oral  Glucerna Shake Cans or Servings Per Day:  1       Frequency:  Two Times a day  Entered: Mar 17 2021  6:43AM    
This patient has been assessed with a concern for Malnutrition and has been determined to have a diagnosis/diagnoses of Moderate protein-calorie malnutrition.    This patient is being managed with:   Diet Consistent Carbohydrate w/Evening Snack-  1200mL Fluid Restriction ([id number])  Supplement Feeding Modality:  Oral  Glucerna Shake Cans or Servings Per Day:  1       Frequency:  Two Times a day  Entered: Mar 17 2021  6:43AM    
This patient has been assessed with a concern for Malnutrition and has been determined to have a diagnosis/diagnoses of Moderate protein-calorie malnutrition.    This patient is being managed with:   Diet Consistent Carbohydrate w/Evening Snack-  1200mL Fluid Restriction ([id number])  Supplement Feeding Modality:  Oral  Glucerna Shake Cans or Servings Per Day:  1       Frequency:  Two Times a day  Entered: Mar 17 2021  6:43AM    
This patient has been assessed with a concern for Malnutrition and has been determined to have a diagnosis/diagnoses of Moderate protein-calorie malnutrition.    This patient is being managed with:   Diet Consistent Carbohydrate w/Evening Snack-  Supplement Feeding Modality:  Oral  Glucerna Shake Cans or Servings Per Day:  1       Frequency:  Two Times a day  Entered: Mar 27 2021  2:22PM    
This patient has been assessed with a concern for Malnutrition and has been determined to have a diagnosis/diagnoses of Moderate protein-calorie malnutrition.    This patient is being managed with:   Diet Consistent Carbohydrate w/Evening Snack-  1200mL Fluid Restriction ([id number])  Supplement Feeding Modality:  Oral  Glucerna Shake Cans or Servings Per Day:  1       Frequency:  Two Times a day  Entered: Mar 17 2021  6:43AM    
This patient has been assessed with a concern for Malnutrition and has been determined to have a diagnosis/diagnoses of Moderate protein-calorie malnutrition.    This patient is being managed with:   Diet Consistent Carbohydrate w/Evening Snack-  1200mL Fluid Restriction ([id number])  Supplement Feeding Modality:  Oral  Glucerna Shake Cans or Servings Per Day:  1       Frequency:  Two Times a day  Entered: Mar 17 2021  6:43AM    
This patient has been assessed with a concern for Malnutrition and has been determined to have a diagnosis/diagnoses of Moderate protein-calorie malnutrition.    This patient is being managed with:   Diet Consistent Carbohydrate w/Evening Snack-  1200mL Fluid Restriction ([id number])  Supplement Feeding Modality:  Oral  Glucerna Shake Cans or Servings Per Day:  1       Frequency:  Two Times a day  Entered: Mar 17 2021  6:43AM    
This patient has been assessed with a concern for Malnutrition and has been determined to have a diagnosis/diagnoses of Moderate protein-calorie malnutrition.    This patient is being managed with:   Diet Consistent Carbohydrate w/Evening Snack-  1200mL Fluid Restriction ([id number])  Supplement Feeding Modality:  Oral  Glucerna Shake Cans or Servings Per Day:  1       Frequency:  Two Times a day  Entered: Mar 17 2021  6:43AM    
This patient has been assessed with a concern for Malnutrition and has been determined to have a diagnosis/diagnoses of Moderate protein-calorie malnutrition.    This patient is being managed with:   Diet Consistent Carbohydrate w/Evening Snack-  1200mL Fluid Restriction ([id number])  Supplement Feeding Modality:  Oral  Glucerna Shake Cans or Servings Per Day:  1       Frequency:  Two Times a day  Entered: Mar 17 2021  6:43AM    
This patient has been assessed with a concern for Malnutrition and has been determined to have a diagnosis/diagnoses of Moderate protein-calorie malnutrition.    This patient is being managed with:   Diet Consistent Carbohydrate w/Evening Snack-  1500mL Fluid Restriction ([id number])  Supplement Feeding Modality:  Oral  Glucerna Shake Cans or Servings Per Day:  1       Frequency:  Two Times a day  Entered: Mar 25 2021  7:13PM    
This patient has been assessed with a concern for Malnutrition and has been determined to have a diagnosis/diagnoses of Moderate protein-calorie malnutrition.    This patient is being managed with:   Diet Consistent Carbohydrate w/Evening Snack-  Supplement Feeding Modality:  Oral  Glucerna Shake Cans or Servings Per Day:  1       Frequency:  Two Times a day  Entered: Mar 27 2021  2:22PM    
This patient has been assessed with a concern for Malnutrition and has been determined to have a diagnosis/diagnoses of Moderate protein-calorie malnutrition.    This patient is being managed with:   Diet Consistent Carbohydrate w/Evening Snack-  Supplement Feeding Modality:  Oral  Glucerna Shake Cans or Servings Per Day:  1       Frequency:  Two Times a day  Entered: Mar 27 2021  2:22PM

## 2021-03-31 NOTE — PROGRESS NOTE ADULT - PROVIDER SPECIALTY LIST ADULT
Hospitalist
Hospitalist
Physiatry
Rehab Medicine
Hospitalist
Physiatry
Rehab Medicine
Rehab Medicine
Hospitalist
Internal Medicine
Physiatry
Rehab Medicine
Rehab Medicine
Hospitalist
Hospitalist
Physiatry
Hospitalist
Hospitalist

## 2021-03-31 NOTE — PROGRESS NOTE ADULT - REASON FOR ADMISSION
1.1 Right thalamic hemorrhage with IVH
1.1 Right thalamic hemorrhage with IVH
Right thalamic hemorrhage with IVH
1.1 Right thalamic hemorrhage with IVH
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
Right thalamic hemorrhage with IVH
TBI closed injury Right thalamic hemorrhage with IVH
TBI closed injury Right thalamic hemorrhage with IVH
1.1 Right thalamic hemorrhage with IVH
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
Right thalamic hemorrhage with IVH
1.1 Right thalamic hemorrhage with IVH
Right thalamic hemorrhage with IVH
TBI closed injury Right thalamic hemorrhage with IVH
1.1 Right thalamic hemorrhage with IVH

## 2021-03-31 NOTE — PROGRESS NOTE ADULT - ASSESSMENT
56M with PMHx of HTN (non-compliant with medications) who presented to [facility] on 03/07/2021 with HA and imbalance s/p fall on 3/6, admission SBP >220, found to have right thalamic hemorrhage with intraventricular extension.  Admitted for multidisciplinary rehab program- pt/ot/dvt ppx    No new recommendations.     #Right thalamic hemorrhage with intraventricular extension  - likely 2/2 uncontrolled HTN, per chart patient non complaint with meds.  - s/p cerebral angiography-->no evidence of AVM; last CT on 3/9 was stable  - per neuro, MRI in 4-6 weeks to r/o vascular malformations  - atorvastatin 80 mg     #DM2, uncontrolled  - hgbA1C 8.7% (03.08.21)   - metformin, lispro    #HTN  - aggressive BP control  - continue with losartan  - also on clonidine and nifedipine.  -  hctz reportedly d/c'ed due to hyponatremia    #LBP  - CT lumbar spine negative for acute pathology    #Smoking history  - c/w Nicotine patch, wean off as able  - continue to encourage cessation    #insomnia  - Melatonin PRN    # HLD  - statin as above    # hyponatremia likely SIADH- resolved  # Hypokalemia - resolved    DVT ppx: lovenox  Dispo: anticipated discharge home today.

## 2021-05-05 PROBLEM — Z00.00 ENCOUNTER FOR PREVENTIVE HEALTH EXAMINATION: Status: ACTIVE | Noted: 2021-05-05

## 2022-03-07 NOTE — ED PROVIDER NOTE - TOBACCO USE
Addended by: Wilma Garcia on: 3/7/2022 04:33 PM     Modules accepted: Orders Current every day smoker

## 2022-04-12 NOTE — PROGRESS NOTE ADULT - SUBJECTIVE AND OBJECTIVE BOX
04/12/22 1301   Reason for Consult   Reason for Consult Initial assessment    CCLS worked with family previously; CCLS continued building rapport with mother and pt in pre-op   Developmental and Communication Consideration Documented delays    Adaptive Care Plan in placed and followed by CCLS throughout   Diagnosis/Procedure Procedural/surgery   Assisting During Procedure Surgery   Patient Intervention(s)   Type of Intervention Performed Normalizing and coping;Procedural support   Normalizing and Coping Intervention(s) Activities to promote developmental play   Procedural Support Intervention(s) Comfort positioning;Distraction;Positive touch;Verbal reassurance   Support Provided to Family   Support Provided to Family Parent/caregiver(s)  (Mother in pre-op with pt)   Parent/Caregiver(s) Intervention Active listening   Anxiety Level   Anxiety Level Patient displays appropriate distress/anxiety;Patient displays acute distress/anxiety   As Evidenced by: pt unconsolable during anesthesia mask induction   Evaluation   Patient Behaviors Pre-Interventions Playful;Quiet  (CCLS built rapport through play with fidget toys and spinners while in pre-op)   Patient Behaviors During Interventions Upset;Uncooperative  (Pt initially cooperative in OR but then tried to push away mask and staff during induction)   Patient Behaviors Post-Intervention(s) Sedated   Evaluation/Plan of Care Patient/family receptive   Jayda Browne, Certified Child Life Specialist     DAILY PROGRESS NOTE:  HPI:  PENNIE GIANG is a 56M with PMHx of HTN (non-compliant with medications) who presented to [facility] on 03/07/2021 with HA and imbalance s/p fall on 3/6. SBP elevated on admisison >220, thus started on nicardipine gtt; CT head findings c/w right thalamic hemorrhage with intraventricular hemorrhage. Cerebral angiography on 3/8 did not show evidence of AVM. Nicardipine weaned off and started on Clonidine, HCTZ, Nifedipine, and Losartan. Repeat CT head on 3/9 with stable findings. Plan for MRI brain in 4-6 weeks to rule out underlying vascular malformation.    Patient was evaluated by PM&R and therapy for functional deficits and gait/ ADL impairments and recommended acute rehabilitation. Patient was medically optimized for discharge to [facility] on 03/12/2021.    On admission, patient noted to have temp 99.6F oral, 101F rectal. Patient denies feeling febrile, chills, SOB, dysuria, abd pain. COVID-19 PCR, UA and CXR ordered.  (13 Mar 2021 12:23)      Subjective:  Patient was seen and examined at the bedside this AM; [employer] ID [id number] provided Yoruba translations. No acute overnight events. Endorsed LBP radiating to b/l LEs; endorsed numbness in b/l buttocks and posterior thigh. States symptoms are not new, but forgot symptoms were present when asked previously. Encouraged to take Tramadol 30 minutes to 1 hour prior to therapy sessions. Some abdominal discomfort in the AM when taking all his medications. Reported no other complaints.     ROS:  (+) LBP radiating to b/l LEs with associated numbness; (+) abdominal discomfort this AM. Denied fever, chills, nausea, vomiting, CP, palpitations, coughing, wheezing, or SOB. Last BM was on 3/18      Physical Exam:  Vital Signs Last 24 Hrs  T(C): 36.7 (19 Mar 2021 08:33), Max: 36.9 (18 Mar 2021 21:19)  T(F): 98.1 (19 Mar 2021 08:33), Max: 98.4 (18 Mar 2021 21:19)  HR: 76 (19 Mar 2021 08:33) (72 - 84)  BP: 100/70 (19 Mar 2021 08:33) (100/70 - 138/88)  RR: 15 (19 Mar 2021 08:33) (15 - 15)  SpO2: 99% (19 Mar 2021 08:33) (95% - 99%)      Gen - NAD, Comfortable  Pulm - CTAB, No wheeze, No rhonchi  Cardiovascular - S1S2  Abdomen - Soft, NT/ND, +BS  Extremities - no edema or calf tenderness  Neuro-     Cognitive - awake and alert     Communication - Fluent, No dysarthria     Cranial Nerves - no facial asymmetry     Motor -                     LEFT    UE - ShAB 5/5, EF 5/5, EE 4/5, WE 4/5,  5/5                    RIGHT UE - ShAB 5/5, EF 5/5, EE 5/5, WE 5/5,  5/5                    LEFT    LE - HF 4/5, KE 5/5, DF 5/5, PF 5/5                    RIGHT LE - HF 5/5, KE 5/5, DF 5/5, PF 5/5     Psychiatric - Mood stable, Affect WNL      Recent Labs/Imaging:                        15.3   8.12  )-----------( 262      ( 18 Mar 2021 05:30 )             43.4     03-18    127<L>  |  93<L>  |  16  ----------------------------<  133<H>  4.0   |  24  |  0.70    Ca    9.3      18 Mar 2021 05:30    POCT Blood Glucose.: 169 mg/dL (03-19-21 @ 07:53)  POCT Blood Glucose.: 174 mg/dL (03-18-21 @ 21:14)  POCT Blood Glucose.: 132 mg/dL (03-18-21 @ 16:57)  POCT Blood Glucose.: 131 mg/dL (03-18-21 @ 11:49)      Medications:  acetaminophen   Tablet .. 975 milliGRAM(s) Oral every 8 hours  aluminum hydroxide/magnesium hydroxide/simethicone Suspension 30 milliLiter(s) Oral every 4 hours PRN  atorvastatin 80 milliGRAM(s) Oral at bedtime  cloNIDine 0.3 milliGRAM(s) Oral three times a day  dextrose 40% Gel 15 Gram(s) Oral once  dextrose 5%. 1000 milliLiter(s) IV Continuous <Continuous>  dextrose 5%. 1000 milliLiter(s) IV Continuous <Continuous>  dextrose 50% Injectable 25 Gram(s) IV Push once  dextrose 50% Injectable 12.5 Gram(s) IV Push once  dextrose 50% Injectable 25 Gram(s) IV Push once  enoxaparin Injectable 40 milliGRAM(s) SubCutaneous <User Schedule>  folic acid 1 milliGRAM(s) Oral daily  gabapentin 300 milliGRAM(s) Oral at bedtime  glucagon  Injectable 1 milliGRAM(s) IntraMuscular once  influenza   Vaccine 0.5 milliLiter(s) IntraMuscular once  insulin glargine Injectable (LANTUS) 20 Unit(s) SubCutaneous at bedtime  insulin lispro (ADMELOG) corrective regimen sliding scale   SubCutaneous at bedtime  insulin lispro (ADMELOG) corrective regimen sliding scale   SubCutaneous three times a day before meals  lactobacillus acidophilus 1 Tablet(s) Oral daily  lidocaine   Patch 1 Patch Transdermal <User Schedule>  losartan 100 milliGRAM(s) Oral daily  melatonin 6 milliGRAM(s) Oral at bedtime  metFORMIN 500 milliGRAM(s) Oral two times a day  multivitamin 1 Tablet(s) Oral daily  nicotine -  14 mG/24Hr(s) Patch 1 patch Transdermal daily  NIFEdipine XL 90 milliGRAM(s) Oral daily  sodium chloride 1 Gram(s) Oral two times a day  thiamine 100 milliGRAM(s) Oral daily  traMADol 25 milliGRAM(s) Oral every 6 hours PRN  traMADol 50 milliGRAM(s) Oral every 6 hours PRN

## 2022-07-05 NOTE — H&P ADULT - RS GEN PE MLT RESP DETAILS PC
07/05/22 11:56 AM        Thank you for your request  Your request has been received, reviewed, and the patient chart updated  The PCP has successfully been removed with a patient attribution note  This message will now be completed          Thank you  Bonnie Martinez clear to auscultation bilaterally/no rhonchi/no rales/no wheezes

## 2023-01-24 NOTE — ED ADULT NURSE NOTE - NSHOSCREENINGQ1_ED_ALL_ED
Speech Therapy      Visit Type: Treatment  -  Communication/cognition    Precautions     - Medical precautions: ; standard precautions.     SUBJECTIVE  Patient seen at bedside; seated upright in chair. Alert and cooperative. Patient agreed to participate in therapy this date.   Patient/family goals: return home   Pain at onset of session:     -  5/10     - Location: headache; RN aware    OBJECTIVE         Communication/Cognition  Problem Solving:      - Deductive reasoning: intact (>90% accuracy) (completed deduction puzzle with 100% accuracy independently)    - Executive functioning:         Managing medications: Completed pill box assessment for errors with 100% accuracy independently. Reviewed medications' purpose/dosage/frequency. Provided written handout for patient to review.            ASSESSMENT  Impairments: memory, attention/concentration and problem solving/executive function  Functional Limitations: communication/cognition, medication management, finance management and IADLs  Pt progressing towards goals. Cont per plan of care.    Requires SLP Follow Up: Yes    Discharge Recommendations  SLP Identified Barriers to Discharge: Higher level cognition  Recommendations for Discharge: SLP IL: Patient requires intermittent assistance secondary to cognitive/communication impairments      Progress: Improving as expected       • Rehab Potential: excellent     • Potential barriers to progress:  Processing time     • Skilled therapy is required to address these limitations in attempt to maximize the patient's independence.    Therapy Participation: This patient participated in all scheduled speech therapy time this session.    Education:   - Present and ready to learn: patient  Education provided during session:  - cognition  - Results of above outlined education: Verbalizes understanding    Patient at end of session:    - location: in chair    - safety measures: call light within reach (patient mod indep in room  per PT)    - hand off to: nurse    PLAN  Frequency: 5-6x/week; 30-60 mins/day; PN due Monday  Duration: TBD  Interventions:  Communication/cognition therapy    Plan/Goal Agreement:  Patient agrees with goals and individualized plan of care      RECOMMENDATIONS     -Communication Cognition:          *Patient continues to demonstrate acute communication and cognition deficits, will continue to follow.  Patient will require periodic supervision at discharge and assist with instrumental activities of daily living.        GOALS  Review date: 1/25/2023  Short Term Goals: to be met 7 days from date established, unless otherwise stated.  Patient will complete 3-5 unit working memory/ mental flexibility tasks with 80% accuracy given min cues.  Patient will complete mod-complex executive functioning tasks with 80% accuracy given min cues  Patient will complete mod- complex attention tasks with 80% accuracy given min cues.  Patient will recall a 5 part message following a 10 minute delay with independent use of memory strategies  Ongoing assessment of med mgmt and financial mgmt     Long Term Goals: to be met by discharge from rehab program.  Patient will improve to a modified independent level for cognitive-communication skills.             Therapy procedure time and total treatment time can be found documented on the Time Entry flowsheet   No

## 2023-02-22 NOTE — DIETITIAN INITIAL EVALUATION ADULT. - OBTAIN WEEKLY WEIGHT
Caller:   Reason for Reschedule/Cancellation (please be detailed, any staff messages or encounters to note?): NEEDED LATER DATE      Prior to reschedule please review:    Ordering Provider:DEBORAH WALTER    Sedation per order:CS    Does patient have any ASC Exclusions, please identify?:       Notes on Cancelled Procedure:    Procedure:Lower Endoscopy [Colonoscopy]     Date: 4/4    Location:[facility]; [address], [place] [address]    Surgeon: WAN        Rescheduled: Yes    Procedure: Lower Endoscopy [Colonoscopy]     Date: 4/13    Location:[facility]; [address], [place] [address]    Surgeon: ARI    Sedation Level Scheduled  CS,  Reason for Sedation Level ORDER    Prep/Instructions updated and sent: NO                    
yes

## 2024-03-06 ENCOUNTER — RX ONLY (RX ONLY)
Age: 59
End: 2024-03-06

## 2024-03-20 ENCOUNTER — OFFICE (OUTPATIENT)
Age: 59
Setting detail: OPHTHALMOLOGY
End: 2024-03-20
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
## 2024-03-20 PROCEDURE — NO SHOW FE NO SHOW FEE

## 2024-09-01 NOTE — PROGRESS NOTE ADULT - ASSESSMENT
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, likely 2/2 HTN    NEURO:  Stroke core measures  CT head for stability  Activity: [x] mobilize as tolerated [] Bedrest for tonight [x] PT [x] OT [] PMNR    PULM:  Encourage incentive spirometry     CV:  SBP goal 100-160  Wean off nicardipine gtt  HTN: losartan  TTE    RENAL:  Fluids: IVF    GI:  Diet: Diet as tolerated  Bowel regimen     ENDO:   Goal euglycemia (-180)  Insulin basal/bolus pre-meal    HEME/ONC:  VTE prophylaxis: [x] SCDs [] chemoprophylaxis: start in AM if CT Head stable as no vascular malformation     ID: monitor for fevers    SOCIAL/FAMILY:  [x] awaiting [] updated at bedside [] family meeting    CODE STATUS:  [x] Full Code [] DNR [] DNI [] Palliative/Comfort Care    DISPOSITION:  [x] ICU [] Stroke Unit [] Floor [] EMU [] RCU [] PCU    [x] Patient is at high risk of neurologic deterioration/death due to: intracranial hemorrhage expansion, intraventricular hemorrhage, hydrocephalus, brain compression     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, likely 2/2 HTN    NEURO:  Stroke core measures  CT head for stability  Activity: [x] mobilize as tolerated [] Bedrest for tonight [x] PT [x] OT [] PMNR    PULM:  Encourage incentive spirometry     CV:  SBP goal 100-160  Wean off nicardipine gtt   HTN: losartan, add labetalol  TTE    RENAL:  Fluids: IVF    GI:  Diet: Diet as tolerated  Bowel regimen     ENDO:   Goal euglycemia (-180)  Insulin basal/bolus pre-meal    HEME/ONC:  VTE prophylaxis: [x] SCDs [] chemoprophylaxis: start in AM if CT Head stable as no vascular malformation     ID: monitor for fevers    SOCIAL/FAMILY:  [x] awaiting [] updated at bedside [] family meeting    CODE STATUS:  [x] Full Code [] DNR [] DNI [] Palliative/Comfort Care    DISPOSITION:  [x] ICU [] Stroke Unit [] Floor [] EMU [] RCU [] PCU    [x] Patient is at high risk of neurologic deterioration/death due to: intracranial hemorrhage expansion, intraventricular hemorrhage, hydrocephalus, brain compression     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, likely 2/2 HTN    NEURO:  Stroke core measures  CT head for stability  Activity: [x] mobilize as tolerated [] Bedrest for tonight [x] PT [x] OT [] PMNR    PULM:  Encourage incentive spirometry     CV:  SBP goal 100-160mmHg   Wean off nicardipine gtt   HTN: losartan, add labetalol  TTE    RENAL:  Fluids: IVF    GI:  Diet: Diet as tolerated  Bowel regimen     ENDO:   Goal euglycemia (-180)  Insulin basal/bolus pre-meal    HEME/ONC:  VTE prophylaxis: [x] SCDs [] chemoprophylaxis: start in AM if CT Head stable as no vascular malformation     ID: monitor for fevers    SOCIAL/FAMILY:  [x] awaiting [] updated at bedside [] family meeting    CODE STATUS:  [x] Full Code [] DNR [] DNI [] Palliative/Comfort Care    DISPOSITION:  [x] ICU [] Stroke Unit [] Floor [] EMU [] RCU [] PCU    [x] Patient is at high risk of neurologic deterioration/death due to: intracranial hemorrhage expansion, intraventricular hemorrhage, hydrocephalus, brain compression     Principal Discharge DX:	Kidney stone   1

## 2024-10-04 NOTE — OCCUPATIONAL THERAPY INITIAL EVALUATION ADULT - LEVEL OF INDEPENDENCE, REHAB EVAL
Discussed pathology report with the patient and the medical provider at the [facility].  We will order breast MRI ultrasound and breast talk with Dr. Kim.  ER/[place] and [facility] pending.    This has been electronically signed by Rafael Samuels PA-C.   
NT-as per RN patient needs to remain flat

## 2024-10-09 NOTE — PROGRESS NOTE ADULT - SUBJECTIVE AND OBJECTIVE BOX
Called and got patient scheduled.   Subjective: Patient seen and examined. No new events except as noted.   remains in Stroke unit   Off cardene gtt   REVIEW OF SYSTEMS:    CONSTITUTIONAL: + weakness, fevers or chills  EYES/ENT: No visual changes;  No vertigo or throat pain   NECK: No pain or stiffness  RESPIRATORY: No cough, wheezing, hemoptysis; No shortness of breath  CARDIOVASCULAR: No chest pain or palpitations  GASTROINTESTINAL: No abdominal or epigastric pain. No nausea, vomiting, or hematemesis; No diarrhea or constipation. No melena or hematochezia.  GENITOURINARY: No dysuria, frequency or hematuria  NEUROLOGICAL: No numbness or weakness  SKIN: No itching, burning, rashes, or lesions   All other review of systems is negative unless indicated above.    MEDICATIONS:  MEDICATIONS  (STANDING):  atorvastatin 80 milliGRAM(s) Oral at bedtime  dextrose 40% Gel 15 Gram(s) Oral once  dextrose 50% Injectable 25 Gram(s) IV Push once  dextrose 50% Injectable 12.5 Gram(s) IV Push once  dextrose 50% Injectable 25 Gram(s) IV Push once  enoxaparin Injectable 40 milliGRAM(s) SubCutaneous <User Schedule>  folic acid 1 milliGRAM(s) Oral daily  glucagon  Injectable 1 milliGRAM(s) IntraMuscular once  hydrochlorothiazide 25 milliGRAM(s) Oral daily  influenza   Vaccine 0.5 milliLiter(s) IntraMuscular once  insulin glargine Injectable (LANTUS) 8 Unit(s) SubCutaneous at bedtime  insulin lispro (ADMELOG) corrective regimen sliding scale   SubCutaneous Before meals and at bedtime  insulin lispro Injectable (ADMELOG) 5 Unit(s) SubCutaneous three times a day before meals  losartan 100 milliGRAM(s) Oral daily  multivitamin 1 Tablet(s) Oral daily  nicotine -  14 mG/24Hr(s) Patch 1 patch Transdermal daily  NIFEdipine XL 90 milliGRAM(s) Oral daily  polyethylene glycol 3350 17 Gram(s) Oral two times a day  senna 2 Tablet(s) Oral at bedtime  thiamine 100 milliGRAM(s) Oral daily      PHYSICAL EXAM:  T(C): 36.9 (03-10-21 @ 08:36), Max: 37.2 (03-09-21 @ 20:00)  HR: 86 (03-10-21 @ 08:00) (71 - 110)  BP: 154/97 (03-10-21 @ 09:13) (124/77 - 155/98)  RR: 19 (03-10-21 @ 08:00) (10 - 22)  SpO2: 97% (03-10-21 @ 08:00) (87% - 100%)  Wt(kg): --  I&O's Summary    09 Mar 2021 07:01  -  10 Mar 2021 07:00  --------------------------------------------------------  IN: 760 mL / OUT: 1600 mL / NET: -840 mL        Appearance: NAD  HEENT:   Dry oral mucosa, PERRL, EOMI	  Lymphatic: No lymphadenopathy  Cardiovascular: Normal S1 S2, No JVD, No murmurs, No edema  Respiratory: Lungs clear to auscultation	  Psychiatry: A & O x 3, Mood & affect appropriate  Gastrointestinal:  Soft, Non-tender, + BS	  Skin: No rashes, No ecchymoses, No cyanosis	  NEURO EXAM:  Alert, oriented, speech fluent, no dysarthria, follows commands, EOMI, no facial asymmetry, mild LUE drift, otherwise 5/5 strength throughout, sensation symmetric to light touch, no dysmetria  Extremities: Normal range of motion, No clubbing, cyanosis or edema  Vascular: Peripheral pulses palpable 2+ bilaterally      LABS:    CARDIAC MARKERS:  CARDIAC MARKERS ( 07 Mar 2021 21:16 )  x     / x     / 78 U/L / x     / 1.2 ng/mL                                15.5   8.60  )-----------( 194      ( 10 Mar 2021 05:37 )             45.1     03-10    134<L>  |  97  |  17  ----------------------------<  202<H>  3.4<L>   |  23  |  0.53    Ca    10.1      10 Mar 2021 05:37  Phos  2.7     03-08  Mg     2.1     03-08      proBNP:   Lipid Profile:   HgA1c:   TSH:             TELEMETRY: 	  SR   ECG:  	  RADIOLOGY:   DIAGNOSTIC TESTING:  [ ] Echocardiogram:  [ ]  Catheterization:  [ ] Stress Test:    OTHER:

## 2024-12-02 NOTE — H&P ADULT - WEIGHT IN KG
Quality 226: Preventive Care And Screening: Tobacco Use: Screening And Cessation Intervention: Patient screened for tobacco use and is an ex/non-smoker Detail Level: Detailed 104.3

## 2025-04-14 NOTE — ED ADULT NURSE NOTE - SUICIDE SCREENING QUESTION 1
What Is The Reason For Today's Visit?: Full Body Skin Examination with No Concerns
What Is The Reason For Today's Visit? (Being Monitored For X): concerning skin lesions on an annual basis
What Type Of Note Output Would You Prefer (Optional)?: Standard Output
No